# Patient Record
Sex: FEMALE | Race: WHITE
[De-identification: names, ages, dates, MRNs, and addresses within clinical notes are randomized per-mention and may not be internally consistent; named-entity substitution may affect disease eponyms.]

---

## 2018-11-02 ENCOUNTER — TRANSCRIPTION ENCOUNTER (OUTPATIENT)
Age: 28
End: 2018-11-02

## 2018-11-11 ENCOUNTER — TRANSCRIPTION ENCOUNTER (OUTPATIENT)
Age: 28
End: 2018-11-11

## 2019-02-14 ENCOUNTER — TRANSCRIPTION ENCOUNTER (OUTPATIENT)
Age: 29
End: 2019-02-14

## 2019-02-20 ENCOUNTER — APPOINTMENT (OUTPATIENT)
Dept: DERMATOLOGY | Facility: CLINIC | Age: 29
End: 2019-02-20
Payer: MEDICAID

## 2019-02-20 VITALS — BODY MASS INDEX: 26.31 KG/M2 | HEIGHT: 60 IN | WEIGHT: 134 LBS

## 2019-02-20 DIAGNOSIS — Z80.8 FAMILY HISTORY OF MALIGNANT NEOPLASM OF OTHER ORGANS OR SYSTEMS: ICD-10-CM

## 2019-02-20 DIAGNOSIS — B36.0 PITYRIASIS VERSICOLOR: ICD-10-CM

## 2019-02-20 DIAGNOSIS — Z00.00 ENCOUNTER FOR GENERAL ADULT MEDICAL EXAMINATION W/OUT ABNORMAL FINDINGS: ICD-10-CM

## 2019-02-20 DIAGNOSIS — D22.9 MELANOCYTIC NEVI, UNSPECIFIED: ICD-10-CM

## 2019-02-20 DIAGNOSIS — Z80.9 FAMILY HISTORY OF MALIGNANT NEOPLASM, UNSPECIFIED: ICD-10-CM

## 2019-02-20 PROCEDURE — 99203 OFFICE O/P NEW LOW 30 MIN: CPT

## 2019-02-20 RX ORDER — NORETHINDRONE ACETATE AND ETHINYL ESTRADIOL AND FERROUS FUMARATE 1MG-20(24)
KIT ORAL
Refills: 0 | Status: ACTIVE | COMMUNITY

## 2019-02-20 NOTE — HISTORY OF PRESENT ILLNESS
[FreeTextEntry1] : Rash - trunk. [de-identified] : Notes present for 6-7 months,with progression.  No response to solitary tx with diflucan.  mild itching.  No self tx.

## 2019-02-20 NOTE — ASSESSMENT
[FreeTextEntry1] : A complete skin examination was performed.  There is no evidence of skin cancer.  We discussed the importance of photoprotection, including the use of hats, protective clothing and sunscreens with an SPF of at least 30.  Sun avoidance was also discussed.\par \par TV\par Nizoral foam bid for 1 month.\par head and shoulders intensive tx shampoo - weekly total body washes.

## 2019-02-20 NOTE — PHYSICAL EXAM
[Alert] : alert [Oriented x 3] : ~L oriented x 3 [Well Nourished] : well nourished [Full Body Skin Exam Performed] : performed [Eyelids] : Eyelids [Ears] : Ears [Lips] : Lips [Neck] : Neck [FreeTextEntry3] : A full skin exam was performed including the scalp, face, neck, chest, abdomen, back, buttocks, upper extremities and lower extremities.  The patient declined examination of the breasts and genitalia.  \par The exam did not reveal any evidence of skin cancer, showing only the following benign growths:\par Indiahoma pigmented nevi - all small, 2-3 mm, and bland.  trunk.\par \par Erythematous scaling continental patches on the anterior/posterior trunk.\par

## 2019-03-25 ENCOUNTER — NON-APPOINTMENT (OUTPATIENT)
Age: 29
End: 2019-03-25

## 2019-03-25 ENCOUNTER — APPOINTMENT (OUTPATIENT)
Dept: CARDIOLOGY | Facility: CLINIC | Age: 29
End: 2019-03-25
Payer: MEDICAID

## 2019-03-25 VITALS
WEIGHT: 137 LBS | TEMPERATURE: 98.8 F | HEART RATE: 89 BPM | DIASTOLIC BLOOD PRESSURE: 78 MMHG | BODY MASS INDEX: 25.21 KG/M2 | HEIGHT: 62 IN | OXYGEN SATURATION: 95 % | SYSTOLIC BLOOD PRESSURE: 123 MMHG

## 2019-03-25 DIAGNOSIS — R94.01 ABNORMAL ELECTROENCEPHALOGRAM [EEG]: ICD-10-CM

## 2019-03-25 DIAGNOSIS — R00.0 TACHYCARDIA, UNSPECIFIED: ICD-10-CM

## 2019-03-25 DIAGNOSIS — R93.0 ABNORMAL FINDINGS ON DIAGNOSTIC IMAGING OF SKULL AND HEAD, NOT ELSEWHERE CLASSIFIED: ICD-10-CM

## 2019-03-25 DIAGNOSIS — R55 SYNCOPE AND COLLAPSE: ICD-10-CM

## 2019-03-25 PROCEDURE — 36415 COLL VENOUS BLD VENIPUNCTURE: CPT

## 2019-03-25 PROCEDURE — 99205 OFFICE O/P NEW HI 60 MIN: CPT

## 2019-03-25 PROCEDURE — 93000 ELECTROCARDIOGRAM COMPLETE: CPT

## 2019-03-25 NOTE — PHYSICAL EXAM
[General Appearance - Well Developed] : well developed [Normal Appearance] : normal appearance [Well Groomed] : well groomed [General Appearance - Well Nourished] : well nourished [No Deformities] : no deformities [General Appearance - In No Acute Distress] : no acute distress [Normal Conjunctiva] : the conjunctiva exhibited no abnormalities [Eyelids - No Xanthelasma] : the eyelids demonstrated no xanthelasmas [Normal Oral Mucosa] : normal oral mucosa [No Oral Pallor] : no oral pallor [No Oral Cyanosis] : no oral cyanosis [Normal Jugular Venous A Waves Present] : normal jugular venous A waves present [Normal Jugular Venous V Waves Present] : normal jugular venous V waves present [No Jugular Venous Chambers A Waves] : no jugular venous chambers A waves [Heart Rate And Rhythm] : heart rate and rhythm were normal [Heart Sounds] : normal S1 and S2 [Murmurs] : no murmurs present [Respiration, Rhythm And Depth] : normal respiratory rhythm and effort [Exaggerated Use Of Accessory Muscles For Inspiration] : no accessory muscle use [Auscultation Breath Sounds / Voice Sounds] : lungs were clear to auscultation bilaterally [Abdomen Soft] : soft [Abdomen Tenderness] : non-tender [Abdomen Mass (___ Cm)] : no abdominal mass palpated [Abnormal Walk] : normal gait [Gait - Sufficient For Exercise Testing] : the gait was sufficient for exercise testing [Nail Clubbing] : no clubbing of the fingernails [Cyanosis, Localized] : no localized cyanosis [Petechial Hemorrhages (___cm)] : no petechial hemorrhages [Skin Color & Pigmentation] : normal skin color and pigmentation [] : no rash [No Venous Stasis] : no venous stasis [Skin Lesions] : no skin lesions [No Skin Ulcers] : no skin ulcer [No Xanthoma] : no  xanthoma was observed [Oriented To Time, Place, And Person] : oriented to person, place, and time [Affect] : the affect was normal [Mood] : the mood was normal [FreeTextEntry1] : Somewhat anxious, but not overly

## 2019-03-25 NOTE — REVIEW OF SYSTEMS
[Negative] : Heme/Lymph [Fever] : no fever [Headache] : no headache [Recent Weight Gain (___ Lbs)] : recent [unfilled] ~Ulb weight gain [Chills] : no chills [Feeling Fatigued] : not feeling fatigued [Blurry Vision] : no blurred vision [Eyeglasses] : not currently wearing eyeglasses [Earache] : no earache [Mouth Sores] : no mouth sores [Dyspnea on exertion] : not dyspnea during exertion [Chest  Pressure] : no chest pressure [Lower Ext Edema] : no extremity edema [Palpitations] : no palpitations [Cough] : no cough [Skin: A Rash] : rash: [see HPI] : see HPI [Dizziness] : dizziness [Convulsions] : convulsions [Tingling (Paresthesia)] : tingling [Depression] : no depression [Anxiety] : anxiety [Suicidal] : not suicidal

## 2019-03-25 NOTE — HISTORY OF PRESENT ILLNESS
[FreeTextEntry1] : July, 2013. The patient had a positive tilt test. After about 20 minutes her heart rate went up to 150 and her blood pressure dropped. Her blood pressure then sol. Dr. Alvarez spoke to the patient on the phone on Friday. I advised her to eat more salt and drink more liquids. This morning the patient got out of bed and walk to the kitchen. On the way to the kitchen she blacked out. She still gets a heart rate of 150 with minimal activity.\par \par March 25, 2019. First return visit since above. I believe Dr. Alvarez tried pindolol at one point but unclear if she took it or if it helped. She actually has been doing fairly well with vasovagal episodes mostly just during venipuncture and her panic attacks and anxiety have been under good control. She also claims she has sleep apnea, with 13-17 episodes of apnea per night. She was told it was because she had multiple nasal fractures, most from horseback riding, but she sleeps with her bed elevated and stays on her side and does not seem to have a problem. She has a neurological issues as mentioned and is followed by Dr. Dwight Rosenstein and had seen Dr. Merrill Bowen of neurosurgery (see enclosed notes in [employer]). For some reason over the last, month she's had about 4 episodes of what she calls fainting spells. Sounds like similar to her tilt table response, and they've come on pretty much at random. Once at [employer], once while walking to a restaurant, et cetera. She has enough warning to sit down in the car or on the side of a curb and think she passes out just briefly. She is exhausted afterwards and can feel fatigued for up to 2 hours. (I did do venipuncture myself, monitored her and she seemed to be having some distress, but had strong normal pulse throughout, not tachycardic, etc.) After exam and long discussion we decided to try low-dose beta blocker.\par (Mother also very concerned because the patient's grandmother was Jessica Sotelo, who had multiple severe problems, including antiphospholipid syndrome, treated by Dr. Alvarez. The patient's sister has cystic fibrosis and there are a lot of cardiac issues throughout the family. I reassured them that the patient was too young for coronary artery disease, and that she has already had 2 or 3 normal echos with Dr. Alvarez, most recent being 2013.)

## 2019-03-25 NOTE — DISCUSSION/SUMMARY
[FreeTextEntry1] : Depression: Mild autonomic dysfunction leading to orthostatic hypotension. I will discussion with the patient and her mother regarding this syndrome.\par \par Plan: Pindolol 5 mg b.i.d. Return to office in one week.

## 2019-03-25 NOTE — REASON FOR VISIT
Follow up with Dr Wilder at [facility] in the am tomorrow.    [FreeTextEntry1] : The patient's a 29-year-old woman who presented 20years ago with a history of tachycardia and feeling lightheaded. Has positive tilt table, vasovagal episodes, panic attacks, anxiety, sleep apnea, abnormal EEG, pineal gland tumor

## 2019-03-26 ENCOUNTER — MEDICATION RENEWAL (OUTPATIENT)
Age: 29
End: 2019-03-26

## 2019-03-26 LAB
ALBUMIN SERPL ELPH-MCNC: 4.4 G/DL
ALP BLD-CCNC: 42 U/L
ALT SERPL-CCNC: 13 U/L
ANION GAP SERPL CALC-SCNC: 14 MMOL/L
AST SERPL-CCNC: 19 U/L
BASOPHILS # BLD AUTO: 0.06 K/UL
BASOPHILS NFR BLD AUTO: 0.7 %
BILIRUB SERPL-MCNC: 0.4 MG/DL
BUN SERPL-MCNC: 12 MG/DL
CALCIUM SERPL-MCNC: 9.7 MG/DL
CHLORIDE SERPL-SCNC: 104 MMOL/L
CHOLEST SERPL-MCNC: 119 MG/DL
CHOLEST/HDLC SERPL: 1.4 RATIO
CO2 SERPL-SCNC: 21 MMOL/L
CREAT SERPL-MCNC: 0.65 MG/DL
EOSINOPHIL # BLD AUTO: 0.07 K/UL
EOSINOPHIL NFR BLD AUTO: 0.8 %
GLUCOSE SERPL-MCNC: 82 MG/DL
HCT VFR BLD CALC: 39.6 %
HDLC SERPL-MCNC: 84 MG/DL
HGB BLD-MCNC: 12.5 G/DL
IMM GRANULOCYTES NFR BLD AUTO: 0.3 %
LDLC SERPL CALC-MCNC: 29 MG/DL
LYMPHOCYTES # BLD AUTO: 2.2 K/UL
LYMPHOCYTES NFR BLD AUTO: 24.7 %
MAN DIFF?: NORMAL
MCHC RBC-ENTMCNC: 30 PG
MCHC RBC-ENTMCNC: 31.6 GM/DL
MCV RBC AUTO: 95.2 FL
MONOCYTES # BLD AUTO: 0.42 K/UL
MONOCYTES NFR BLD AUTO: 4.7 %
NEUTROPHILS # BLD AUTO: 6.12 K/UL
NEUTROPHILS NFR BLD AUTO: 68.8 %
PLATELET # BLD AUTO: 354 K/UL
POTASSIUM SERPL-SCNC: 4.2 MMOL/L
PROT SERPL-MCNC: 6.9 G/DL
RBC # BLD: 4.16 M/UL
RBC # FLD: 12.5 %
SODIUM SERPL-SCNC: 139 MMOL/L
TRIGL SERPL-MCNC: 31 MG/DL
TSH SERPL-ACNC: 2.07 UIU/ML
WBC # FLD AUTO: 8.9 K/UL

## 2019-05-05 ENCOUNTER — TRANSCRIPTION ENCOUNTER (OUTPATIENT)
Age: 29
End: 2019-05-05

## 2019-05-08 ENCOUNTER — APPOINTMENT (OUTPATIENT)
Dept: CARDIOLOGY | Facility: CLINIC | Age: 29
End: 2019-05-08

## 2019-10-03 ENCOUNTER — TRANSCRIPTION ENCOUNTER (OUTPATIENT)
Age: 29
End: 2019-10-03

## 2019-10-09 ENCOUNTER — TRANSCRIPTION ENCOUNTER (OUTPATIENT)
Age: 29
End: 2019-10-09

## 2019-10-10 ENCOUNTER — APPOINTMENT (OUTPATIENT)
Dept: CARDIOLOGY | Facility: CLINIC | Age: 29
End: 2019-10-10
Payer: MEDICAID

## 2019-10-10 ENCOUNTER — NON-APPOINTMENT (OUTPATIENT)
Age: 29
End: 2019-10-10

## 2019-10-10 VITALS
HEIGHT: 62 IN | SYSTOLIC BLOOD PRESSURE: 108 MMHG | BODY MASS INDEX: 24.29 KG/M2 | OXYGEN SATURATION: 100 % | WEIGHT: 132 LBS | TEMPERATURE: 97.8 F | HEART RATE: 86 BPM | DIASTOLIC BLOOD PRESSURE: 75 MMHG

## 2019-10-10 DIAGNOSIS — R42 DIZZINESS AND GIDDINESS: ICD-10-CM

## 2019-10-10 PROCEDURE — 99215 OFFICE O/P EST HI 40 MIN: CPT

## 2019-10-10 PROCEDURE — 93000 ELECTROCARDIOGRAM COMPLETE: CPT

## 2019-10-10 PROCEDURE — 36415 COLL VENOUS BLD VENIPUNCTURE: CPT

## 2019-10-10 NOTE — REASON FOR VISIT
[FreeTextEntry1] : The patient's a 29-year-old woman who presented 20 years ago with a history of tachycardia and feeling lightheaded. Has positive tilt table, vasovagal episodes, panic attacks, anxiety, sleep apnea, abnormal EEG, pineal gland tumor

## 2019-10-10 NOTE — PHYSICAL EXAM
[General Appearance - Well Developed] : well developed [Well Groomed] : well groomed [Normal Appearance] : normal appearance [General Appearance - Well Nourished] : well nourished [General Appearance - In No Acute Distress] : no acute distress [No Deformities] : no deformities [Normal Conjunctiva] : the conjunctiva exhibited no abnormalities [Normal Oral Mucosa] : normal oral mucosa [Eyelids - No Xanthelasma] : the eyelids demonstrated no xanthelasmas [No Oral Cyanosis] : no oral cyanosis [No Oral Pallor] : no oral pallor [Normal Jugular Venous V Waves Present] : normal jugular venous V waves present [Normal Jugular Venous A Waves Present] : normal jugular venous A waves present [Respiration, Rhythm And Depth] : normal respiratory rhythm and effort [No Jugular Venous Chambers A Waves] : no jugular venous chambers A waves [Auscultation Breath Sounds / Voice Sounds] : lungs were clear to auscultation bilaterally [Exaggerated Use Of Accessory Muscles For Inspiration] : no accessory muscle use [Heart Rate And Rhythm] : heart rate and rhythm were normal [Heart Sounds] : normal S1 and S2 [Murmurs] : no murmurs present [Abdomen Soft] : soft [Abdomen Mass (___ Cm)] : no abdominal mass palpated [Abdomen Tenderness] : non-tender [Nail Clubbing] : no clubbing of the fingernails [Abnormal Walk] : normal gait [Gait - Sufficient For Exercise Testing] : the gait was sufficient for exercise testing [Cyanosis, Localized] : no localized cyanosis [Petechial Hemorrhages (___cm)] : no petechial hemorrhages [Skin Color & Pigmentation] : normal skin color and pigmentation [] : no rash [Skin Lesions] : no skin lesions [No Venous Stasis] : no venous stasis [No Skin Ulcers] : no skin ulcer [No Xanthoma] : no  xanthoma was observed [Oriented To Time, Place, And Person] : oriented to person, place, and time [Affect] : the affect was normal [Mood] : the mood was normal [FreeTextEntry1] : Somewhat anxious, but not overly

## 2019-10-10 NOTE — REVIEW OF SYSTEMS
[Shortness Of Breath] : shortness of breath [Palpitations] : palpitations [see HPI] : see HPI [Dizziness] : dizziness [Tingling (Paresthesia)] : tingling [Anxiety] : anxiety [Negative] : Heme/Lymph [Headache] : no headache [Fever] : no fever [Chills] : no chills [Recent Weight Gain (___ Lbs)] : no recent weight gain [Blurry Vision] : no blurred vision [Feeling Fatigued] : not feeling fatigued [Eyeglasses] : not currently wearing eyeglasses [Earache] : no earache [Mouth Sores] : no mouth sores [Dyspnea on exertion] : not dyspnea during exertion [Chest  Pressure] : no chest pressure [Skin: A Rash] : no rash: [Cough] : no cough [Lower Ext Edema] : no extremity edema [Depression] : no depression [Convulsions] : no convulsions [Suicidal] : not suicidal

## 2019-10-10 NOTE — HISTORY OF PRESENT ILLNESS
[FreeTextEntry1] : July, 2013. The patient had a positive tilt test. After about 20 minutes her heart rate went up to 150 and her blood pressure dropped. Her blood pressure then sol. Dr. Alvarez spoke to the patient on the phone on Friday. He advised her to eat more salt and drink more liquids. This morning the patient got out of bed and walk to the kitchen. On the way to the kitchen she blacked out. She still gets a heart rate of 150 with minimal activity.\par \par March 25, 2019. First return visit since above. I believe Dr. Alvarez tried pindolol at one point but unclear if she took it or if it helped. She actually has been doing fairly well with vasovagal episodes mostly just during venipuncture and her panic attacks and anxiety have been under good control. She also claims she has sleep apnea, with 13-17 episodes of apnea per night. She was told it was because she had multiple nasal fractures, most from horseback riding, but she sleeps with her bed elevated and stays on her side and does not seem to have a problem. She has a neurological issues as mentioned and is followed by Dr. Dwight Rosenstein and had seen Dr. Merrill Bowen of neurosurgery (see enclosed notes in [employer]). For some reason over the last, month she's had about 4 episodes of what she calls fainting spells. Sounds like similar to her tilt table response, and they've come on pretty much at random. Once at [employer], once while walking to a restaurant, et cetera. She has enough warning to sit down in the car or on the side of a curb and think she passes out just briefly. She is exhausted afterwards and can feel fatigued for up to 2 hours. (I did do venipuncture myself, monitored her and she seemed to be having some distress, but had strong normal pulse throughout, not tachycardic, etc.) After exam and long discussion we decided to try low-dose beta blocker.\par (Mother also very concerned because the patient's grandmother was Jessica Sotelo, who had multiple severe problems, including antiphospholipid syndrome, treated by Dr. Alvarez. The patient's sister has cystic fibrosis and there are a lot of cardiac issues throughout the family. I reassured them that the patient was too young for coronary artery disease, and that she has already had 2 or 3 normal echos with Dr. Alvarez, most recent being 2013.)\par October 10, 2019.  Patient returns in follow-up complaining of palpitations.  EKG here with sinus rhythm and unchanged and rhythm strip chest shows sinus arrhythmia.  Unclear if the bisoprolol helped her but after 1 month she stopped it because her blood pressure was in the 80s and she felt a little lightheaded and dizzy.  She said she had a same problem with pindolol in the past.  She now has what she calls a new symptom of feeling her heart racing and feeling an absence of air that can be on and off or last all day and at times is continuous.  May be one episode while working out where her heart rate was 160 according to her watch.  She claims she had no symptoms during the rhythm strip here.  She is conscious about maintaining hydration and salt load etc. Fitbit watch did capture 2 episodes one during sleep which is recorded a heart rate of 150 and once during the day of 170.  Discussed multiple options including different medication, EP study, loop recorder, and decided on a 30-day MCOT monitor first.

## 2019-10-11 LAB
ALBUMIN SERPL ELPH-MCNC: 4.8 G/DL
ALP BLD-CCNC: 51 U/L
ALT SERPL-CCNC: 25 U/L
ANION GAP SERPL CALC-SCNC: 16 MMOL/L
AST SERPL-CCNC: 20 U/L
BASOPHILS # BLD AUTO: 0.04 K/UL
BASOPHILS NFR BLD AUTO: 0.7 %
BILIRUB SERPL-MCNC: 0.4 MG/DL
BUN SERPL-MCNC: 10 MG/DL
CALCIUM SERPL-MCNC: 10 MG/DL
CHLORIDE SERPL-SCNC: 104 MMOL/L
CHOLEST SERPL-MCNC: 129 MG/DL
CHOLEST/HDLC SERPL: 1.6 RATIO
CO2 SERPL-SCNC: 20 MMOL/L
CREAT SERPL-MCNC: 0.75 MG/DL
EOSINOPHIL # BLD AUTO: 0.1 K/UL
EOSINOPHIL NFR BLD AUTO: 1.8 %
GLUCOSE SERPL-MCNC: 84 MG/DL
HCT VFR BLD CALC: 42.5 %
HDLC SERPL-MCNC: 81 MG/DL
HGB BLD-MCNC: 13.7 G/DL
IMM GRANULOCYTES NFR BLD AUTO: 0.2 %
LDLC SERPL CALC-MCNC: 40 MG/DL
LYMPHOCYTES # BLD AUTO: 1.72 K/UL
LYMPHOCYTES NFR BLD AUTO: 31.8 %
MAN DIFF?: NORMAL
MCHC RBC-ENTMCNC: 29.7 PG
MCHC RBC-ENTMCNC: 32.2 GM/DL
MCV RBC AUTO: 92 FL
MONOCYTES # BLD AUTO: 0.42 K/UL
MONOCYTES NFR BLD AUTO: 7.8 %
NEUTROPHILS # BLD AUTO: 3.12 K/UL
NEUTROPHILS NFR BLD AUTO: 57.7 %
NT-PROBNP SERPL-MCNC: 55 PG/ML
PLATELET # BLD AUTO: 290 K/UL
POTASSIUM SERPL-SCNC: 4.1 MMOL/L
PROT SERPL-MCNC: 7.4 G/DL
RBC # BLD: 4.62 M/UL
RBC # FLD: 11.9 %
SODIUM SERPL-SCNC: 140 MMOL/L
TRIGL SERPL-MCNC: 42 MG/DL
TSH SERPL-ACNC: 2.5 UIU/ML
WBC # FLD AUTO: 5.41 K/UL

## 2019-11-06 ENCOUNTER — APPOINTMENT (OUTPATIENT)
Dept: MRI IMAGING | Facility: CLINIC | Age: 29
End: 2019-11-06

## 2019-11-08 ENCOUNTER — OUTPATIENT (OUTPATIENT)
Dept: OUTPATIENT SERVICES | Facility: HOSPITAL | Age: 29
LOS: 1 days | End: 2019-11-08
Payer: MEDICAID

## 2019-11-08 ENCOUNTER — APPOINTMENT (OUTPATIENT)
Dept: MRI IMAGING | Facility: HOSPITAL | Age: 29
End: 2019-11-08
Payer: MEDICAID

## 2019-11-08 DIAGNOSIS — Z00.8 ENCOUNTER FOR OTHER GENERAL EXAMINATION: ICD-10-CM

## 2019-11-08 PROCEDURE — 72141 MRI NECK SPINE W/O DYE: CPT | Mod: 26

## 2019-11-08 PROCEDURE — 72141 MRI NECK SPINE W/O DYE: CPT

## 2019-11-29 ENCOUNTER — CLINICAL ADVICE (OUTPATIENT)
Age: 29
End: 2019-11-29

## 2019-12-06 ENCOUNTER — APPOINTMENT (OUTPATIENT)
Dept: MRI IMAGING | Facility: CLINIC | Age: 29
End: 2019-12-06
Payer: MEDICAID

## 2019-12-06 ENCOUNTER — OUTPATIENT (OUTPATIENT)
Dept: OUTPATIENT SERVICES | Facility: HOSPITAL | Age: 29
LOS: 1 days | End: 2019-12-06
Payer: MEDICAID

## 2019-12-06 DIAGNOSIS — Z00.8 ENCOUNTER FOR OTHER GENERAL EXAMINATION: ICD-10-CM

## 2019-12-06 PROCEDURE — 70551 MRI BRAIN STEM W/O DYE: CPT

## 2019-12-06 PROCEDURE — 70551 MRI BRAIN STEM W/O DYE: CPT | Mod: 26

## 2019-12-10 ENCOUNTER — APPOINTMENT (OUTPATIENT)
Dept: ELECTROPHYSIOLOGY | Facility: CLINIC | Age: 29
End: 2019-12-10

## 2019-12-10 ENCOUNTER — APPOINTMENT (OUTPATIENT)
Dept: ELECTROPHYSIOLOGY | Facility: CLINIC | Age: 29
End: 2019-12-10
Payer: MEDICAID

## 2019-12-10 ENCOUNTER — TRANSCRIPTION ENCOUNTER (OUTPATIENT)
Age: 29
End: 2019-12-10

## 2019-12-10 VITALS
OXYGEN SATURATION: 100 % | DIASTOLIC BLOOD PRESSURE: 73 MMHG | HEIGHT: 62 IN | BODY MASS INDEX: 22.63 KG/M2 | HEART RATE: 87 BPM | WEIGHT: 123 LBS | SYSTOLIC BLOOD PRESSURE: 110 MMHG

## 2019-12-10 PROCEDURE — 93000 ELECTROCARDIOGRAM COMPLETE: CPT

## 2019-12-10 PROCEDURE — 99204 OFFICE O/P NEW MOD 45 MIN: CPT

## 2019-12-10 RX ORDER — KETOCONAZOLE FOAM 20 MG/G
2 AEROSOL, FOAM TOPICAL TWICE DAILY
Qty: 1 | Refills: 4 | Status: DISCONTINUED | COMMUNITY
Start: 2019-02-20 | End: 2019-12-10

## 2019-12-10 RX ORDER — BISOPROLOL FUMARATE 5 MG/1
5 TABLET, FILM COATED ORAL DAILY
Qty: 30 | Refills: 0 | Status: DISCONTINUED | COMMUNITY
Start: 2019-03-25 | End: 2019-12-10

## 2019-12-10 RX ORDER — IVABRADINE 5 MG/1
5 TABLET, FILM COATED ORAL
Qty: 60 | Refills: 0 | Status: DISCONTINUED | COMMUNITY
Start: 2019-12-10 | End: 2019-12-10

## 2019-12-10 NOTE — REVIEW OF SYSTEMS
[Shortness Of Breath] : shortness of breath [Dyspnea on exertion] : dyspnea during exertion [Dizziness] : dizziness [Palpitations] : palpitations [Negative] : Endocrine

## 2019-12-10 NOTE — PHYSICAL EXAM
[General Appearance - Well Developed] : well developed [Normal Appearance] : normal appearance [General Appearance - Well Nourished] : well nourished [No Deformities] : no deformities [Well Groomed] : well groomed [General Appearance - In No Acute Distress] : no acute distress [Normal Conjunctiva] : the conjunctiva exhibited no abnormalities [Normal Oral Mucosa] : normal oral mucosa [Eyelids - No Xanthelasma] : the eyelids demonstrated no xanthelasmas [No Oral Pallor] : no oral pallor [No Oral Cyanosis] : no oral cyanosis [No Jugular Venous Chambers A Waves] : no jugular venous chambers A waves [Normal Jugular Venous A Waves Present] : normal jugular venous A waves present [Normal Jugular Venous V Waves Present] : normal jugular venous V waves present [Heart Rate And Rhythm] : heart rate and rhythm were normal [Heart Sounds] : normal S1 and S2 [Murmurs] : no murmurs present [Respiration, Rhythm And Depth] : normal respiratory rhythm and effort [Auscultation Breath Sounds / Voice Sounds] : lungs were clear to auscultation bilaterally [Exaggerated Use Of Accessory Muscles For Inspiration] : no accessory muscle use [Abdomen Tenderness] : non-tender [Abdomen Soft] : soft [Abdomen Mass (___ Cm)] : no abdominal mass palpated [Abnormal Walk] : normal gait [Nail Clubbing] : no clubbing of the fingernails [Gait - Sufficient For Exercise Testing] : the gait was sufficient for exercise testing [Cyanosis, Localized] : no localized cyanosis [Petechial Hemorrhages (___cm)] : no petechial hemorrhages [Skin Color & Pigmentation] : normal skin color and pigmentation [] : no ischemic changes [No Venous Stasis] : no venous stasis [Skin Lesions] : no skin lesions [No Xanthoma] : no  xanthoma was observed [No Skin Ulcers] : no skin ulcer [Oriented To Time, Place, And Person] : oriented to person, place, and time [Affect] : the affect was normal [No Anxiety] : not feeling anxious [Mood] : the mood was normal

## 2019-12-13 ENCOUNTER — NON-APPOINTMENT (OUTPATIENT)
Age: 29
End: 2019-12-13

## 2019-12-13 NOTE — HISTORY OF PRESENT ILLNESS
[FreeTextEntry1] : Viktor Colvin MD\par \par Claudia Lenz is a 28y/o woman with long standing Hx of palpitations, anxiety/panic attacks (not on medication), mild NELA (no recent sleep study and not on CPAP), syncope and near syncope, and POTS (diagnosed in 2012) who presents today for initial evaluation. Admits recurring episodes of tachycardia up to the 200s with minimal exertion, up to the 240s as per patient with exercise. Unable to tolerate activity due to feeling of near syncope or actual fainting. States her heart will race with standing and gradually return to baseline. Has utilized beta blockers in the past but was unable to tolerate secondary to hypotension. Never attempted midodrine/beta blocker combination. Also experiences dyspnea when her heart races as well as episodes of feeling her heart skip a beat. Underwent Holter monitoring which revealed episodes of sinus tachycardia to the 170s (was not exercising or exerting herself at that time) as well as episodes of isolated PVCs. Has family history of POTS (grandmother) as well as arrhythmias including PVCs (mother) and atrial fibrillation (grandmother). Has done research on POTS and follows the [place] protocol/guidelines and attempts salt loading and increased hydration with minimal relief. No recent ECHO or stress test.

## 2019-12-13 NOTE — DISCUSSION/SUMMARY
[FreeTextEntry1] : In summary, Claudia Lenz is a 28y/o woman with long standing Hx of palpitations, anxiety/panic attacks (not on medication), mild NELA (no recent sleep study and not on CPAP), syncope and near syncope, and POTS (diagnosed in 2012) who presents today for initial evaluation. Admits recurring episodes of tachycardia up to the 200s with minimal exertion, up to the 240s as per patient with exercise. Unable to tolerate activity due to feeling of near syncope or actual fainting. States her heart will race with standing and gradually return to baseline. Has utilized beta blockers in the past but was unable to tolerate secondary to hypotension. Never attempted midodrine/beta blocker combination. Also experiences dyspnea when her heart races as well as episodes of feeling her heart skip a beat. Underwent Holter monitoring which revealed episodes of sinus tachycardia to the 170s (was not exercising or exerting herself at that time) as well as episodes of isolated PVCs. Has family history of POTS (grandmother) as well as arrhythmias including PVCs (mother) and atrial fibrillation (grandmother). Has done research on POTS and follows the [place] protocol/guidelines and attempts salt loading and increased hydration with minimal relief. No recent ECHO or stress test. EKG today NSR without evidence of PVCs. Given no recent stress test or ECHO, recommend undergoing both. Continue utilizing salt and staying well hydrated. Review of prior tilt table test revealed evidence of vasovagal syncope as well. Instructed on safety mechanisms such as staying well hydrated, utilizing salt, avoiding prolonged standing with knees locked, and to lie down with feet elevated if symptoms of near syncope occur. Can also consider use of compression socks and should avoid known triggers for syncope such as alcohol and warm or crowded environments.\par \par Sincerely,\par \par Roberto West MD

## 2020-01-02 ENCOUNTER — OUTPATIENT (OUTPATIENT)
Dept: OUTPATIENT SERVICES | Facility: HOSPITAL | Age: 30
LOS: 1 days | End: 2020-01-02
Payer: MEDICAID

## 2020-01-02 DIAGNOSIS — R00.2 PALPITATIONS: ICD-10-CM

## 2020-01-02 PROCEDURE — 93227 XTRNL ECG REC<48 HR R&I: CPT

## 2020-01-03 ENCOUNTER — APPOINTMENT (OUTPATIENT)
Dept: CV DIAGNOSITCS | Facility: HOSPITAL | Age: 30
End: 2020-01-03

## 2020-01-03 ENCOUNTER — APPOINTMENT (OUTPATIENT)
Dept: CV DIAGNOSTICS | Facility: HOSPITAL | Age: 30
End: 2020-01-03

## 2020-01-03 ENCOUNTER — OUTPATIENT (OUTPATIENT)
Dept: OUTPATIENT SERVICES | Facility: HOSPITAL | Age: 30
LOS: 1 days | End: 2020-01-03

## 2020-01-03 DIAGNOSIS — R55 SYNCOPE AND COLLAPSE: ICD-10-CM

## 2020-03-17 ENCOUNTER — INPATIENT (INPATIENT)
Facility: HOSPITAL | Age: 30
LOS: 2 days | Discharge: HOME | DRG: 720 | End: 2020-03-20
Attending: FAMILY MEDICINE | Admitting: HOSPITALIST
Payer: MEDICAID

## 2020-03-17 VITALS — HEIGHT: 60 IN | WEIGHT: 125 LBS

## 2020-03-17 DIAGNOSIS — J12.9 VIRAL PNEUMONIA, UNSPECIFIED: ICD-10-CM

## 2020-03-17 LAB
ALBUMIN SERPL ELPH-MCNC: 3.4 G/DL — SIGNIFICANT CHANGE UP (ref 3.3–5)
ALP SERPL-CCNC: 46 U/L — SIGNIFICANT CHANGE UP (ref 40–120)
ALT FLD-CCNC: 15 U/L — SIGNIFICANT CHANGE UP (ref 12–78)
ANION GAP SERPL CALC-SCNC: 4 MMOL/L — LOW (ref 5–17)
AST SERPL-CCNC: 16 U/L — SIGNIFICANT CHANGE UP (ref 15–37)
BASOPHILS # BLD AUTO: 0.02 K/UL — SIGNIFICANT CHANGE UP (ref 0–0.2)
BASOPHILS NFR BLD AUTO: 0.3 % — SIGNIFICANT CHANGE UP (ref 0–2)
BILIRUB SERPL-MCNC: 0.2 MG/DL — SIGNIFICANT CHANGE UP (ref 0.2–1.2)
BUN SERPL-MCNC: 6 MG/DL — LOW (ref 7–23)
CALCIUM SERPL-MCNC: 9 MG/DL — SIGNIFICANT CHANGE UP (ref 8.5–10.1)
CHLORIDE SERPL-SCNC: 109 MMOL/L — HIGH (ref 96–108)
CO2 SERPL-SCNC: 25 MMOL/L — SIGNIFICANT CHANGE UP (ref 22–31)
CREAT SERPL-MCNC: 0.7 MG/DL — SIGNIFICANT CHANGE UP (ref 0.5–1.3)
EOSINOPHIL # BLD AUTO: 0.05 K/UL — SIGNIFICANT CHANGE UP (ref 0–0.5)
EOSINOPHIL NFR BLD AUTO: 0.8 % — SIGNIFICANT CHANGE UP (ref 0–6)
GLUCOSE SERPL-MCNC: 100 MG/DL — HIGH (ref 70–99)
HCT VFR BLD CALC: 39.7 % — SIGNIFICANT CHANGE UP (ref 34.5–45)
HGB BLD-MCNC: 13.4 G/DL — SIGNIFICANT CHANGE UP (ref 11.5–15.5)
IMM GRANULOCYTES NFR BLD AUTO: 0.2 % — SIGNIFICANT CHANGE UP (ref 0–1.5)
LYMPHOCYTES # BLD AUTO: 1.45 K/UL — SIGNIFICANT CHANGE UP (ref 1–3.3)
LYMPHOCYTES # BLD AUTO: 21.8 % — SIGNIFICANT CHANGE UP (ref 13–44)
MCHC RBC-ENTMCNC: 30.2 PG — SIGNIFICANT CHANGE UP (ref 27–34)
MCHC RBC-ENTMCNC: 33.8 GM/DL — SIGNIFICANT CHANGE UP (ref 32–36)
MCV RBC AUTO: 89.4 FL — SIGNIFICANT CHANGE UP (ref 80–100)
MONOCYTES # BLD AUTO: 0.32 K/UL — SIGNIFICANT CHANGE UP (ref 0–0.9)
MONOCYTES NFR BLD AUTO: 4.8 % — SIGNIFICANT CHANGE UP (ref 2–14)
NEUTROPHILS # BLD AUTO: 4.81 K/UL — SIGNIFICANT CHANGE UP (ref 1.8–7.4)
NEUTROPHILS NFR BLD AUTO: 72.1 % — SIGNIFICANT CHANGE UP (ref 43–77)
PLATELET # BLD AUTO: 248 K/UL — SIGNIFICANT CHANGE UP (ref 150–400)
POTASSIUM SERPL-MCNC: 4.2 MMOL/L — SIGNIFICANT CHANGE UP (ref 3.5–5.3)
POTASSIUM SERPL-SCNC: 4.2 MMOL/L — SIGNIFICANT CHANGE UP (ref 3.5–5.3)
PROT SERPL-MCNC: 7.4 GM/DL — SIGNIFICANT CHANGE UP (ref 6–8.3)
RBC # BLD: 4.44 M/UL — SIGNIFICANT CHANGE UP (ref 3.8–5.2)
RBC # FLD: 12.1 % — SIGNIFICANT CHANGE UP (ref 10.3–14.5)
SODIUM SERPL-SCNC: 138 MMOL/L — SIGNIFICANT CHANGE UP (ref 135–145)
WBC # BLD: 6.66 K/UL — SIGNIFICANT CHANGE UP (ref 3.8–10.5)
WBC # FLD AUTO: 6.66 K/UL — SIGNIFICANT CHANGE UP (ref 3.8–10.5)

## 2020-03-17 PROCEDURE — 85730 THROMBOPLASTIN TIME PARTIAL: CPT

## 2020-03-17 PROCEDURE — 80053 COMPREHEN METABOLIC PANEL: CPT

## 2020-03-17 PROCEDURE — 85025 COMPLETE CBC W/AUTO DIFF WBC: CPT

## 2020-03-17 PROCEDURE — 71045 X-RAY EXAM CHEST 1 VIEW: CPT | Mod: 26

## 2020-03-17 PROCEDURE — 83735 ASSAY OF MAGNESIUM: CPT

## 2020-03-17 PROCEDURE — 80048 BASIC METABOLIC PNL TOTAL CA: CPT

## 2020-03-17 PROCEDURE — 87040 BLOOD CULTURE FOR BACTERIA: CPT

## 2020-03-17 PROCEDURE — 85610 PROTHROMBIN TIME: CPT

## 2020-03-17 PROCEDURE — 86140 C-REACTIVE PROTEIN: CPT

## 2020-03-17 PROCEDURE — 71045 X-RAY EXAM CHEST 1 VIEW: CPT

## 2020-03-17 PROCEDURE — 99223 1ST HOSP IP/OBS HIGH 75: CPT

## 2020-03-17 PROCEDURE — 84100 ASSAY OF PHOSPHORUS: CPT

## 2020-03-17 PROCEDURE — 36415 COLL VENOUS BLD VENIPUNCTURE: CPT

## 2020-03-17 RX ORDER — CEFTRIAXONE 500 MG/1
1000 INJECTION, POWDER, FOR SOLUTION INTRAMUSCULAR; INTRAVENOUS EVERY 24 HOURS
Refills: 0 | Status: DISCONTINUED | OUTPATIENT
Start: 2020-03-17 | End: 2020-03-20

## 2020-03-17 RX ORDER — SODIUM CHLORIDE 9 MG/ML
1000 INJECTION INTRAMUSCULAR; INTRAVENOUS; SUBCUTANEOUS
Refills: 0 | Status: DISCONTINUED | OUTPATIENT
Start: 2020-03-17 | End: 2020-03-20

## 2020-03-17 RX ORDER — AZITHROMYCIN 500 MG/1
500 TABLET, FILM COATED ORAL DAILY
Refills: 0 | Status: DISCONTINUED | OUTPATIENT
Start: 2020-03-17 | End: 2020-03-20

## 2020-03-17 RX ORDER — VANCOMYCIN HCL 1 G
750 VIAL (EA) INTRAVENOUS EVERY 8 HOURS
Refills: 0 | Status: DISCONTINUED | OUTPATIENT
Start: 2020-03-17 | End: 2020-03-17

## 2020-03-17 RX ORDER — CEFTRIAXONE 500 MG/1
1000 INJECTION, POWDER, FOR SOLUTION INTRAMUSCULAR; INTRAVENOUS ONCE
Refills: 0 | Status: COMPLETED | OUTPATIENT
Start: 2020-03-17 | End: 2020-03-17

## 2020-03-17 RX ORDER — CEFTRIAXONE 500 MG/1
1000 INJECTION, POWDER, FOR SOLUTION INTRAMUSCULAR; INTRAVENOUS ONCE
Refills: 0 | Status: DISCONTINUED | OUTPATIENT
Start: 2020-03-17 | End: 2020-03-17

## 2020-03-17 RX ORDER — AZITHROMYCIN 500 MG/1
500 TABLET, FILM COATED ORAL ONCE
Refills: 0 | Status: COMPLETED | OUTPATIENT
Start: 2020-03-17 | End: 2020-03-17

## 2020-03-17 RX ADMIN — CEFTRIAXONE 1000 MILLIGRAM(S): 500 INJECTION, POWDER, FOR SOLUTION INTRAMUSCULAR; INTRAVENOUS at 20:43

## 2020-03-17 RX ADMIN — AZITHROMYCIN 500 MILLIGRAM(S): 500 TABLET, FILM COATED ORAL at 20:43

## 2020-03-17 RX ADMIN — SODIUM CHLORIDE 100 MILLILITER(S): 9 INJECTION INTRAMUSCULAR; INTRAVENOUS; SUBCUTANEOUS at 22:11

## 2020-03-17 RX ADMIN — CEFTRIAXONE 1000 MILLIGRAM(S): 500 INJECTION, POWDER, FOR SOLUTION INTRAMUSCULAR; INTRAVENOUS at 22:52

## 2020-03-17 NOTE — ED ADULT TRIAGE NOTE - CHIEF COMPLAINT QUOTE
Pt c/o increased sob x 3 days ,cough, fevers , pt is COVID 19 + Pt c/o increased sob x 3 days ,cough, fevers , pt is COVID 19 +, pt 's cell [phone]

## 2020-03-17 NOTE — ED PROVIDER NOTE - PMH
Anxiety    Endometriosis    GERD (gastroesophageal reflux disease)    IBS (irritable bowel syndrome)    POTS (postural orthostatic tachycardia syndrome)    Sleep apnea    Vasovagal syncope    Vertigo

## 2020-03-17 NOTE — ED ADULT NURSE NOTE - OBJECTIVE STATEMENT
pt sent to ED by [facility] for + COVID 19 w/ SOB. pt reports fever, cough, and chest pain on inspiration starting Saturday, Tmax 104. Pt was swabbed for COVID 19 at [facility] on Saturday, was called today w/ positive result. reports worsening CAMPA, states she "cannot go 5 steps without having to stop and take a breath." Cardiac monitoring in place, VS JOHNL, #20 IV placed in R. AC, bloods drawn and sent to lab, pt in no acute distress at this time, will continue to monitor.

## 2020-03-17 NOTE — H&P ADULT - ASSESSMENT
29 y/o F with PMH of IBS, anxiety, vasovagal syncope, vertigo, endometriosis, GERD, sleep apnea, p/w increasing SOB.    *Sepsis 2/2 COVID-19 w/ possible superimposed bacterial infection  -Supportive care for COVID-19  -Pulse ox monitoring continuously  -Supplemental O2  -CT chest for further evaluation  -Isolation  -Ceftriaxone / zithromax  -F/u Blood cultures  -IVF  -ID consult  -Will admit to step down for continuous pulse ox monitoring     *DVT ppx  -SCDs 29 y/o F with PMH of IBS, anxiety, vasovagal syncope, vertigo, endometriosis, GERD, sleep apnea, p/w increasing SOB.    *Sepsis 2/2 COVID-19 w/ possible superimposed bacterial infection  -Supportive care for COVID-19  -Pulse ox monitoring continuously given that patient is p/w SOB and has significant infiltrates on CXR  -Supplemental O2  -Isolation  -Patient has documented allergy to erythromycin, but reaction is vomiting, and patient also received zithromax in the ED, with no reaction. Will continue and monitor closely. C/w ceftriaxone  -F/u Blood cultures  -IVF  -ID consult  -As recommended by hospital, will avoid CT chest for now to limit exposure, and will defer to ID     *DVT ppx  -SCDs 31 y/o F with PMH of IBS, anxiety, vasovagal syncope, vertigo, endometriosis, GERD, sleep apnea, p/w increasing SOB.    *Sepsis 2/2 COVID-19 w/ possible superimposed bacterial infection  -Supportive care for COVID-19  -Pulse ox monitoring continuously given that patient is p/w SOB and has significant infiltrates on CXR  -Supplemental O2  -Isolation  -Patient has documented allergy to erythromycin, but reaction is vomiting, and patient also received zithromax in the ED, with no reaction. Will continue and monitor closely. C/w ceftriaxone  -F/u Blood cultures  -IVF  -ID consult  -As recommended by hospital, will avoid CT chest for now to limit exposure, and will defer to ID     *DVT ppx  -SCDs 29 y/o F with PMH of IBS, anxiety, vasovagal syncope, vertigo, endometriosis, GERD, sleep apnea, p/w increasing SOB.    *Sepsis 2/2 COVID-19 w/ possible superimposed bacterial infection  -Supportive care for COVID-19  -Pulse ox monitoring continuously given that patient is p/w SOB and has significant infiltrates on CXR. Will monitor closely, low threshold for escalating level of care if needed  -Supplemental O2  -Isolation  -Patient has documented allergy to erythromycin, but reaction is vomiting, and patient also received zithromax in the ED, with no reaction. Will continue and monitor closely. C/w ceftriaxone  -F/u Blood cultures  -IVF  -ID consult  -As recommended by hospital, will avoid CT chest for now to limit exposure, and will defer to ID     *DVT ppx  -SCDs 31 y/o F with PMH of IBS, anxiety, vasovagal syncope, vertigo, endometriosis, GERD, sleep apnea, p/w increasing SOB.    *Sepsis 2/2 COVID-19 w/ possible superimposed bacterial infection  -Supportive care for COVID-19  -Pulse ox monitoring continuously given that patient is p/w SOB and has significant infiltrates on CXR. Will monitor closely, low threshold for escalating level of care if needed  -Supplemental O2  -Isolation  -Patient has documented allergy to erythromycin, but reaction is vomiting, and patient also received zithromax in the ED, with no reaction. Will continue and monitor closely. C/w ceftriaxone  -F/u Blood cultures  -IVF  -ID consult  -As recommended by hospital, will avoid CT chest for now to limit exposure, and will defer to ID   -At bedside, patient appears to be stable, speaking in full sentences, without any accessory muscle use or visible respiratory distress.     *DVT ppx  -SCDs 31 y/o F with PMH of IBS, anxiety, vasovagal syncope, vertigo, endometriosis, GERD, sleep apnea, p/w increasing SOB.    *Sepsis 2/2 COVID-19 w/ possible superimposed bacterial infection  -Supportive care for COVID-19  -Pulse ox monitoring continuously given that patient is p/w SOB and has significant infiltrates on CXR. Will monitor closely, low threshold for escalating level of care if needed  -Supplemental O2  -Isolation  -Patient has documented allergy to erythromycin, but reaction is vomiting, and patient also received zithromax in the ED, with no reaction. Will continue and monitor closely. C/w ceftriaxone  -F/u Blood cultures  -IVF  -ID consult  -As recommended by hospital, will avoid CT chest for now to limit exposure, and will defer to ID   -At bedside, patient appears to be stable, speaking in full sentences, without any accessory muscle use or respiratory distress.     *DVT ppx  -SCDs

## 2020-03-17 NOTE — ED PROVIDER NOTE - CLINICAL SUMMARY MEDICAL DECISION MAKING FREE TEXT BOX
Labs, CXR, and reassess. Labs, CXR, and reassess-->labs and VS ok.  CXR concerning.  will admit and give empiric abx

## 2020-03-17 NOTE — H&P ADULT - HISTORY OF PRESENT ILLNESS
31 y/o F with PMH of IBS, anxiety, vasovagal syncope, vertigo, endometriosis, GERD, sleep apnea, p/w increasing SOB. Patient states that she developed body aches about 4 days ago, and then developed a non-productive cough and runny nose. States she had a fever of 104 on Saturday and went to urgent care center and was tested for COVID19. She was informed later that she was positive for COVID19. States she came to ED today because she developed SOB, states she feels like she has to make effort to take a deep breath, but otherwise feels comfortable. Also c/o feeling very anxious about her diagnosis. Only other complaint is that her stomach feels uneasy, but denies abdominal pain. Denies nausea, vomiting, diarrhea, dysuria, increased frequency.     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

## 2020-03-17 NOTE — ED PROVIDER NOTE - OBJECTIVE STATEMENT
29 y/o female with PMHx of IBS, anxiety, vasovagal syncope, vertigo, endometriosis, GERD, POTS, and sleep apnea presents to the ED c/o increased +SOB x3 days. Endorses associated +cough and +fever. Pt has tested + for COVID-19 from urgent care on 3/14. Received f/u call from  and was advised to go to ED for her SOB. Never a smoker. Allergic to Cipro, codeine, Pediazole, and sulfa drugs.

## 2020-03-18 LAB
ALBUMIN SERPL ELPH-MCNC: 2.8 G/DL — LOW (ref 3.3–5)
ALP SERPL-CCNC: 43 U/L — SIGNIFICANT CHANGE UP (ref 40–120)
ALT FLD-CCNC: 15 U/L — SIGNIFICANT CHANGE UP (ref 12–78)
ANION GAP SERPL CALC-SCNC: 6 MMOL/L — SIGNIFICANT CHANGE UP (ref 5–17)
APTT BLD: 33.4 SEC — SIGNIFICANT CHANGE UP (ref 27.5–36.3)
AST SERPL-CCNC: 17 U/L — SIGNIFICANT CHANGE UP (ref 15–37)
BASOPHILS # BLD AUTO: 0.02 K/UL — SIGNIFICANT CHANGE UP (ref 0–0.2)
BASOPHILS NFR BLD AUTO: 0.4 % — SIGNIFICANT CHANGE UP (ref 0–2)
BILIRUB SERPL-MCNC: 0.2 MG/DL — SIGNIFICANT CHANGE UP (ref 0.2–1.2)
BUN SERPL-MCNC: 4 MG/DL — LOW (ref 7–23)
CALCIUM SERPL-MCNC: 8.5 MG/DL — SIGNIFICANT CHANGE UP (ref 8.5–10.1)
CHLORIDE SERPL-SCNC: 109 MMOL/L — HIGH (ref 96–108)
CO2 SERPL-SCNC: 26 MMOL/L — SIGNIFICANT CHANGE UP (ref 22–31)
CREAT SERPL-MCNC: 0.59 MG/DL — SIGNIFICANT CHANGE UP (ref 0.5–1.3)
EOSINOPHIL # BLD AUTO: 0.03 K/UL — SIGNIFICANT CHANGE UP (ref 0–0.5)
EOSINOPHIL NFR BLD AUTO: 0.5 % — SIGNIFICANT CHANGE UP (ref 0–6)
GLUCOSE SERPL-MCNC: 70 MG/DL — SIGNIFICANT CHANGE UP (ref 70–99)
HCT VFR BLD CALC: 37.2 % — SIGNIFICANT CHANGE UP (ref 34.5–45)
HGB BLD-MCNC: 12.3 G/DL — SIGNIFICANT CHANGE UP (ref 11.5–15.5)
IMM GRANULOCYTES NFR BLD AUTO: 0.2 % — SIGNIFICANT CHANGE UP (ref 0–1.5)
INR BLD: 1.02 RATIO — SIGNIFICANT CHANGE UP (ref 0.88–1.16)
LYMPHOCYTES # BLD AUTO: 1.48 K/UL — SIGNIFICANT CHANGE UP (ref 1–3.3)
LYMPHOCYTES # BLD AUTO: 26.2 % — SIGNIFICANT CHANGE UP (ref 13–44)
MAGNESIUM SERPL-MCNC: 2 MG/DL — SIGNIFICANT CHANGE UP (ref 1.6–2.6)
MCHC RBC-ENTMCNC: 29.8 PG — SIGNIFICANT CHANGE UP (ref 27–34)
MCHC RBC-ENTMCNC: 33.1 GM/DL — SIGNIFICANT CHANGE UP (ref 32–36)
MCV RBC AUTO: 90.1 FL — SIGNIFICANT CHANGE UP (ref 80–100)
MONOCYTES # BLD AUTO: 0.39 K/UL — SIGNIFICANT CHANGE UP (ref 0–0.9)
MONOCYTES NFR BLD AUTO: 6.9 % — SIGNIFICANT CHANGE UP (ref 2–14)
NEUTROPHILS # BLD AUTO: 3.71 K/UL — SIGNIFICANT CHANGE UP (ref 1.8–7.4)
NEUTROPHILS NFR BLD AUTO: 65.8 % — SIGNIFICANT CHANGE UP (ref 43–77)
PHOSPHATE SERPL-MCNC: 2.4 MG/DL — LOW (ref 2.5–4.5)
PLATELET # BLD AUTO: 231 K/UL — SIGNIFICANT CHANGE UP (ref 150–400)
POTASSIUM SERPL-MCNC: 3.7 MMOL/L — SIGNIFICANT CHANGE UP (ref 3.5–5.3)
POTASSIUM SERPL-SCNC: 3.7 MMOL/L — SIGNIFICANT CHANGE UP (ref 3.5–5.3)
PROT SERPL-MCNC: 6.7 GM/DL — SIGNIFICANT CHANGE UP (ref 6–8.3)
PROTHROM AB SERPL-ACNC: 11.3 SEC — SIGNIFICANT CHANGE UP (ref 10–12.9)
RBC # BLD: 4.13 M/UL — SIGNIFICANT CHANGE UP (ref 3.8–5.2)
RBC # FLD: 12 % — SIGNIFICANT CHANGE UP (ref 10.3–14.5)
SODIUM SERPL-SCNC: 141 MMOL/L — SIGNIFICANT CHANGE UP (ref 135–145)
WBC # BLD: 5.64 K/UL — SIGNIFICANT CHANGE UP (ref 3.8–10.5)
WBC # FLD AUTO: 5.64 K/UL — SIGNIFICANT CHANGE UP (ref 3.8–10.5)

## 2020-03-18 PROCEDURE — 99232 SBSQ HOSP IP/OBS MODERATE 35: CPT

## 2020-03-18 RX ORDER — ACETAMINOPHEN 500 MG
650 TABLET ORAL EVERY 6 HOURS
Refills: 0 | Status: DISCONTINUED | OUTPATIENT
Start: 2020-03-18 | End: 2020-03-20

## 2020-03-18 RX ORDER — ALPRAZOLAM 0.25 MG
0.25 TABLET ORAL ONCE
Refills: 0 | Status: DISCONTINUED | OUTPATIENT
Start: 2020-03-18 | End: 2020-03-18

## 2020-03-18 RX ORDER — ALPRAZOLAM 0.25 MG
0.25 TABLET ORAL
Refills: 0 | Status: DISCONTINUED | OUTPATIENT
Start: 2020-03-18 | End: 2020-03-20

## 2020-03-18 RX ADMIN — Medication 0.25 MILLIGRAM(S): at 23:27

## 2020-03-18 RX ADMIN — Medication 600 MILLIGRAM(S): at 21:52

## 2020-03-18 RX ADMIN — AZITHROMYCIN 500 MILLIGRAM(S): 500 TABLET, FILM COATED ORAL at 21:52

## 2020-03-18 RX ADMIN — Medication 0.25 MILLIGRAM(S): at 18:00

## 2020-03-18 RX ADMIN — CEFTRIAXONE 1000 MILLIGRAM(S): 500 INJECTION, POWDER, FOR SOLUTION INTRAMUSCULAR; INTRAVENOUS at 21:52

## 2020-03-18 NOTE — PROGRESS NOTE ADULT - ASSESSMENT
31 y/o F with PMH of IBS, anxiety, vasovagal syncope, vertigo, endometriosis, GERD, sleep apnea, p/w increasing SOB.    *Sepsis 2/2 COVID-19 w/ possible superimposed bacterial infection  -Supportive care for COVID-19  -Pulse ox monitoring continuously given that patient is p/w SOB and has significant infiltrates on CXR. Will monitor closely, low threshold for escalating level of care if needed  -Supplemental O2  -Isolation  -Patient has documented allergy to erythromycin, but reaction is vomiting, and patient also received zithromax in the ED, with no reaction. Will continue and monitor closely. C/w ceftriaxone  -F/u Blood cultures  -IVF  -ID consult  -As recommended by hospital, will avoid CT chest for now to limit exposure, and will defer to ID   -At bedside, patient appears to be stable, speaking in full sentences, without any accessory muscle use or respiratory distress.     *DVT ppx  -SCDs 31 y/o F with PMH of IBS, anxiety, vasovagal syncope, vertigo, endometriosis, GERD, sleep apnea, p/w on 3/18 /20  increasing SOB.    * Sepsis , COVID-19 Viral  Pneumonia  possible superimposed bacterial infection   - isolation, O2 sats on RA wnl, no need for pulse oxymetry at this point, blood cx x2, c/w ceftriaxone, azithromycin, repeat cbc in am , s/p IVF , ID consult  -As recommended by hospital, will avoid CT chest for now to limit exposure, and will defer to ID   -At bedside, patient appears to be stable, speaking in full sentences, without any accessory muscle use or respiratory distress.   * Mild hypophosphatemia - likely due to poor nutrition   *DVT ppx - -SCDs

## 2020-03-18 NOTE — PROGRESS NOTE ADULT - SUBJECTIVE AND OBJECTIVE BOX
Subjective:  Patient is a 30y old  Female who presents with a chief complaint of SOB (18 Mar 2020 12:10)    HPI:  31 y/o F with PMH of IBS, anxiety, vasovagal syncope, vertigo, endometriosis, GERD, sleep apnea, p/w increasing SOB. Patient states that she developed body aches about 4 days ago, and then developed a non-productive cough and runny nose. States she had a fever of 104 on Saturday and went to urgent care center and was tested for COVID19. She was informed later that she was positive for COVID19. States she came to ED today because she developed SOB, states she feels like she has to make effort to take a deep breath, but otherwise feels comfortable. Also c/o feeling very anxious about her diagnosis. Only other complaint is that her stomach feels uneasy, but denies abdominal pain. Denies nausea, vomiting, diarrhea, dysuria, increased frequency.     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    Social Hx: Denies tobacco, etoh - on the weekends socially, drugs - denies     Family Hx: Denies (17 Mar 2020 21:33)         Patient seen and examined at bedside earlier today,     Review of system- Rest of the review of system are negative except mentioned in HPI    OBJECTIVE:   T(C): 37.1 (03-18-20 @ 16:18), Max: 37.1 (03-18-20 @ 07:02)  HR: 106 (03-18-20 @ 16:18) (72 - 106)  BP: 115/75 (03-18-20 @ 16:18) (111/79 - 134/66)  RR: 18 (03-18-20 @ 16:18) (15 - 27)  SpO2: 100% (03-18-20 @ 16:18) (98% - 100%)  Wt(kg): --  Daily     Daily     PHYSICAL EXAM:  GENERAL: NAD  NERVOUS SYSTEM:  Alert & Oriented X3, non- focal exam,  Motor Strength 5/5 B/L upper and lower extremities; DTRs 2+ intact and symmetric  HEAD:  Atraumatic, Normocephalic  EYES: EOMI, PERRLA, conjunctiva and sclera clear  HEENT: Moist mucous membranes  NECK: Supple, No JVD  CHEST/LUNG: Clear to auscultation bilaterally; No rales, no rhonchi, no wheezing  HEART: Regular rate and rhythm; No murmurs, no rubs or gallops  ABDOMEN: Soft, Nontender, Nondistended; Bowel sounds present  GENITOURINARY- Voiding, no suprapubic tenderness  EXTREMITIES:  2+ Peripheral Pulses, No clubbing, cyanosis,   edema  MUSCULOSKELETAL:- No muscle tenderness, Muscle tone normal, No joint tenderness, no Joint swelling,  Joint ROM -normal  SKIN-no rash, no lesion    LABS: all reviewed                         12.3   5.64  )-----------( 231      ( 18 Mar 2020 06:31 )             37.2     03-18    141  |  109<H>  |  4<L>  ----------------------------<  70  3.7   |  26  |  0.59    Ca    8.5      18 Mar 2020 06:31  Phos  2.4     03-18  Mg     2.0     03-18    TPro  6.7  /  Alb  2.8<L>  /  TBili  0.2  /  DBili  x   /  AST  17  /  ALT  15  /  AlkPhos  43  03-18    PT/INR - ( 18 Mar 2020 06:31 )   PT: 11.3 sec;   INR: 1.02 ratio         PTT - ( 18 Mar 2020 06:31 )  PTT:33.4 sec          CAPILLARY BLOOD GLUCOSE            RECENT CULTURES:    RADIOLOGY & ADDITIONAL TESTS: all reviewed   EKG reviewed        Current medications:  acetaminophen   Tablet .. 650 milliGRAM(s) Oral every 6 hours PRN  ALPRAZolam 0.25 milliGRAM(s) Oral two times a day PRN  azithromycin   Tablet 500 milliGRAM(s) Oral daily  cefTRIAXone Injectable. 1000 milliGRAM(s) IV Push every 24 hours  guaiFENesin  milliGRAM(s) Oral every 12 hours  sodium chloride 0.9%. 1000 milliLiter(s) IV Continuous <Continuous> Subjective:  Patient is a 30y old  Female who presents with a chief complaint of SOB   HPI:       31 y/o F with PMH of IBS, anxiety, vasovagal syncope, vertigo, endometriosis, GERD, sleep apnea admitted on 3/18/20 increasing SOB. Patient states that she developed body aches about 4 days ago, and then developed a non-productive cough and runny nose. States she had a fever of 104 on Saturday and went to urgent care center and was tested for COVID19. She was informed later that she was positive for COVID19. States she came to ED today because she developed SOB, states she feels like she has to make effort to take a deep breath, but otherwise feels comfortable. Also c/o feeling very anxious about her diagnosis. Only other complaint is that her stomach feels uneasy, but denies abdominal pain.   3/18 - Patient seen and examined at bedside earlier today, reports dyspnea, pleuritic chest discomfort, + anxious , denies abdominal pain, POC discussed     Review of system- Rest of the review of system are negative except mentioned in HPI    T(C): 37.1 (03-18-20 @ 16:18), Max: 37.1 (03-18-20 @ 07:02)  T(F): 98.7 (03-18-20 @ 16:18), Max: 98.8 (03-18-20 @ 12:10)  HR: 106 (03-18-20 @ 16:18) (72 - 106)  BP: 115/75 (03-18-20 @ 16:18) (111/79 - 134/66)  RR: 18 (03-18-20 @ 16:18) (16 - 20)  SpO2: 100% (03-18-20 @ 16:18) (99% - 100%)  Wt(kg): --      PHYSICAL EXAM:  GENERAL: NAD  NERVOUS SYSTEM:  Alert & Oriented X3, non- focal exam,  Motor Strength 5/5 B/L upper and lower extremities; DTRs 2+ intact and symmetric  HEAD:  Atraumatic, Normocephalic  EYES: EOMI, PERRLA, conjunctiva and sclera clear  HEENT: Moist mucous membranes  NECK: Supple, No JVD  CHEST/LUNG: BS decreased at bases, No rales, +  rhonchi, no wheezing  HEART: Regular rate and rhythm; No murmurs, no rubs or gallops  ABDOMEN: Soft, Nontender, Nondistended; Bowel sounds present  GENITOURINARY- Voiding, no suprapubic tenderness  EXTREMITIES:  2+ Peripheral Pulses, No clubbing, cyanosis,   edema  MUSCULOSKELETAL:- No muscle tenderness, Muscle tone normal, No joint tenderness, no Joint swelling,  Joint ROM -normal  SKIN-no rash, no lesion    LABS: all reviewed                         12.3   5.64  )-----------( 231      ( 18 Mar 2020 06:31 )             37.2     03-18    141  |  109<H>  |  4<L>  ----------------------------<  70  3.7   |  26  |  0.59    Ca    8.5      18 Mar 2020 06:31  Phos  2.4     03-18  Mg     2.0     03-18    TPro  6.7  /  Alb  2.8<L>  /  TBili  0.2  /  DBili  x   /  AST  17  /  ALT  15  /  AlkPhos  43  03-18    PT/INR - ( 18 Mar 2020 06:31 )   PT: 11.3 sec;   INR: 1.02 ratio         PTT - ( 18 Mar 2020 06:31 )  PTT:33.4 sec        CAPILLARY BLOOD GLUCOSE    RECENT CULTURES:    RADIOLOGY & ADDITIONAL TESTS: all reviewed   EKG reviewed  < from: Xray Chest 1 View AP/PA. (03.17.20 @ 18:17) >  No change heart mediastinum. There are new extensive bilateral lower lobe consolidative infiltrates consistent with pneumonia in the appropriate clinical setting. The appearance is nonspecific but cough. Infection cannot be excluded by the radiographic appearance alone. No pleural collections.    Impression: As above    Discussed with   prior to this dictation      < end of copied text >            Current medications:  acetaminophen   Tablet .. 650 milliGRAM(s) Oral every 6 hours PRN  ALPRAZolam 0.25 milliGRAM(s) Oral two times a day PRN  azithromycin   Tablet 500 milliGRAM(s) Oral daily  cefTRIAXone Injectable. 1000 milliGRAM(s) IV Push every 24 hours  guaiFENesin  milliGRAM(s) Oral every 12 hours  sodium chloride 0.9%. 1000 milliLiter(s) IV Continuous <Continuous>

## 2020-03-18 NOTE — CONSULT NOTE ADULT - SUBJECTIVE AND OBJECTIVE BOX
Patient is a 30y old  Female who presents with a chief complaint of SOB (17 Mar 2020 21:33)    HPI:  31 y/o F with PMH of IBS, anxiety, vasovagal syncope, vertigo, endometriosis, GERD, sleep apnea, p/w increasing SOB. Patient states that she developed body aches about 4 days ago, and then developed a non-productive cough and runny nose. States she had a fever of 104 on 3/14 and went to urgent care center and was tested for COVID19. She was informed later that she was positive for COVID19. States she came to ED 3/17 because she developed SOB, states she feels like she has to make effort to take a deep breath, but otherwise feels comfortable. Also c/o feeling very anxious about her diagnosis. Here afebrile, nl wbc ct, xray with b/l infiltrates, was given IV rocephin/azithro.     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 1000 milliGRAM(s) IV Push every 24 hours  guaiFENesin  milliGRAM(s) Oral every 12 hours  sodium chloride 0.9%. 1000 milliLiter(s) (100 mL/Hr) IV Continuous <Continuous>      Allergies    Cipro (Unknown)  codeine (Unknown)  Pediazole (Other)  sulfa drugs (Unknown)    Intolerances      Social: no smoking, no alcohol, no illegal drugs; no recent travel, no exposure to TB  FAMILY HISTORY:     no history of premature cardiovascular disease in first degree relatives    ROS:  no HA, no dizziness, no sore throat, no blurry vision, no CP, no palpitations, no abdominal pain, no diarrhea, no N/V, no dysuria, no leg pain, no claudication, no rash, no joint aches, no rectal pain or bleeding, no night sweats  All other systems reviewed and are negative    Vital Signs Last 24 Hrs  T(C): 37.1 (18 Mar 2020 16:18), Max: 37.1 (18 Mar 2020 07:02)  T(F): 98.7 (18 Mar 2020 16:18), Max: 98.8 (18 Mar 2020 12:10)  HR: 106 (18 Mar 2020 16:18) (72 - 121)  BP: 115/75 (18 Mar 2020 16:18) (108/80 - 137/87)  BP(mean): --  RR: 18 (18 Mar 2020 16:18) (15 - 30)  SpO2: 100% (18 Mar 2020 16:18) (97% - 100%)  Daily     Daily     PE:  Constitutional: frail looking  HEENT: NC/AT, EOMI, PERRLA, conjunctivae clear; ears and nose atraumatic; pharynx benign  Neck: supple; thyroid not palpable  Back: no tenderness  Respiratory: decreased breath sounds  Cardiovascular: S1S2 regular, no murmurs  Abdomen: soft, not tender, not distended, positive BS; liver and spleen WNL  Genitourinary: no suprapubic tenderness  Lymphatic: no LN palpable  Musculoskeletal: no muscle tenderness, no joint swelling or tenderness  Extremities: no pedal edema  Neurological/ Psychiatric: moving all extremities  Skin: no rashes; no palpable lesions    Labs: all available labs reviewed                        12.3   5.64  )-----------( 231      ( 18 Mar 2020 06:31 )             37.2     03-18    141  |  109<H>  |  4<L>  ----------------------------<  70  3.7   |  26  |  0.59    Ca    8.5      18 Mar 2020 06:31  Phos  2.4     03-18  Mg     2.0     03-18    TPro  6.7  /  Alb  2.8<L>  /  TBili  0.2  /  DBili  x   /  AST  17  /  ALT  15  /  AlkPhos  43  03-18     LIVER FUNCTIONS - ( 18 Mar 2020 06:31 )  Alb: 2.8 g/dL / Pro: 6.7 gm/dL / ALK PHOS: 43 U/L / ALT: 15 U/L / AST: 17 U/L / GGT: x                 Radiology: all available radiological tests reviewed  < from: Xray Chest 1 View AP/PA. (03.17.20 @ 18:17) >  EXAM:  XR CHEST 1 VIEW                            PROCEDURE DATE:  03/17/2020          INTERPRETATION:  History: COVID positive    AP chest. Prior dated 2/14/2019.    No change heart mediastinum. There are new extensive bilateral lower lobe consolidative infiltrates consistent with pneumonia in the appropriate clinical setting. The appearance is nonspecific but cough. Infection cannot be excluded by the radiographic appearance alone. No pleural collections.    Impression: As above    Discussed with   prior to this dictation        Advanced directives addressed: full resuscitation

## 2020-03-18 NOTE — ED ADULT NURSE REASSESSMENT NOTE - NS ED NURSE REASSESS COMMENT FT1
MD Giuliana chang'd 1 set of blood cultures prior to abx
Patient ambulating to rest room all night, additional snacks provided with ginger ales. Patient more calm and cooperative, as per mid shift RN took her own daily doze of Xanax at approx 10p. Denies chest pain, return of fevers or flu like symptoms. Patient is mostly lonely related to her isolation all night. Instructed that she should try and get some sleep but states that even when she is home she is restless all night. Patient just would like continuous information about her progress and care. Patient will be assigned a bed after change of shift. Safety maintained, needs attended, will continue to monitor.
Pt alert and oriented x3 VSS afebrile. Pt resting comfortably states "I am feeling better". Pt currently satting 100% on room air, has been on room air all night comfortable. IVF infusing-breakfast ordered. Infectious disease doctor at bedside. Pt anxious- informed nurse she took her own xanax last night. Pt told not to take any more of her own meds and Dr. Simon made aware pt needs a prn order for Xanax. Pt also informed RN of her cardiac history of "POTS" with palpitations-pt does not follow with cardiologist or take any meds. Last admit order is for [facility]-RN clarified with Dr. Simon and she stated "Place the pt where the last order stated to place" admitting made aware pt does not need CCU, can go to [facility]. All needs addressed, safety maintained and isolation remains.

## 2020-03-18 NOTE — CONSULT NOTE ADULT - ASSESSMENT
31 y/o F with PMH of IBS, anxiety, vasovagal syncope, vertigo, endometriosis, GERD, sleep apnea, p/w increasing SOB. Patient states that she developed body aches about 4 days ago, and then developed a non-productive cough and runny nose. States she had a fever of 104 on 3/14 and went to urgent care center and was tested for COVID19. She was informed later that she was positive for COVID19. States she came to ED 3/17 because she developed SOB, states she feels like she has to make effort to take a deep breath, but otherwise feels comfortable. Also c/o feeling very anxious about her diagnosis. Here afebrile, nl wbc ct, xray with b/l infiltrates, was given IV rocephin/azithro.     1. viral pneumonia with COVID-19  - agree with IV rocephin 1gm daily   - agree with azithromycin 500mg daily  - abx for possible superimposed bacterial pna  - f/u cultures  - monitor resp status closely  - monitor temps  - tolerating abx well so far; no side effects noted  - reason for abx use and side effects reviewed with patient  - supportive care  - fu cbc    2. other issues - care per medicine 29 y/o F with PMH of IBS, anxiety, vasovagal syncope, vertigo, endometriosis, GERD, sleep apnea, p/w increasing SOB. Patient states that she developed body aches about 4 days ago, and then developed a non-productive cough and runny nose. States she had a fever of 104 on 3/14 and went to urgent care center and was tested for COVID19. She was informed later that she was positive for COVID19. States she came to ED 3/17 because she developed SOB, states she feels like she has to make effort to take a deep breath, but otherwise feels comfortable. Also c/o feeling very anxious about her diagnosis. Here afebrile, nl wbc ct, xray with b/l infiltrates, was given IV rocephin/azithro.     1. fever. cough. viral pneumonia with COVID-19  - agree with IV rocephin 1gm daily   - agree with azithromycin 500mg daily  - abx for possible superimposed bacterial pna  - f/u cultures  - monitor resp status closely  - monitor temps  - tolerating abx well so far; no side effects noted  - reason for abx use and side effects reviewed with patient  - supportive care  - fu cbc    2. other issues - care per medicine

## 2020-03-19 LAB
ANION GAP SERPL CALC-SCNC: 7 MMOL/L — SIGNIFICANT CHANGE UP (ref 5–17)
BASOPHILS # BLD AUTO: 0.02 K/UL — SIGNIFICANT CHANGE UP (ref 0–0.2)
BASOPHILS NFR BLD AUTO: 0.4 % — SIGNIFICANT CHANGE UP (ref 0–2)
BUN SERPL-MCNC: 7 MG/DL — SIGNIFICANT CHANGE UP (ref 7–23)
CALCIUM SERPL-MCNC: 9.4 MG/DL — SIGNIFICANT CHANGE UP (ref 8.5–10.1)
CHLORIDE SERPL-SCNC: 107 MMOL/L — SIGNIFICANT CHANGE UP (ref 96–108)
CO2 SERPL-SCNC: 25 MMOL/L — SIGNIFICANT CHANGE UP (ref 22–31)
CREAT SERPL-MCNC: 0.88 MG/DL — SIGNIFICANT CHANGE UP (ref 0.5–1.3)
CRP SERPL-MCNC: 2.5 MG/DL — HIGH (ref 0–0.4)
EOSINOPHIL # BLD AUTO: 0.03 K/UL — SIGNIFICANT CHANGE UP (ref 0–0.5)
EOSINOPHIL NFR BLD AUTO: 0.6 % — SIGNIFICANT CHANGE UP (ref 0–6)
GLUCOSE SERPL-MCNC: 83 MG/DL — SIGNIFICANT CHANGE UP (ref 70–99)
HCT VFR BLD CALC: 42 % — SIGNIFICANT CHANGE UP (ref 34.5–45)
HGB BLD-MCNC: 14 G/DL — SIGNIFICANT CHANGE UP (ref 11.5–15.5)
IMM GRANULOCYTES NFR BLD AUTO: 0.4 % — SIGNIFICANT CHANGE UP (ref 0–1.5)
LYMPHOCYTES # BLD AUTO: 0.95 K/UL — LOW (ref 1–3.3)
LYMPHOCYTES # BLD AUTO: 19 % — SIGNIFICANT CHANGE UP (ref 13–44)
MCHC RBC-ENTMCNC: 30.2 PG — SIGNIFICANT CHANGE UP (ref 27–34)
MCHC RBC-ENTMCNC: 33.3 GM/DL — SIGNIFICANT CHANGE UP (ref 32–36)
MCV RBC AUTO: 90.5 FL — SIGNIFICANT CHANGE UP (ref 80–100)
MONOCYTES # BLD AUTO: 0.26 K/UL — SIGNIFICANT CHANGE UP (ref 0–0.9)
MONOCYTES NFR BLD AUTO: 5.2 % — SIGNIFICANT CHANGE UP (ref 2–14)
NEUTROPHILS # BLD AUTO: 3.72 K/UL — SIGNIFICANT CHANGE UP (ref 1.8–7.4)
NEUTROPHILS NFR BLD AUTO: 74.4 % — SIGNIFICANT CHANGE UP (ref 43–77)
PLATELET # BLD AUTO: 259 K/UL — SIGNIFICANT CHANGE UP (ref 150–400)
POTASSIUM SERPL-MCNC: 3.7 MMOL/L — SIGNIFICANT CHANGE UP (ref 3.5–5.3)
POTASSIUM SERPL-SCNC: 3.7 MMOL/L — SIGNIFICANT CHANGE UP (ref 3.5–5.3)
RBC # BLD: 4.64 M/UL — SIGNIFICANT CHANGE UP (ref 3.8–5.2)
RBC # FLD: 12.2 % — SIGNIFICANT CHANGE UP (ref 10.3–14.5)
SODIUM SERPL-SCNC: 139 MMOL/L — SIGNIFICANT CHANGE UP (ref 135–145)
WBC # BLD: 5 K/UL — SIGNIFICANT CHANGE UP (ref 3.8–10.5)
WBC # FLD AUTO: 5 K/UL — SIGNIFICANT CHANGE UP (ref 3.8–10.5)

## 2020-03-19 PROCEDURE — 99232 SBSQ HOSP IP/OBS MODERATE 35: CPT

## 2020-03-19 PROCEDURE — 71045 X-RAY EXAM CHEST 1 VIEW: CPT | Mod: 26

## 2020-03-19 RX ORDER — BENZOCAINE AND MENTHOL 5; 1 G/100ML; G/100ML
1 LIQUID ORAL
Refills: 0 | Status: DISCONTINUED | OUTPATIENT
Start: 2020-03-19 | End: 2020-03-20

## 2020-03-19 RX ORDER — ONDANSETRON 8 MG/1
4 TABLET, FILM COATED ORAL EVERY 6 HOURS
Refills: 0 | Status: DISCONTINUED | OUTPATIENT
Start: 2020-03-19 | End: 2020-03-20

## 2020-03-19 RX ADMIN — Medication 600 MILLIGRAM(S): at 09:22

## 2020-03-19 RX ADMIN — CEFTRIAXONE 1000 MILLIGRAM(S): 500 INJECTION, POWDER, FOR SOLUTION INTRAMUSCULAR; INTRAVENOUS at 21:13

## 2020-03-19 RX ADMIN — Medication 600 MILLIGRAM(S): at 21:13

## 2020-03-19 RX ADMIN — AZITHROMYCIN 500 MILLIGRAM(S): 500 TABLET, FILM COATED ORAL at 09:22

## 2020-03-19 RX ADMIN — Medication 0.25 MILLIGRAM(S): at 21:13

## 2020-03-19 RX ADMIN — BENZOCAINE AND MENTHOL 1 LOZENGE: 5; 1 LIQUID ORAL at 18:33

## 2020-03-19 RX ADMIN — Medication 0.25 MILLIGRAM(S): at 10:32

## 2020-03-19 RX ADMIN — Medication 650 MILLIGRAM(S): at 18:32

## 2020-03-19 NOTE — PROGRESS NOTE ADULT - ASSESSMENT
29 y/o F with PMH of IBS, anxiety, vasovagal syncope, vertigo, endometriosis, GERD, sleep apnea, p/w on 3/18 /20  increasing SOB.    * Sepsis , COVID-19 Viral  Pneumonia  possible superimposed bacterial infection   - isolation, O2 sats on RA wnl, no need for pulse oxymetry at this point, blood cx x2, c/w ceftriaxone, azithromycin, repeat  cbc - low lymphocytes, CRP high, CXR 3/19 - improved,   , s/p IVF , ID consult   -As recommended by hospital, will avoid CT chest for now to limit exposure, and will defer to ID   -At bedside, patient appears to be stable, speaking in full sentences, without any accessory muscle use or respiratory distress.   * Mild hypophosphatemia - likely due to poor nutrition   * Sinus tachycardia due to Postural Orthostatic Tachycardia Syndrome (POTS) - baseline Hr 110-120  * Anxiety - xanax prn   * Nausea in am after blood draw due to anxiety  * Sore throat - Cepacol lozenges     *DVT ppx - -SCDs

## 2020-03-19 NOTE — PROGRESS NOTE ADULT - SUBJECTIVE AND OBJECTIVE BOX
Subjective:  Patient is a 30y old  Female who presents with a chief complaint of SOB   HPI:       31 y/o F with PMH of IBS, anxiety, vasovagal syncope, vertigo, endometriosis, GERD, sleep apnea admitted on 3/18/20 increasing SOB. Patient states that she developed body aches about 4 days ago, and then developed a non-productive cough and runny nose. States she had a fever of 104 on Saturday and went to urgent care center and was tested for COVID19. She was informed later that she was positive for COVID19. States she came to ED today because she developed SOB, states she feels like she has to make effort to take a deep breath, but otherwise feels comfortable. Also c/o feeling very anxious about her diagnosis. Only other complaint is that her stomach feels uneasy, but denies abdominal pain.   3/18 - Patient seen and examined at bedside earlier today, reports dyspnea, pleuritic chest discomfort, + anxious , denies abdominal pain, POC discussed   3/19 - pt seen and examined , denies cp, cough dry , denies dyspnea, feels better, afebrile, POC discussed     Review of system- Rest of the review of system are negative except mentioned in HPI    T(C): 37.7 (03-19-20 @ 17:35), Max: 37.8 (03-19-20 @ 15:55)  T(F): 99.8 (03-19-20 @ 17:35), Max: 100 (03-19-20 @ 15:55)  HR: 111 (03-19-20 @ 15:55) (111 - 112)  BP: 125/81 (03-19-20 @ 15:55) (108/72 - 125/81)  RR: 20 (03-19-20 @ 15:55) (18 - 20)  SpO2: 99% (03-19-20 @ 15:55) (98% - 100%)  Wt(kg): --      PHYSICAL EXAM:  GENERAL: NAD  NERVOUS SYSTEM:  Alert & Oriented X3, non- focal exam,  Motor Strength 5/5 B/L upper and lower extremities; DTRs 2+ intact and symmetric  HEAD:  Atraumatic, Normocephalic  EYES: EOMI, PERRLA, conjunctiva and sclera clear  HEENT: Moist mucous membranes  NECK: Supple, No JVD  CHEST/LUNG: BS decreased at bases, No rales, +  rhonchi, no wheezing  HEART: Regular rate and rhythm; No murmurs, no rubs or gallops  ABDOMEN: Soft, Nontender, Nondistended; Bowel sounds present  GENITOURINARY- Voiding, no suprapubic tenderness  EXTREMITIES:  2+ Peripheral Pulses, No clubbing, cyanosis,   edema  MUSCULOSKELETAL:- No muscle tenderness, Muscle tone normal, No joint tenderness, no Joint swelling,  Joint ROM -normal  SKIN-no rash, no lesion    LABS: all reviewed   03-19    139  |  107  |  7   ----------------------------<  83  3.7   |  25  |  0.88    Ca    9.4      19 Mar 2020 08:11  Phos  2.4     03-18  Mg     2.0     03-18    TPro  6.7  /  Alb  2.8<L>  /  TBili  0.2  /  DBili  x   /  AST  17  /  ALT  15  /  AlkPhos  43  03-18                        14.0   5.00  )-----------( 259      ( 19 Mar 2020 08:11 )             42.0             LIVER FUNCTIONS - ( 18 Mar 2020 06:31 )  Alb: 2.8 g/dL / Pro: 6.7 gm/dL / ALK PHOS: 43 U/L / ALT: 15 U/L / AST: 17 U/L / GGT: x             PT/INR - ( 18 Mar 2020 06:31 )   PT: 11.3 sec;   INR: 1.02 ratio         PTT - ( 18 Mar 2020 06:31 )  PTT:33.4 sec                            12.3   5.64  )-----------( 231      ( 18 Mar 2020 06:31 )             37.2     03-18    141  |  109<H>  |  4<L>  ----------------------------<  70  3.7   |  26  |  0.59    Ca    8.5      18 Mar 2020 06:31  Phos  2.4     03-18  Mg     2.0     03-18    TPro  6.7  /  Alb  2.8<L>  /  TBili  0.2  /  DBili  x   /  AST  17  /  ALT  15  /  AlkPhos  43  03-18    PT/INR - ( 18 Mar 2020 06:31 )   PT: 11.3 sec;   INR: 1.02 ratio         PTT - ( 18 Mar 2020 06:31 )  PTT:33.4 sec        CAPILLARY BLOOD GLUCOSE    RECENT CULTURES:    RADIOLOGY & ADDITIONAL TESTS: all reviewed   EKG reviewed  < from: Xray Chest 1 View- PORTABLE-Routine (03.19.20 @ 11:44) >    FINDINGS: Stable and unremarkable cardiac, hilar and mediastinal contours. Significant clearing of bilateral lower lobe airspace opacities. No new areas of consolidation. No pleural effusion or pneumothorax.     IMPRESSION:    Improving bilateral disease.    < end of copied text >    < from: Xray Chest 1 View AP/PA. (03.17.20 @ 18:17) >  No change heart mediastinum. There are new extensive bilateral lower lobe consolidative infiltrates consistent with pneumonia in the appropriate clinical setting. The appearance is nonspecific but cough. Infection cannot be excluded by the radiographic appearance alone. No pleural collections.    Impression: As above    Discussed with   prior to this dictation      < end of copied text >            Current medications:  acetaminophen   Tablet .. 650 milliGRAM(s) Oral every 6 hours PRN  ALPRAZolam 0.25 milliGRAM(s) Oral two times a day PRN  azithromycin   Tablet 500 milliGRAM(s) Oral daily  cefTRIAXone Injectable. 1000 milliGRAM(s) IV Push every 24 hours  guaiFENesin  milliGRAM(s) Oral every 12 hours  sodium chloride 0.9%. 1000 milliLiter(s) IV Continuous <Continuous>

## 2020-03-20 ENCOUNTER — TRANSCRIPTION ENCOUNTER (OUTPATIENT)
Age: 30
End: 2020-03-20

## 2020-03-20 VITALS
HEART RATE: 118 BPM | TEMPERATURE: 97 F | RESPIRATION RATE: 18 BRPM | OXYGEN SATURATION: 97 % | SYSTOLIC BLOOD PRESSURE: 116 MMHG | DIASTOLIC BLOOD PRESSURE: 84 MMHG

## 2020-03-20 PROCEDURE — 99239 HOSP IP/OBS DSCHRG MGMT >30: CPT

## 2020-03-20 RX ORDER — CEFUROXIME AXETIL 250 MG
1 TABLET ORAL
Qty: 6 | Refills: 0
Start: 2020-03-20 | End: 2020-03-22

## 2020-03-20 RX ORDER — AZITHROMYCIN 500 MG/1
1 TABLET, FILM COATED ORAL
Qty: 1 | Refills: 0
Start: 2020-03-20 | End: 2020-03-20

## 2020-03-20 RX ADMIN — Medication 650 MILLIGRAM(S): at 10:13

## 2020-03-20 RX ADMIN — Medication 0.25 MILLIGRAM(S): at 10:12

## 2020-03-20 RX ADMIN — Medication 600 MILLIGRAM(S): at 10:13

## 2020-03-20 RX ADMIN — AZITHROMYCIN 500 MILLIGRAM(S): 500 TABLET, FILM COATED ORAL at 10:13

## 2020-03-20 NOTE — PROGRESS NOTE ADULT - SUBJECTIVE AND OBJECTIVE BOX
Date of service: 03-20-20 @ 08:32    pt seen and examined  temps down, feels much better  has productive cough, no sob    ROS:  denies dizziness, no HA,  no abdominal pain, no diarrhea or constipation; no dysuria, no urinary frequency, no legs pain, no rashes    MEDICATIONS  (STANDING):  azithromycin   Tablet 500 milliGRAM(s) Oral daily  cefTRIAXone Injectable. 1000 milliGRAM(s) IV Push every 24 hours  guaiFENesin  milliGRAM(s) Oral every 12 hours  sodium chloride 0.9%. 1000 milliLiter(s) (100 mL/Hr) IV Continuous <Continuous>      Vital Signs Last 24 Hrs  T(C): 36.3 (20 Mar 2020 08:27), Max: 37.8 (19 Mar 2020 15:55)  T(F): 97.4 (20 Mar 2020 08:27), Max: 100 (19 Mar 2020 15:55)  HR: 118 (20 Mar 2020 08:27) (99 - 118)  BP: 116/84 (20 Mar 2020 08:27) (108/72 - 125/81)  BP(mean): --  RR: 18 (20 Mar 2020 08:27) (18 - 20)  SpO2: 97% (20 Mar 2020 08:27) (97% - 99%)      PE:  Constitutional: frail looking  HEENT: NC/AT, EOMI, PERRLA, conjunctivae clear; ears and nose atraumatic; pharynx benign  Neck: supple; thyroid not palpable  Back: no tenderness  Respiratory: decreased breath sounds  Cardiovascular: S1S2 regular, no murmurs  Abdomen: soft, not tender, not distended, positive BS; liver and spleen WNL  Genitourinary: no suprapubic tenderness  Lymphatic: no LN palpable  Musculoskeletal: no muscle tenderness, no joint swelling or tenderness  Extremities: no pedal edema  Neurological/ Psychiatric: moving all extremities  Skin: no rashes; no palpable lesions    Labs: all available labs reviewed                                   14.0   5.00  )-----------( 259      ( 19 Mar 2020 08:11 )             42.0     03-19    139  |  107  |  7   ----------------------------<  83  3.7   |  25  |  0.88    Ca    9.4      19 Mar 2020 08:11            Radiology: all available radiological tests reviewed    < from: Xray Chest 1 View- PORTABLE-Routine (03.19.20 @ 11:44) >    EXAM:  XR CHEST PORTABLE ROUTINE 1V                            PROCEDURE DATE:  03/19/2020          INTERPRETATION:  Clinical information: Cough. Follow-up pneumonia.    Portable upright chest x-ray from 11:20 AM    COMPARISON: March 17, 2020    FINDINGS: Stable and unremarkable cardiac, hilar and mediastinal contours. Significant clearing of bilateral lower lobe airspace opacities. No new areas of consolidation. No pleural effusion or pneumothorax.     IMPRESSION:    Improving bilateral disease.        < from: Xray Chest 1 View AP/PA. (03.17.20 @ 18:17) >  EXAM:  XR CHEST 1 VIEW                            PROCEDURE DATE:  03/17/2020          INTERPRETATION:  History: COVID positive    AP chest. Prior dated 2/14/2019.    No change heart mediastinum. There are new extensive bilateral lower lobe consolidative infiltrates consistent with pneumonia in the appropriate clinical setting. The appearance is nonspecific but cough. Infection cannot be excluded by the radiographic appearance alone. No pleural collections.    Impression: As above    Discussed with   prior to this dictation        Advanced directives addressed: full resuscitation

## 2020-03-20 NOTE — DISCHARGE NOTE PROVIDER - HOSPITAL COURSE
Patient is a 30y old  Female who presents with a chief complaint of SOB     HPI:         29 y/o F with PMH of IBS, anxiety, vasovagal syncope, vertigo, endometriosis, GERD, sleep apnea admitted on 3/18/20 increasing SOB. Patient states that she developed body aches about 4 days ago, and then developed a non-productive cough and runny nose. States she had a fever of 104 on Saturday and went to urgent care center and was tested for COVID19. She was informed later that she was positive for COVID19. States she came to ED today because she developed SOB, states she feels like she has to make effort to take a deep breath, but otherwise feels comfortable. Also c/o feeling very anxious about her diagnosis. Only other complaint is that her stomach feels uneasy, but denies abdominal pain.     3/18 - Patient seen and examined at bedside earlier today, reports dyspnea, pleuritic chest discomfort, + anxious , denies abdominal pain, POC discussed     3/19 - pt seen and examined , denies cp, cough dry , denies dyspnea, feels better, afebrile, POC discussed     3/20 - no events, cleared by ID to go home, spoke with patient feels better, denies dyspnea, sore throat, afebrile, no new sx, POC discussed     Review of system- Rest of the review of system are negative except mentioned in HPI    T(C): 36.3 (03-20-20 @ 08:27), Max: 37.8 (03-19-20 @ 15:55)    T(F): 97.4 (03-20-20 @ 08:27), Max: 100 (03-19-20 @ 15:55)    HR: 118 (03-20-20 @ 08:27) (99 - 118)    BP: 116/84 (03-20-20 @ 08:27) (108/72 - 125/81)    RR: 18 (03-20-20 @ 08:27) (18 - 20)    SpO2: 97% (03-20-20 @ 08:27) (97% - 99%)    Wt(kg): --    NERVOUS SYSTEM:  Alert & Oriented X3    HEAD:  Atraumatic, Normocephalic    EYES: EOMI, PERRLA, conjunctiva and sclera clear    HEENT: Moist mucous membranes    NECK: Supple, No JVD    CHEST/LUNG: BS decreased at bases, No rales, no rhonchi, no wheezing    ABDOMEN: Soft, Nontender, Nondistended; Bowel sounds present    GENITOURINARY- Voiding, no suprapubic tenderness        29 y/o F with PMH of IBS, anxiety, vasovagal syncope, vertigo, endometriosis, GERD, sleep apnea, p/w on 3/18 /20  increasing SOB.        * Sepsis , COVID-19 Viral  Pneumonia  possible superimposed bacterial infection     - isolation, O2 sats on RA wnl, no need for pulse oxymetry at this point, blood cx x2, c/w ceftriaxone, azithromycin, repeat  cbc - low lymphocytes, CRP high, CXR 3/19 - improved,   , s/p IVF , ID consult  --> 1 more day of azithromycin, 3 days of ceftin 500 bid     -As recommended by hospital, will avoid CT chest for now to limit exposure, and will defer to ID     -At bedside, patient appears to be stable, speaking in full sentences, without any accessory muscle use or respiratory distress.     * Mild hypophosphatemia - likely due to poor nutrition     * Sinus tachycardia due to Postural Orthostatic Tachycardia Syndrome (POTS) - baseline Hr 110-120    * Anxiety - xanax prn     * Nausea in am after blood draw due to anxiety    * Sore throat - Cepacol lozenges     Disposition - medically optimized to be discharged home with close follow up with PCP within 1 week     complete antibiotics     return to ED if fever, abdominal pain, nausea, vomiting, chest pain, dyspnea    Discharge plan discussed with patient, RN    Patient advised to follow up with PCP within 3-7 days    time spend 40 min    Discharge note faxed to PCP with my contact information to call me back     PCP Dr. Merrill Greer

## 2020-03-20 NOTE — PROGRESS NOTE ADULT - ASSESSMENT
29 y/o F with PMH of IBS, anxiety, vasovagal syncope, vertigo, endometriosis, GERD, sleep apnea, p/w increasing SOB. Patient states that she developed body aches about 4 days ago, and then developed a non-productive cough and runny nose. States she had a fever of 104 on 3/14 and went to urgent care center and was tested for COVID19. She was informed later that she was positive for COVID19. States she came to ED 3/17 because she developed SOB, states she feels like she has to make effort to take a deep breath, but otherwise feels comfortable. Also c/o feeling very anxious about her diagnosis. Here afebrile, nl wbc ct, xray with b/l infiltrates, was given IV rocephin/azithro.     1. fever. cough. viral pneumonia with COVID-19  - temps down, repeat xray improved  - on IV rocephin 1gm daily #4  - on azithromycin 500mg daily #4  - abx for possible superimposed bacterial pna  - dc with oral ceftin complete 7 day course, 5 days azithro  - monitor resp status closely  - monitor temps  - tolerating abx well so far; no side effects noted  - reason for abx use and side effects reviewed with patient  - supportive care  - fu cbc    2. other issues - care per medicine

## 2020-03-20 NOTE — DISCHARGE NOTE NURSING/CASE MANAGEMENT/SOCIAL WORK - PATIENT PORTAL LINK FT
You can access the FollowMyHealth Patient Portal offered by [facility] by registering at the following website: [email or web address]. By joining [facility]’s FollowMyHealth portal, you will also be able to view your health information using other applications (apps) compatible with our system.

## 2020-03-20 NOTE — DISCHARGE NOTE PROVIDER - NSDCCPCAREPLAN_GEN_ALL_CORE_FT
PRINCIPAL DISCHARGE DIAGNOSIS  Diagnosis: Coronavirus infection  Assessment and Plan of Treatment: complete antibiotics, self-quarantine at home for 14 days, wear mask when outside , follow instruction that given to you upon discharge      SECONDARY DISCHARGE DIAGNOSES  Diagnosis: Pneumonia  Assessment and Plan of Treatment: repeat CXR in 1-2 weeks to establish resolution of pneumonia, follow up with PCP within 1 week

## 2020-03-20 NOTE — DISCHARGE NOTE PROVIDER - NSDCMRMEDTOKEN_GEN_ALL_CORE_FT
azithromycin 500 mg oral tablet: 1 tab(s) orally once a day   cefuroxime 500 mg oral tablet: 1 tab(s) orally 2 times a day   guaiFENesin 600 mg oral tablet, extended release: 1 tab(s) orally every 12 hours  Xanax 0.25 mg oral tablet: orally once a day, As Needed

## 2020-03-24 LAB
CULTURE RESULTS: SIGNIFICANT CHANGE UP
CULTURE RESULTS: SIGNIFICANT CHANGE UP
SPECIMEN SOURCE: SIGNIFICANT CHANGE UP
SPECIMEN SOURCE: SIGNIFICANT CHANGE UP

## 2020-03-27 DIAGNOSIS — R42 DIZZINESS AND GIDDINESS: ICD-10-CM

## 2020-03-27 DIAGNOSIS — K58.9 IRRITABLE BOWEL SYNDROME WITHOUT DIARRHEA: ICD-10-CM

## 2020-03-27 DIAGNOSIS — N80.9 ENDOMETRIOSIS, UNSPECIFIED: ICD-10-CM

## 2020-03-27 DIAGNOSIS — Z88.8 ALLERGY STATUS TO OTHER DRUGS, MEDICAMENTS AND BIOLOGICAL SUBSTANCES STATUS: ICD-10-CM

## 2020-03-27 DIAGNOSIS — Z88.1 ALLERGY STATUS TO OTHER ANTIBIOTIC AGENTS STATUS: ICD-10-CM

## 2020-03-27 DIAGNOSIS — F41.9 ANXIETY DISORDER, UNSPECIFIED: ICD-10-CM

## 2020-03-27 DIAGNOSIS — A41.9 SEPSIS, UNSPECIFIED ORGANISM: ICD-10-CM

## 2020-03-27 DIAGNOSIS — Z88.2 ALLERGY STATUS TO SULFONAMIDES: ICD-10-CM

## 2020-03-27 DIAGNOSIS — G47.30 SLEEP APNEA, UNSPECIFIED: ICD-10-CM

## 2020-03-27 DIAGNOSIS — J12.89 OTHER VIRAL PNEUMONIA: ICD-10-CM

## 2020-03-27 DIAGNOSIS — J02.9 ACUTE PHARYNGITIS, UNSPECIFIED: ICD-10-CM

## 2020-03-27 DIAGNOSIS — I49.8 OTHER SPECIFIED CARDIAC ARRHYTHMIAS: ICD-10-CM

## 2020-03-27 DIAGNOSIS — Z88.5 ALLERGY STATUS TO NARCOTIC AGENT: ICD-10-CM

## 2020-03-27 DIAGNOSIS — E83.39 OTHER DISORDERS OF PHOSPHORUS METABOLISM: ICD-10-CM

## 2020-03-27 DIAGNOSIS — J15.9 UNSPECIFIED BACTERIAL PNEUMONIA: ICD-10-CM

## 2020-03-27 DIAGNOSIS — B97.29 OTHER CORONAVIRUS AS THE CAUSE OF DISEASES CLASSIFIED ELSEWHERE: ICD-10-CM

## 2020-03-27 DIAGNOSIS — K21.9 GASTRO-ESOPHAGEAL REFLUX DISEASE WITHOUT ESOPHAGITIS: ICD-10-CM

## 2020-04-01 ENCOUNTER — OUTPATIENT (OUTPATIENT)
Dept: OUTPATIENT SERVICES | Facility: HOSPITAL | Age: 30
LOS: 1 days | End: 2020-04-01
Payer: MEDICAID

## 2020-04-01 PROCEDURE — G9001: CPT

## 2020-04-09 PROBLEM — F41.9 ANXIETY DISORDER, UNSPECIFIED: Chronic | Status: ACTIVE | Noted: 2020-03-17

## 2020-04-09 PROBLEM — K21.9 GASTRO-ESOPHAGEAL REFLUX DISEASE WITHOUT ESOPHAGITIS: Chronic | Status: ACTIVE | Noted: 2020-03-17

## 2020-04-09 PROBLEM — R00.0 TACHYCARDIA, UNSPECIFIED: Chronic | Status: ACTIVE | Noted: 2020-03-17

## 2020-04-09 PROBLEM — N80.9 ENDOMETRIOSIS, UNSPECIFIED: Chronic | Status: ACTIVE | Noted: 2020-03-17

## 2020-04-09 PROBLEM — R55 SYNCOPE AND COLLAPSE: Chronic | Status: ACTIVE | Noted: 2020-03-17

## 2020-04-09 PROBLEM — K58.9 IRRITABLE BOWEL SYNDROME WITHOUT DIARRHEA: Chronic | Status: ACTIVE | Noted: 2020-03-17

## 2020-04-09 PROBLEM — G47.30 SLEEP APNEA, UNSPECIFIED: Chronic | Status: ACTIVE | Noted: 2020-03-17

## 2020-04-09 PROBLEM — R42 DIZZINESS AND GIDDINESS: Chronic | Status: ACTIVE | Noted: 2020-03-17

## 2020-04-13 ENCOUNTER — NON-APPOINTMENT (OUTPATIENT)
Age: 30
End: 2020-04-13

## 2020-04-13 ENCOUNTER — APPOINTMENT (OUTPATIENT)
Dept: CARDIOLOGY | Facility: CLINIC | Age: 30
End: 2020-04-13
Payer: MEDICAID

## 2020-04-13 VITALS — WEIGHT: 125 LBS | SYSTOLIC BLOOD PRESSURE: 120 MMHG | DIASTOLIC BLOOD PRESSURE: 80 MMHG | BODY MASS INDEX: 22.86 KG/M2

## 2020-04-13 VITALS — OXYGEN SATURATION: 100 % | HEART RATE: 90 BPM | TEMPERATURE: 98.2 F

## 2020-04-13 DIAGNOSIS — J12.82 COVID-19: ICD-10-CM

## 2020-04-13 DIAGNOSIS — U07.1 COVID-19: ICD-10-CM

## 2020-04-13 DIAGNOSIS — I49.3 VENTRICULAR PREMATURE DEPOLARIZATION: ICD-10-CM

## 2020-04-13 DIAGNOSIS — R06.02 SHORTNESS OF BREATH: ICD-10-CM

## 2020-04-13 PROCEDURE — 99215 OFFICE O/P EST HI 40 MIN: CPT

## 2020-04-13 PROCEDURE — 93000 ELECTROCARDIOGRAM COMPLETE: CPT

## 2020-04-13 NOTE — HISTORY OF PRESENT ILLNESS
[FreeTextEntry1] : July, 2013. The patient had a positive tilt test. After about 20 minutes her heart rate went up to 150 and her blood pressure dropped. Her blood pressure then sol. Dr. Alvarez spoke to the patient on the phone on Friday. He advised her to eat more salt and drink more liquids. This morning the patient got out of bed and walk to the kitchen. On the way to the kitchen she blacked out. She still gets a heart rate of 150 with minimal activity.\par \par March 25, 2019. First return visit since above. I believe Dr. Alvarez tried pindolol at one point but unclear if she took it or if it helped. She actually has been doing fairly well with vasovagal episodes mostly just during venipuncture and her panic attacks and anxiety have been under good control. She also claims she has sleep apnea, with 13-17 episodes of apnea per night. She was told it was because she had multiple nasal fractures, most from horseback riding, but she sleeps with her bed elevated and stays on her side and does not seem to have a problem. She has a neurological issues as mentioned and is followed by Dr. Dwight Rosenstein and had seen Dr. Merrill Bowen of neurosurgery (see enclosed notes in [employer]). For some reason over the last, month she's had about 4 episodes of what she calls fainting spells. Sounds like similar to her tilt table response, and they've come on pretty much at random. Once at [employer], once while walking to a restaurant, et cetera. She has enough warning to sit down in the car or on the side of a curb and think she passes out just briefly. She is exhausted afterwards and can feel fatigued for up to 2 hours. (I did do venipuncture myself, monitored her and she seemed to be having some distress, but had strong normal pulse throughout, not tachycardic, etc.) After exam and long discussion we decided to try low-dose beta blocker.\par (Mother also very concerned because the patient's grandmother was Jessica Sotelo, who had multiple severe problems, including antiphospholipid syndrome, treated by Dr. Alvarez. The patient's sister has cystic fibrosis and there are a lot of cardiac issues throughout the family. I reassured them that the patient was too young for coronary artery disease, and that she has already had 2 or 3 normal echos with Dr. Alvarez, most recent being 2013.)\par October 10, 2019.  Patient returns in follow-up complaining of palpitations.  EKG here with sinus rhythm and unchanged and rhythm strip chest shows sinus arrhythmia.  Unclear if the bisoprolol helped her but after 1 month she stopped it because her blood pressure was in the 80s and she felt a little lightheaded and dizzy.  She said she had a same problem with pindolol in the past.  She now has what she calls a new symptom of feeling her heart racing and feeling an absence of air that can be on and off or last all day and at times is continuous.  May be one episode while working out where her heart rate was 160 according to her watch.  She claims she had no symptoms during the rhythm strip here.  She is conscious about maintaining hydration and salt load etc. Fitbit watch did capture 2 episodes one during sleep which is recorded a heart rate of 150 and once during the day of 170.  Discussed multiple options including different medication, EP study, loop recorder, and decided on a 30-day MCOT monitor first.\par November 29, 2019.  Reviewed the patient's 30-day monitor and finally had a chance to speak with her. Does have symptomatic sinus tach occasionally 150-170. Also has some VPCs that are very symptomatic in terms of the post VPC pause. After long discussion with the patient and the fact that she has not tolerated any beta-blockers I am referring her to Dr. Roberto West of EPS for further advice and management. \par December 10, 2019.  Electrophysiology consult by Dr. West. n summary, Claudia Lenz is a 28y/o woman with long standing Hx of palpitations, anxiety/panic attacks (not on medication), mild NELA (no recent sleep study and not on CPAP), syncope and near syncope, and POTS (diagnosed in 2012) who presents today for initial evaluation. Admits recurring episodes of tachycardia up to the 200s with minimal exertion, up to the 240s as per patient with exercise. Unable to tolerate activity due to feeling of near syncope or actual fainting. States her heart will race with standing and gradually return to baseline. Has utilized beta blockers in the past but was unable to tolerate secondary to hypotension. Never attempted midodrine/beta blocker combination. Also experiences dyspnea when her heart races as well as episodes of feeling her heart skip a beat. Underwent Holter monitoring which revealed episodes of sinus tachycardia to the 170s (was not exercising or exerting herself at that time) as well as episodes of isolated PVCs. Has family history of POTS (grandmother) as well as arrhythmias including PVCs (mother) and atrial fibrillation (grandmother). Has done research on POTS and follows the [facility] protocol/guidelines and attempts salt loading and increased hydration with minimal relief. No recent ECHO or stress test. EKG today NSR without evidence of PVCs. Given no recent stress test or ECHO, recommend undergoing both. Continue utilizing salt and staying well hydrated. Review of prior tilt table test revealed evidence of vasovagal syncope as well. Instructed on safety mechanisms such as staying well hydrated, utilizing salt, avoiding prolonged standing with knees locked, and to lie down with feet elevated if symptoms of near syncope occur. Can also consider use of compression socks and should avoid known triggers for syncope such as alcohol and warm or crowded environments.\par January 14, 2020.  Dr. Roberto West.  "Patient's echocardiogram showed normal LV function and Holter showed single PVCs (outflow tract type), 1912 PVCs. We discussed ablation versus antiarrhythmic medication and for now she wanted reassurance that the PVCs posed little risk, given normal LV function." \par April 13, 2020.  In the beginning of March patient had diarrhea and extreme fatigue for about a week and then after that noted initially low-grade temp that very rapidly became high temperature with shortness of breath and she went to [facility] and was treated there for a week for Covid 19 a month ago.  Chest x-ray had bilateral opacities.  She did respond well to therapy and did not need to be in an ICU or intubated.  She was released on March 16 and has been asymptomatic and afebrile since then.  Before she was released they were very concerned about her fluctuating heart rate and her occasional sinus tachycardia which he assured them was her baseline condition as it is.  She still has her usual symptoms and her palpitations have been mostly consistent with VPCs followed by compensatory pause and stronger heartbeat after the VPC.  She is able to manage her palpitations and her heart rate and is not interested in further therapy or ablation at this time.  She came in here because she has shortness of breath when her heart rate is fast which is her baseline I wanted to just make sure she was fine after having had coded 19 over 4 weeks ago.  Her EKG here is sinus rhythm at 90 with one VPC and is otherwise within normal limits.  Her O2 sat was 100% and she was afebrile

## 2020-04-13 NOTE — PHYSICAL EXAM
[General Appearance - Well Developed] : well developed [Normal Appearance] : normal appearance [Well Groomed] : well groomed [General Appearance - Well Nourished] : well nourished [No Deformities] : no deformities [General Appearance - In No Acute Distress] : no acute distress [Normal Conjunctiva] : the conjunctiva exhibited no abnormalities [Eyelids - No Xanthelasma] : the eyelids demonstrated no xanthelasmas [Normal Oral Mucosa] : normal oral mucosa [No Oral Pallor] : no oral pallor [No Oral Cyanosis] : no oral cyanosis [Normal Jugular Venous A Waves Present] : normal jugular venous A waves present [Normal Jugular Venous V Waves Present] : normal jugular venous V waves present [No Jugular Venous Chambers A Waves] : no jugular venous chambers A waves [Respiration, Rhythm And Depth] : normal respiratory rhythm and effort [Exaggerated Use Of Accessory Muscles For Inspiration] : no accessory muscle use [Auscultation Breath Sounds / Voice Sounds] : lungs were clear to auscultation bilaterally [Heart Rate And Rhythm] : heart rate and rhythm were normal [Heart Sounds] : normal S1 and S2 [Murmurs] : no murmurs present [Abdomen Soft] : soft [Abdomen Tenderness] : non-tender [Abdomen Mass (___ Cm)] : no abdominal mass palpated [Abnormal Walk] : normal gait [Gait - Sufficient For Exercise Testing] : the gait was sufficient for exercise testing [Nail Clubbing] : no clubbing of the fingernails [Cyanosis, Localized] : no localized cyanosis [Petechial Hemorrhages (___cm)] : no petechial hemorrhages [Skin Color & Pigmentation] : normal skin color and pigmentation [] : no rash [No Venous Stasis] : no venous stasis [Skin Lesions] : no skin lesions [No Skin Ulcers] : no skin ulcer [No Xanthoma] : no  xanthoma was observed [Oriented To Time, Place, And Person] : oriented to person, place, and time [Affect] : the affect was normal [Mood] : the mood was normal [FreeTextEntry1] : Somewhat anxious, but not overly

## 2020-04-13 NOTE — REVIEW OF SYSTEMS
[Fever] : no fever [Headache] : no headache [Recent Weight Gain (___ Lbs)] : no recent weight gain [Chills] : no chills [Feeling Fatigued] : not feeling fatigued [Blurry Vision] : no blurred vision [Eyeglasses] : not currently wearing eyeglasses [Earache] : no earache [Mouth Sores] : no mouth sores [Shortness Of Breath] : shortness of breath [Dyspnea on exertion] : not dyspnea during exertion [Chest  Pressure] : no chest pressure [Lower Ext Edema] : no extremity edema [Palpitations] : palpitations [Cough] : no cough [Skin: A Rash] : no rash: [see HPI] : see HPI [Dizziness] : dizziness [Convulsions] : no convulsions [Tingling (Paresthesia)] : no tingling [Depression] : no depression [Anxiety] : anxiety [Suicidal] : not suicidal [Negative] : Heme/Lymph

## 2020-04-15 DIAGNOSIS — Z71.89 OTHER SPECIFIED COUNSELING: ICD-10-CM

## 2020-06-04 ENCOUNTER — EMERGENCY (EMERGENCY)
Facility: HOSPITAL | Age: 30
LOS: 0 days | Discharge: HOME | End: 2020-06-04
Attending: EMERGENCY MEDICINE
Payer: MEDICAID

## 2020-06-04 VITALS
DIASTOLIC BLOOD PRESSURE: 80 MMHG | OXYGEN SATURATION: 99 % | RESPIRATION RATE: 18 BRPM | HEART RATE: 103 BPM | SYSTOLIC BLOOD PRESSURE: 131 MMHG | TEMPERATURE: 98 F

## 2020-06-04 VITALS — HEIGHT: 60 IN | WEIGHT: 128.09 LBS
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
## 2020-06-04 DIAGNOSIS — S09.90XA UNSPECIFIED INJURY OF HEAD, INITIAL ENCOUNTER: ICD-10-CM

## 2020-06-04 DIAGNOSIS — U07.1 COVID-19: ICD-10-CM

## 2020-06-04 DIAGNOSIS — I49.8 OTHER SPECIFIED CARDIAC ARRHYTHMIAS: ICD-10-CM

## 2020-06-04 DIAGNOSIS — F41.9 ANXIETY DISORDER, UNSPECIFIED: ICD-10-CM

## 2020-06-04 DIAGNOSIS — N80.9 ENDOMETRIOSIS, UNSPECIFIED: ICD-10-CM

## 2020-06-04 DIAGNOSIS — K21.9 GASTRO-ESOPHAGEAL REFLUX DISEASE WITHOUT ESOPHAGITIS: ICD-10-CM

## 2020-06-04 DIAGNOSIS — S00.83XA CONTUSION OF OTHER PART OF HEAD, INITIAL ENCOUNTER: ICD-10-CM

## 2020-06-04 DIAGNOSIS — G47.30 SLEEP APNEA, UNSPECIFIED: ICD-10-CM

## 2020-06-04 DIAGNOSIS — K58.9 IRRITABLE BOWEL SYNDROME WITHOUT DIARRHEA: ICD-10-CM
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
## 2020-06-04 PROCEDURE — 70450 CT HEAD/BRAIN W/O DYE: CPT

## 2020-06-04 PROCEDURE — 72125 CT NECK SPINE W/O DYE: CPT | Mod: 26

## 2020-06-04 PROCEDURE — 72125 CT NECK SPINE W/O DYE: CPT

## 2020-06-04 PROCEDURE — 99284 EMERGENCY DEPT VISIT MOD MDM: CPT | Mod: 25

## 2020-06-04 PROCEDURE — 99284 EMERGENCY DEPT VISIT MOD MDM: CPT

## 2020-06-04 PROCEDURE — 70450 CT HEAD/BRAIN W/O DYE: CPT | Mod: 26

## 2020-06-04 RX ORDER — ONDANSETRON 8 MG/1
1 TABLET, FILM COATED ORAL
Qty: 15 | Refills: 0
Start: 2020-06-04

## 2020-06-04 NOTE — ED PROVIDER NOTE - PATIENT PORTAL LINK FT
You can access the FollowMyHealth Patient Portal offered by [facility] by registering at the following website: [email or web address]. By joining [facility]’s FollowMyHealth portal, you will also be able to view your health information using other applications (apps) compatible with our system.

## 2020-06-04 NOTE — ED ADULT NURSE NOTE - CHIEF COMPLAINT QUOTE
patient s/p fall at approx 1730 yesterday after standing on the ledge of her tub to hang items on the shower curtain. patient lost balance, fell backward and hit the front and back of her head on the bathroom sink. + LOC, unknown amount of time unconscious. denies blood thinners. patient states she fell asleep and endorses 2 episodes of vomiting this morning when she woke up. patient tested positive for covid in march and spent a week in . patient axox4, ambulatory to ED. patient evaluated by md bundy, patient taken straight to CT scan.

## 2020-06-04 NOTE — ED PROVIDER NOTE - CARE PROVIDER_API CALL
Aj Denton  NEUROSURGERY  [address] [place] [address]  Phone: [phone]  Fax: [phone]  Follow Up Time:

## 2020-06-04 NOTE — ED ADULT NURSE NOTE - NSIMPLEMENTINTERV_GEN_ALL_ED
Implemented All Fall Risk Interventions:  [place] to call system. Call bell, personal items and telephone within reach. Instruct patient to call for assistance. Room bathroom lighting operational. Non-slip footwear when patient is off stretcher. Physically safe environment: no spills, clutter or unnecessary equipment. Stretcher in lowest position, wheels locked, appropriate side rails in place. Provide visual cue, wrist band, yellow gown, etc. Monitor gait and stability. Monitor for mental status changes and reorient to person, place, and time. Review medications for side effects contributing to fall risk. Reinforce activity limits and safety measures with patient and family.

## 2020-06-04 NOTE — ED PROVIDER NOTE - NSFOLLOWUPINSTRUCTIONS_ED_ALL_ED_FT
Please call and follow up with your doctor in 1-3 days.  Use Tylenol (acetaminophen) 1000mg every 6 hours and Motrin (Ibuprofen) 600 mg every 6 hours as need for fever/pain.        Head Injury    WHAT YOU NEED TO KNOW:    A head injury is most often caused by a blow to the head. This may occur from a fall, bicycle injury, sports injury, being struck in the head, or a motor vehicle accident.     DISCHARGE INSTRUCTIONS:    Call 911 or have someone else call for any of the following:     You cannot be woken.      You have a seizure.      You stop responding to others or you faint.      You have blurry or double vision.      Your speech becomes slurred or confused.      You have arm or leg weakness, loss of feeling, or new problems with coordination.      Your pupils are larger than usual or one pupil is a different size than the other.       You have blood or clear fluid coming out of your ears or nose.    Return to the emergency department if:     You have repeated or forceful vomiting.      You feel confused.      Your headache gets worse or becomes severe.      You or someone caring for you notices that you are harder to wake than usual.    Contact your healthcare provider if:     Your symptoms last longer than 6 weeks after the injury.      You have questions or concerns about your condition or care.    Medicines:     Acetaminophen decreases pain. Acetaminophen is available without a doctor's order. Ask how much to take and how often to take it. Follow directions. Acetaminophen can cause liver damage if not taken correctly.      Take your medicine as directed. Contact your healthcare provider if you think your medicine is not helping or if you have side effects. Tell him or her if you are allergic to any medicine. Keep a list of the medicines, vitamins, and herbs you take. Include the amounts, and when and why you take them. Bring the list or the pill bottles to follow-up visits. Carry your medicine list with you in case of an emergency.    Self-care:     Rest or do quiet activities for 24 to 48 hours. Limit your time watching TV, using the computer, or doing tasks that require a lot of thinking. Slowly return to your normal activities as directed. Do not play sports or do activities that may cause you to get hit in the head. Ask your healthcare provider when you can return to sports.       Apply ice on your head for 15 to 20 minutes every hour or as directed. Use an ice pack, or put crushed ice in a plastic bag. Cover it with a towel before you apply it to your skin. Ice helps prevent tissue damage and decreases swelling and pain.       Have someone stay with you for 24 hours or as directed. This person can monitor you for complications and call 911. When you are awake the person should ask you a few questions to see if you are thinking clearly. An example would be to ask your name or your address.     Prevent another head injury:     Wear a helmet that fits properly. Do this when you play sports, or ride a bike, scooter, or skateboard. Helmets help decrease your risk of a serious head injury. Talk to your healthcare provider about other ways you can protect yourself if you play sports.      Wear your seat belt every time you are in a car. This helps to decrease your risk for a head injury if you are in a car accident.     Follow up with your healthcare provider as directed: Write down your questions so you remember to ask them during your visits. Please call and follow up with your doctor in 1-3 days.  Please call and follow up with Dr. Denton for concussion clinic.  Please call [phone] to find doctors in [facility] - like pulmonologist    Use Tylenol (acetaminophen) 1000mg every 6 hours and Motrin (Ibuprofen) 600 mg every 6 hours as need for fever/pain.        Head Injury    WHAT YOU NEED TO KNOW:    A head injury is most often caused by a blow to the head. This may occur from a fall, bicycle injury, sports injury, being struck in the head, or a motor vehicle accident.     DISCHARGE INSTRUCTIONS:    Call 911 or have someone else call for any of the following:     You cannot be woken.      You have a seizure.      You stop responding to others or you faint.      You have blurry or double vision.      Your speech becomes slurred or confused.      You have arm or leg weakness, loss of feeling, or new problems with coordination.      Your pupils are larger than usual or one pupil is a different size than the other.       You have blood or clear fluid coming out of your ears or nose.    Return to the emergency department if:     You have repeated or forceful vomiting.      You feel confused.      Your headache gets worse or becomes severe.      You or someone caring for you notices that you are harder to wake than usual.    Contact your healthcare provider if:     Your symptoms last longer than 6 weeks after the injury.      You have questions or concerns about your condition or care.    Medicines:     Acetaminophen decreases pain. Acetaminophen is available without a doctor's order. Ask how much to take and how often to take it. Follow directions. Acetaminophen can cause liver damage if not taken correctly.      Take your medicine as directed. Contact your healthcare provider if you think your medicine is not helping or if you have side effects. Tell him or her if you are allergic to any medicine. Keep a list of the medicines, vitamins, and herbs you take. Include the amounts, and when and why you take them. Bring the list or the pill bottles to follow-up visits. Carry your medicine list with you in case of an emergency.    Self-care:     Rest or do quiet activities for 24 to 48 hours. Limit your time watching TV, using the computer, or doing tasks that require a lot of thinking. Slowly return to your normal activities as directed. Do not play sports or do activities that may cause you to get hit in the head. Ask your healthcare provider when you can return to sports.       Apply ice on your head for 15 to 20 minutes every hour or as directed. Use an ice pack, or put crushed ice in a plastic bag. Cover it with a towel before you apply it to your skin. Ice helps prevent tissue damage and decreases swelling and pain.       Have someone stay with you for 24 hours or as directed. This person can monitor you for complications and call 911. When you are awake the person should ask you a few questions to see if you are thinking clearly. An example would be to ask your name or your address.     Prevent another head injury:     Wear a helmet that fits properly. Do this when you play sports, or ride a bike, scooter, or skateboard. Helmets help decrease your risk of a serious head injury. Talk to your healthcare provider about other ways you can protect yourself if you play sports.      Wear your seat belt every time you are in a car. This helps to decrease your risk for a head injury if you are in a car accident.     Follow up with your healthcare provider as directed: Write down your questions so you remember to ask them during your visits.

## 2020-06-04 NOTE — ED PROVIDER NOTE - PROGRESS NOTE DETAILS
Attending Mcbride, reviewed with pt results of tests.  pt in NAD.  Plan d/c and follow up with concussion clinic.

## 2020-06-04 NOTE — ED ADULT TRIAGE NOTE - CHIEF COMPLAINT QUOTE
patient s/p fall at approx 1730 yesterday after standing on the ledge of her tub to hang items on the shower curtain. patient lost balance, fell backward and hit the front and back of her head on the bathroom sink. + LOC, unknown amount of time unconscious. denies blood thinners. patient states she fell asleep and endorses 2 episodes of vomiting this morning when she woke up. patient tested positive for covid in march and spent a week in . patient s/p fall at approx 1730 yesterday after standing on the ledge of her tub to hang items on the shower curtain. patient lost balance, fell backward and hit the front and back of her head on the bathroom sink. + LOC, unknown amount of time unconscious. denies blood thinners. patient states she fell asleep and endorses 2 episodes of vomiting this morning when she woke up. patient tested positive for covid in march and spent a week in . patient evaluated by md bundy, patient taken straight to CT scan. patient s/p fall at approx 1730 yesterday after standing on the ledge of her tub to hang items on the shower curtain. patient lost balance, fell backward and hit the front and back of her head on the bathroom sink. + LOC, unknown amount of time unconscious. denies blood thinners. patient states she fell asleep and endorses 2 episodes of vomiting this morning when she woke up. patient tested positive for covid in march and spent a week in . patient axox4, ambulatory to ED. patient evaluated by md bundy, patient taken straight to CT scan.

## 2020-06-04 NOTE — ED PROVIDER NOTE - OBJECTIVE STATEMENT
31 yo pt presents to ED for head injury.  Pt was hanging up bikini on shower bar yesterday at 5:30pm.  Bar broke, hi her in forehead and pt fell down.  This AM, pt woke up and feels nauseous and has pain behind right ear.  Pt concerned that she fell and hit sink and does not recall injury.  No travel, no sick contact.  + covid19 pneumonia in March 2020. 29 yo pt presents to ED for head injury.  Pt was hanging up bikini on shower bar yesterday at 5:30pm.  Bar broke, hit her in forehead and pt fell down.  Pt felt tired after incident and went to sleep at 7pm.  This AM, pt woke up and feels nauseous and has pain behind right ear.  Pt concerned that she fell and may have hit sink and does not recall doing so.  No travel, no sick contact.  + covid19 pneumonia in March 2020.

## 2020-06-04 NOTE — ED PROVIDER NOTE - MUSCULOSKELETAL, MLM
Spine appears normal, range of motion is not limited, no muscle or joint tenderness.  Tender to palpation behind right ear, no redness, no bleeding, no bruising.

## 2020-07-15 ENCOUNTER — APPOINTMENT (OUTPATIENT)
Dept: INTERNAL MEDICINE | Facility: CLINIC | Age: 30
End: 2020-07-15
Payer: MEDICAID

## 2020-07-15 VITALS
DIASTOLIC BLOOD PRESSURE: 70 MMHG | TEMPERATURE: 99 F | RESPIRATION RATE: 16 BRPM | WEIGHT: 130 LBS | HEART RATE: 92 BPM | HEIGHT: 62 IN | SYSTOLIC BLOOD PRESSURE: 108 MMHG | OXYGEN SATURATION: 98 % | BODY MASS INDEX: 23.92 KG/M2

## 2020-07-15 DIAGNOSIS — Z11.59 ENCOUNTER FOR SCREENING FOR OTHER VIRAL DISEASES: ICD-10-CM

## 2020-07-15 DIAGNOSIS — Z72.89 OTHER PROBLEMS RELATED TO LIFESTYLE: ICD-10-CM

## 2020-07-15 PROCEDURE — 99205 OFFICE O/P NEW HI 60 MIN: CPT

## 2020-07-15 NOTE — PHYSICAL EXAM
[No Acute Distress] : no acute distress [Normal Oropharynx] : normal oropharynx [Normal Appearance] : normal appearance [Normal S1, S2] : normal s1, s2 [Normal Rate/Rhythm] : normal rate/rhythm [No Neck Mass] : no neck mass [No Murmurs] : no murmurs [Clear to Auscultation Bilaterally] : clear to auscultation bilaterally [No Resp Distress] : no resp distress [No Abnormalities] : no abnormalities [Normal Gait] : normal gait [Benign] : benign [No Clubbing] : no clubbing [No Cyanosis] : no cyanosis [No Edema] : no edema [FROM] : FROM [No Focal Deficits] : no focal deficits [Normal Color/ Pigmentation] : normal color/ pigmentation [Oriented x3] : oriented x3 [Normal Affect] : normal affect

## 2020-07-21 ENCOUNTER — APPOINTMENT (OUTPATIENT)
Dept: INTERNAL MEDICINE | Facility: CLINIC | Age: 30
End: 2020-07-21

## 2020-07-27 ENCOUNTER — APPOINTMENT (OUTPATIENT)
Dept: INTERNAL MEDICINE | Facility: CLINIC | Age: 30
End: 2020-07-27
Payer: MEDICAID

## 2020-07-27 VITALS
HEIGHT: 60 IN | BODY MASS INDEX: 25.13 KG/M2 | OXYGEN SATURATION: 97 % | SYSTOLIC BLOOD PRESSURE: 100 MMHG | TEMPERATURE: 99 F | RESPIRATION RATE: 18 BRPM | WEIGHT: 128 LBS | HEART RATE: 97 BPM | DIASTOLIC BLOOD PRESSURE: 68 MMHG

## 2020-07-27 LAB — SARS-COV-2 N GENE NPH QL NAA+PROBE: NOT DETECTED

## 2020-07-27 PROCEDURE — 94727 GAS DIL/WSHOT DETER LNG VOL: CPT

## 2020-07-27 PROCEDURE — 94060 EVALUATION OF WHEEZING: CPT

## 2020-07-27 PROCEDURE — 94729 DIFFUSING CAPACITY: CPT

## 2020-08-03 LAB — SARS-COV-2 N GENE NPH QL NAA+PROBE: NOT DETECTED

## 2020-08-10 NOTE — ASSESSMENT
[FreeTextEntry1] : Ms. Lenz presents for initial pulmonary evaluation complaining of persistent episodes of chest tightness and shortness of breath. She was diagnosed with rotavirus pneumonia on 3/17/20. She had almost complete resolution of bilateral infiltrates on chest x-ray of 3/19/20. O2 saturation is within normal limits. Patient has no previous history of asthma seasonal allergic rhinitis. Ms. Lenz will be scheduled for complete pulmonary function tests. She does not require supplemental oxygen we'll metered-dose inhaler therapy at present. The patient will followup in 6 weeks' time.

## 2020-08-10 NOTE — HISTORY OF PRESENT ILLNESS
[TextBox_4] : Ms. Lenz is a 30-year-old female who presents for initial pulmonary evaluation. She presented to [facility] emergency room on 3/17/20 with fever to one and 4°, cough and shortness of breath for 3 or 4 days. Chest x-ray showed bilateral lower lobe infiltrates. Patient was positive for rotavirus infection. Ms. Lenz was treated with Zithromax and Ceftin. There was significant resolution of infiltrates on chest x-ray of 7/19. The patient was discharged home on cefuroxime 250 mg b.i.d. for 7 days. Since that time she has had persistent episodes of shortness of breath with exertion. Activities and normally would not cause significant dyspnea continue to cause symptoms. Ms. Lenz continues to have symptoms of chest tightness. There is no previous history of asthma seasonal allergic rhinitis. She is a lifelong nonsmoker. Patient is a carrier for the cystic fibrosis gene. Her sister has symptomatic cystic fibrosis.

## 2020-09-30 ENCOUNTER — NON-APPOINTMENT (OUTPATIENT)
Age: 30
End: 2020-09-30

## 2020-10-06 ENCOUNTER — APPOINTMENT (OUTPATIENT)
Dept: CARDIOLOGY | Facility: CLINIC | Age: 30
End: 2020-10-06
Payer: MEDICAID

## 2020-10-06 ENCOUNTER — NON-APPOINTMENT (OUTPATIENT)
Age: 30
End: 2020-10-06

## 2020-10-06 VITALS
SYSTOLIC BLOOD PRESSURE: 138 MMHG | WEIGHT: 134 LBS | HEART RATE: 93 BPM | OXYGEN SATURATION: 100 % | DIASTOLIC BLOOD PRESSURE: 85 MMHG | BODY MASS INDEX: 26.17 KG/M2 | TEMPERATURE: 97.9 F

## 2020-10-06 VITALS — HEART RATE: 94 BPM | DIASTOLIC BLOOD PRESSURE: 82 MMHG | SYSTOLIC BLOOD PRESSURE: 132 MMHG

## 2020-10-06 DIAGNOSIS — R94.09 ABNORMAL RESULTS OF OTHER FUNCTION STUDIES OF CENTRAL NERVOUS SYSTEM: ICD-10-CM

## 2020-10-06 DIAGNOSIS — R73.09 OTHER ABNORMAL GLUCOSE: ICD-10-CM

## 2020-10-06 PROCEDURE — 36415 COLL VENOUS BLD VENIPUNCTURE: CPT

## 2020-10-06 PROCEDURE — 93000 ELECTROCARDIOGRAM COMPLETE: CPT

## 2020-10-06 PROCEDURE — 99214 OFFICE O/P EST MOD 30 MIN: CPT

## 2020-10-06 NOTE — PHYSICAL EXAM
[General Appearance - Well Developed] : well developed [Normal Appearance] : normal appearance [Well Groomed] : well groomed [General Appearance - Well Nourished] : well nourished [No Deformities] : no deformities [General Appearance - In No Acute Distress] : no acute distress [Normal Conjunctiva] : the conjunctiva exhibited no abnormalities [Eyelids - No Xanthelasma] : the eyelids demonstrated no xanthelasmas [Normal Oral Mucosa] : normal oral mucosa [No Oral Pallor] : no oral pallor [No Oral Cyanosis] : no oral cyanosis [Normal Jugular Venous A Waves Present] : normal jugular venous A waves present [Normal Jugular Venous V Waves Present] : normal jugular venous V waves present [No Jugular Venous Chambers A Waves] : no jugular venous chambers A waves [Respiration, Rhythm And Depth] : normal respiratory rhythm and effort [Exaggerated Use Of Accessory Muscles For Inspiration] : no accessory muscle use [Auscultation Breath Sounds / Voice Sounds] : lungs were clear to auscultation bilaterally [Heart Rate And Rhythm] : heart rate and rhythm were normal [Heart Sounds] : normal S1 and S2 [Murmurs] : no murmurs present [Abdomen Soft] : soft [Abdomen Tenderness] : non-tender [Abdomen Mass (___ Cm)] : no abdominal mass palpated [Abnormal Walk] : normal gait [Gait - Sufficient For Exercise Testing] : the gait was sufficient for exercise testing [Nail Clubbing] : no clubbing of the fingernails [Cyanosis, Localized] : no localized cyanosis [Petechial Hemorrhages (___cm)] : no petechial hemorrhages [Skin Color & Pigmentation] : normal skin color and pigmentation [] : no rash [No Venous Stasis] : no venous stasis [Skin Lesions] : no skin lesions [No Skin Ulcers] : no skin ulcer [No Xanthoma] : no  xanthoma was observed [Oriented To Time, Place, And Person] : oriented to person, place, and time [Affect] : the affect was normal [Mood] : the mood was normal [FreeTextEntry1] : Somewhat anxious more than recent

## 2020-10-06 NOTE — HISTORY OF PRESENT ILLNESS
[FreeTextEntry1] : July, 2013. The patient had a positive tilt test. After about 20 minutes her heart rate went up to 150 and her blood pressure dropped. Her blood pressure then sol. Dr. Alvarez spoke to the patient on the phone on Friday. He advised her to eat more salt and drink more liquids. This morning the patient got out of bed and walk to the kitchen. On the way to the kitchen she blacked out. She still gets a heart rate of 150 with minimal activity.\par \par March 25, 2019. First return visit since above. I believe Dr. Alvarez tried pindolol at one point but unclear if she took it or if it helped. She actually has been doing fairly well with vasovagal episodes mostly just during venipuncture and her panic attacks and anxiety have been under good control. She also claims she has sleep apnea, with 13-17 episodes of apnea per night. She was told it was because she had multiple nasal fractures, most from horseback riding, but she sleeps with her bed elevated and stays on her side and does not seem to have a problem. She has a neurological issues as mentioned and is followed by Dr. Dwight Rosenstein and had seen Dr. Merrill Bowen of neurosurgery (see enclosed notes in [employer]). For some reason over the last, month she's had about 4 episodes of what she calls fainting spells. Sounds like similar to her tilt table response, and they've come on pretty much at random. Once at [employer], once while walking to a restaurant, et cetera. She has enough warning to sit down in the car or on the side of a curb and think she passes out just briefly. She is exhausted afterwards and can feel fatigued for up to 2 hours. (I did do venipuncture myself, monitored her and she seemed to be having some distress, but had strong normal pulse throughout, not tachycardic, etc.) After exam and long discussion we decided to try low-dose beta blocker.\par (Mother also very concerned because the patient's grandmother was Jessica Sotelo, who had multiple severe problems, including antiphospholipid syndrome, treated by Dr. Alvarez. The patient's sister has cystic fibrosis and there are a lot of cardiac issues throughout the family. I reassured them that the patient was too young for coronary artery disease, and that she has already had 2 or 3 normal echos with Dr. Alvarez, most recent being 2013.)\par October 10, 2019.  Patient returns in follow-up complaining of palpitations.  EKG here with sinus rhythm and unchanged and rhythm strip chest shows sinus arrhythmia.  Unclear if the bisoprolol helped her but after 1 month she stopped it because her blood pressure was in the 80s and she felt a little lightheaded and dizzy.  She said she had a same problem with pindolol in the past.  She now has what she calls a new symptom of feeling her heart racing and feeling an absence of air that can be on and off or last all day and at times is continuous.  May be one episode while working out where her heart rate was 160 according to her watch.  She claims she had no symptoms during the rhythm strip here.  She is conscious about maintaining hydration and salt load etc. Fitbit watch did capture 2 episodes one during sleep which is recorded a heart rate of 150 and once during the day of 170.  Discussed multiple options including different medication, EP study, loop recorder, and decided on a 30-day MCOT monitor first.\par November 29, 2019.  Reviewed the patient's 30-day monitor and finally had a chance to speak with her. Does have symptomatic sinus tach occasionally 150-170. Also has some VPCs that are very symptomatic in terms of the post VPC pause. After long discussion with the patient and the fact that she has not tolerated any beta-blockers I am referring her to Dr. Roberto West of EPS for further advice and management. \par December 10, 2019.  Electrophysiology consult by Dr. West. 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 Admits recurring episodes of tachycardia up to the 200s with minimal exertion, up to the 240s as per patient with exercise. Unable to tolerate activity due to feeling of near syncope or actual fainting. States her heart will race with standing and gradually return to baseline. Has utilized beta blockers in the past but was unable to tolerate secondary to hypotension. Never attempted midodrine/beta blocker combination. Also experiences dyspnea when her heart races as well as episodes of feeling her heart skip a beat. Underwent Holter monitoring which revealed episodes of sinus tachycardia to the 170s (was not exercising or exerting herself at that time) as well as episodes of isolated PVCs. Has family history of POTS (grandmother) as well as arrhythmias including PVCs (mother) and atrial fibrillation (grandmother). Has done research on POTS and follows the [facility] protocol/guidelines and attempts salt loading and increased hydration with minimal relief. No recent ECHO or stress test. EKG today NSR without evidence of PVCs. Given no recent stress test or ECHO, recommend undergoing both. Continue utilizing salt and staying well hydrated. Review of prior tilt table test revealed evidence of vasovagal syncope as well. Instructed on safety mechanisms such as staying well hydrated, utilizing salt, avoiding prolonged standing with knees locked, and to lie down with feet elevated if symptoms of near syncope occur. Can also consider use of compression socks and should avoid known triggers for syncope such as alcohol and warm or crowded environments.\par January 14, 2020.  Dr. Roberto West.  "Patient's echocardiogram showed normal LV function and Holter showed single PVCs (outflow tract type), 1912 PVCs. We discussed ablation versus antiarrhythmic medication and for now she wanted reassurance that the PVCs posed little risk, given normal LV function." \par April 13, 2020.  In the beginning of March patient had diarrhea and extreme fatigue for about a week and then after that noted initially low-grade temp that very rapidly became high temperature with shortness of breath and she went to [facility] and was treated there for a week for Covid 19 a month ago.  Chest x-ray had bilateral opacities.  She did respond well to therapy and did not need to be in an ICU or intubated.  She was released on March 20 and has been asymptomatic and afebrile since then.  Before she was released they were very concerned about her fluctuating heart rate and her occasional sinus tachycardia which she assured them was her baseline condition as it is.  She still has her usual symptoms and her palpitations have been mostly consistent with VPCs followed by compensatory pause and stronger heartbeat after the VPC.  She is able to manage her palpitations and her heart rate and is not interested in further therapy or ablation at this time.  She came in here because she has shortness of breath when her heart rate is fast which is her baseline I wanted to just make sure she was fine after having had coded 19 over 4 weeks ago.  Her EKG here is sinus rhythm at 90 with one VPC and is otherwise within normal limits.  Her O2 sat was 100% and she was afebrile\par October 6, 2020.  It is now almost 7 months since she had her episode of COVID and seemed to have fully recovered.  Sudden stress with her father finding out he had severe LAD disease and having an acute angioplasty followed by an attempt at ablation for atrial fibrillation that caused a cardiac arrest.  Mother went for a coronary calcium score and was found to be mildly abnormal but also in the proximal LAD.  Now patient with severe fatigue shortness of breath anxiety and is convinced that this is all from post COVID lung or heart disease.  Claims to have remained severely fatigued since she was diagnosed etc.  My initial feeling was to have her hooked up with Dr. Charisse Santos years post COVID program for closer follow-up and monitoring although I now feel that her symptoms are probably not COVID related.  We will have her come back for an echocardiogram just to be sure and labs were sent.

## 2020-10-06 NOTE — REASON FOR VISIT
[FreeTextEntry1] : The patient's a 30-year-old woman who presented 20 years ago with a history of tachycardia and feeling lightheaded. Has positive tilt table, vasovagal episodes, panic attacks, anxiety, sleep apnea, abnormal EEG, pineal gland tumor. Now s/p Covid 19 pneumonia 3/14-3/20.

## 2020-10-06 NOTE — REVIEW OF SYSTEMS
[Fever] : no fever [Headache] : no headache [Recent Weight Gain (___ Lbs)] : recent [unfilled] ~Ulb weight gain [Chills] : no chills [Feeling Fatigued] : not feeling fatigued [Blurry Vision] : no blurred vision [Eyeglasses] : not currently wearing eyeglasses [Earache] : no earache [Mouth Sores] : no mouth sores [Shortness Of Breath] : shortness of breath [Dyspnea on exertion] : dyspnea during exertion [Chest  Pressure] : no chest pressure [Chest Pain] : chest pain [Lower Ext Edema] : no extremity edema [Palpitations] : palpitations [Cough] : no cough [Wheezing] : no wheezing [Coughing Up Blood] : no hemoptysis [Skin: A Rash] : no rash: [see HPI] : see HPI [Dizziness] : dizziness [Convulsions] : no convulsions [Tingling (Paresthesia)] : no tingling [Depression] : no depression [Anxiety] : anxiety [Suicidal] : not suicidal [Negative] : Heme/Lymph

## 2020-10-07 LAB
ALBUMIN SERPL ELPH-MCNC: 4.6 G/DL
ALP BLD-CCNC: 44 U/L
ALT SERPL-CCNC: 13 U/L
ANION GAP SERPL CALC-SCNC: 14 MMOL/L
AST SERPL-CCNC: 19 U/L
BASOPHILS # BLD AUTO: 0.05 K/UL
BASOPHILS NFR BLD AUTO: 0.7 %
BILIRUB SERPL-MCNC: 0.2 MG/DL
BUN SERPL-MCNC: 9 MG/DL
CALCIUM SERPL-MCNC: 9.6 MG/DL
CHLORIDE SERPL-SCNC: 102 MMOL/L
CHOLEST SERPL-MCNC: 138 MG/DL
CHOLEST/HDLC SERPL: 1.4 RATIO
CO2 SERPL-SCNC: 22 MMOL/L
CREAT SERPL-MCNC: 0.62 MG/DL
EOSINOPHIL # BLD AUTO: 0.11 K/UL
EOSINOPHIL NFR BLD AUTO: 1.5 %
ESTIMATED AVERAGE GLUCOSE: 91 MG/DL
GLUCOSE SERPL-MCNC: 91 MG/DL
HBA1C MFR BLD HPLC: 4.8 %
HCT VFR BLD CALC: 39.1 %
HDLC SERPL-MCNC: 95 MG/DL
HGB BLD-MCNC: 12.6 G/DL
IMM GRANULOCYTES NFR BLD AUTO: 0.3 %
LDLC SERPL CALC-MCNC: 31 MG/DL
LYMPHOCYTES # BLD AUTO: 2.33 K/UL
LYMPHOCYTES NFR BLD AUTO: 31.9 %
MAN DIFF?: NORMAL
MCHC RBC-ENTMCNC: 30.7 PG
MCHC RBC-ENTMCNC: 32.2 GM/DL
MCV RBC AUTO: 95.1 FL
MONOCYTES # BLD AUTO: 0.57 K/UL
MONOCYTES NFR BLD AUTO: 7.8 %
NEUTROPHILS # BLD AUTO: 4.23 K/UL
NEUTROPHILS NFR BLD AUTO: 57.8 %
NT-PROBNP SERPL-MCNC: 79 PG/ML
PLATELET # BLD AUTO: 321 K/UL
POTASSIUM SERPL-SCNC: 4.2 MMOL/L
PROT SERPL-MCNC: 6.9 G/DL
RBC # BLD: 4.11 M/UL
RBC # FLD: 12.3 %
SODIUM SERPL-SCNC: 139 MMOL/L
TRIGL SERPL-MCNC: 56 MG/DL
TSH SERPL-ACNC: 3.72 UIU/ML
WBC # FLD AUTO: 7.31 K/UL

## 2020-10-13 ENCOUNTER — EMERGENCY (EMERGENCY)
Facility: HOSPITAL | Age: 30
LOS: 0 days | Discharge: HOME | End: 2020-10-13
Attending: EMERGENCY MEDICINE
Payer: MEDICAID

## 2020-10-13 VITALS
RESPIRATION RATE: 18 BRPM | OXYGEN SATURATION: 100 % | DIASTOLIC BLOOD PRESSURE: 84 MMHG | SYSTOLIC BLOOD PRESSURE: 124 MMHG | HEART RATE: 85 BPM

## 2020-10-13 VITALS — HEIGHT: 60 IN | WEIGHT: 134.92 LBS

## 2020-10-13 DIAGNOSIS — R10.12 LEFT UPPER QUADRANT PAIN: ICD-10-CM

## 2020-10-13 DIAGNOSIS — R06.02 SHORTNESS OF BREATH: ICD-10-CM

## 2020-10-13 DIAGNOSIS — K58.9 IRRITABLE BOWEL SYNDROME WITHOUT DIARRHEA: ICD-10-CM

## 2020-10-13 DIAGNOSIS — R11.2 NAUSEA WITH VOMITING, UNSPECIFIED: ICD-10-CM

## 2020-10-13 DIAGNOSIS — K21.9 GASTRO-ESOPHAGEAL REFLUX DISEASE WITHOUT ESOPHAGITIS: ICD-10-CM

## 2020-10-13 DIAGNOSIS — Z88.5 ALLERGY STATUS TO NARCOTIC AGENT: ICD-10-CM

## 2020-10-13 DIAGNOSIS — Z88.2 ALLERGY STATUS TO SULFONAMIDES: ICD-10-CM

## 2020-10-13 DIAGNOSIS — Z86.19 PERSONAL HISTORY OF OTHER INFECTIOUS AND PARASITIC DISEASES: ICD-10-CM

## 2020-10-13 DIAGNOSIS — I49.8 OTHER SPECIFIED CARDIAC ARRHYTHMIAS: ICD-10-CM

## 2020-10-13 LAB
ALBUMIN SERPL ELPH-MCNC: 3.9 G/DL — SIGNIFICANT CHANGE UP (ref 3.3–5)
ALP SERPL-CCNC: 48 U/L — SIGNIFICANT CHANGE UP (ref 40–120)
ALT FLD-CCNC: 19 U/L — SIGNIFICANT CHANGE UP (ref 12–78)
ANION GAP SERPL CALC-SCNC: 8 MMOL/L — SIGNIFICANT CHANGE UP (ref 5–17)
AST SERPL-CCNC: 15 U/L — SIGNIFICANT CHANGE UP (ref 15–37)
BASOPHILS # BLD AUTO: 0.05 K/UL — SIGNIFICANT CHANGE UP (ref 0–0.2)
BASOPHILS NFR BLD AUTO: 0.5 % — SIGNIFICANT CHANGE UP (ref 0–2)
BILIRUB SERPL-MCNC: 0.5 MG/DL — SIGNIFICANT CHANGE UP (ref 0.2–1.2)
BUN SERPL-MCNC: 9 MG/DL — SIGNIFICANT CHANGE UP (ref 7–23)
CALCIUM SERPL-MCNC: 9.1 MG/DL — SIGNIFICANT CHANGE UP (ref 8.5–10.1)
CHLORIDE SERPL-SCNC: 107 MMOL/L — SIGNIFICANT CHANGE UP (ref 96–108)
CO2 SERPL-SCNC: 22 MMOL/L — SIGNIFICANT CHANGE UP (ref 22–31)
CREAT SERPL-MCNC: 0.66 MG/DL — SIGNIFICANT CHANGE UP (ref 0.5–1.3)
EOSINOPHIL # BLD AUTO: 0.02 K/UL — SIGNIFICANT CHANGE UP (ref 0–0.5)
EOSINOPHIL NFR BLD AUTO: 0.2 % — SIGNIFICANT CHANGE UP (ref 0–6)
GLUCOSE SERPL-MCNC: 77 MG/DL — SIGNIFICANT CHANGE UP (ref 70–99)
HCG SERPL-ACNC: <1 MIU/ML — SIGNIFICANT CHANGE UP
HCT VFR BLD CALC: 38.3 % — SIGNIFICANT CHANGE UP (ref 34.5–45)
HGB BLD-MCNC: 12.8 G/DL — SIGNIFICANT CHANGE UP (ref 11.5–15.5)
IMM GRANULOCYTES NFR BLD AUTO: 0.2 % — SIGNIFICANT CHANGE UP (ref 0–1.5)
LYMPHOCYTES # BLD AUTO: 1.96 K/UL — SIGNIFICANT CHANGE UP (ref 1–3.3)
LYMPHOCYTES # BLD AUTO: 21.5 % — SIGNIFICANT CHANGE UP (ref 13–44)
MCHC RBC-ENTMCNC: 30.9 PG — SIGNIFICANT CHANGE UP (ref 27–34)
MCHC RBC-ENTMCNC: 33.4 GM/DL — SIGNIFICANT CHANGE UP (ref 32–36)
MCV RBC AUTO: 92.5 FL — SIGNIFICANT CHANGE UP (ref 80–100)
MONOCYTES # BLD AUTO: 0.45 K/UL — SIGNIFICANT CHANGE UP (ref 0–0.9)
MONOCYTES NFR BLD AUTO: 4.9 % — SIGNIFICANT CHANGE UP (ref 2–14)
NEUTROPHILS # BLD AUTO: 6.63 K/UL — SIGNIFICANT CHANGE UP (ref 1.8–7.4)
NEUTROPHILS NFR BLD AUTO: 72.7 % — SIGNIFICANT CHANGE UP (ref 43–77)
NT-PROBNP SERPL-SCNC: 58 PG/ML — SIGNIFICANT CHANGE UP (ref 0–125)
PLATELET # BLD AUTO: 338 K/UL — SIGNIFICANT CHANGE UP (ref 150–400)
POTASSIUM SERPL-MCNC: 3.9 MMOL/L — SIGNIFICANT CHANGE UP (ref 3.5–5.3)
POTASSIUM SERPL-SCNC: 3.9 MMOL/L — SIGNIFICANT CHANGE UP (ref 3.5–5.3)
PROT SERPL-MCNC: 7.8 GM/DL — SIGNIFICANT CHANGE UP (ref 6–8.3)
RBC # BLD: 4.14 M/UL — SIGNIFICANT CHANGE UP (ref 3.8–5.2)
RBC # FLD: 12.1 % — SIGNIFICANT CHANGE UP (ref 10.3–14.5)
SODIUM SERPL-SCNC: 137 MMOL/L — SIGNIFICANT CHANGE UP (ref 135–145)
TROPONIN I SERPL-MCNC: <0.015 NG/ML — SIGNIFICANT CHANGE UP (ref 0.01–0.04)
WBC # BLD: 9.13 K/UL — SIGNIFICANT CHANGE UP (ref 3.8–10.5)
WBC # FLD AUTO: 9.13 K/UL — SIGNIFICANT CHANGE UP (ref 3.8–10.5)

## 2020-10-13 PROCEDURE — 99284 EMERGENCY DEPT VISIT MOD MDM: CPT | Mod: 25

## 2020-10-13 PROCEDURE — 83880 ASSAY OF NATRIURETIC PEPTIDE: CPT

## 2020-10-13 PROCEDURE — 85025 COMPLETE CBC W/AUTO DIFF WBC: CPT

## 2020-10-13 PROCEDURE — 71275 CT ANGIOGRAPHY CHEST: CPT

## 2020-10-13 PROCEDURE — 99285 EMERGENCY DEPT VISIT HI MDM: CPT

## 2020-10-13 PROCEDURE — 80053 COMPREHEN METABOLIC PANEL: CPT

## 2020-10-13 PROCEDURE — 93005 ELECTROCARDIOGRAM TRACING: CPT

## 2020-10-13 PROCEDURE — 71275 CT ANGIOGRAPHY CHEST: CPT | Mod: 26

## 2020-10-13 PROCEDURE — 93010 ELECTROCARDIOGRAM REPORT: CPT

## 2020-10-13 PROCEDURE — 74177 CT ABD & PELVIS W/CONTRAST: CPT

## 2020-10-13 PROCEDURE — 74177 CT ABD & PELVIS W/CONTRAST: CPT | Mod: 26

## 2020-10-13 PROCEDURE — 36415 COLL VENOUS BLD VENIPUNCTURE: CPT

## 2020-10-13 PROCEDURE — 84702 CHORIONIC GONADOTROPIN TEST: CPT

## 2020-10-13 PROCEDURE — 84484 ASSAY OF TROPONIN QUANT: CPT

## 2020-10-13 NOTE — ED ADULT TRIAGE NOTE - CHIEF COMPLAINT QUOTE
Patient presents to ED complaining of abdominal pain x 2 weeks. Pt followed up and was told if worsening to come back but pain subsided. Over the last 2 days pain has come back worse than before with associated nausea too. Unable to tolerate PO today.

## 2020-10-13 NOTE — ED ADULT NURSE NOTE - OBJECTIVE STATEMENT
intermittent cp sob and LUQ pain with N/V intermittent x one week.  Pain worse with eating.  She denies fever/chills.  Patient had covid pneumonia in March and was hospitalized x one week. Took her a long time to feel better  approx 3 months after her diagnosis,  but recently felt like it is "acting up" again

## 2020-10-13 NOTE — ED STATDOCS - PATIENT PORTAL LINK FT
You can access the FollowMyHealth Patient Portal offered by [facility] by registering at the following website: [email or web address]. By joining [facility]’s FollowMyHealth portal, you will also be able to view your health information using other applications (apps) compatible with our system.

## 2020-10-13 NOTE — ED STATDOCS - PROGRESS NOTE DETAILS
Reviewed results of Lab work, CTA and CT abd./pelvis with patient. Agreed to F/U with Gastroenterologist. Hilaria NP

## 2020-10-13 NOTE — ED STATDOCS - OBJECTIVE STATEMENT
29 y/o female with a PMHx of POTS, sleep apnea, GERD, endometriosis, vertigo, vasovagal syncope, anxiety, IBS, presents to the ED c/o abdominal pain, nausea, and vomiting x2 weeks. Pt reports LUQ abdominal pain. Pt was seen at urgent care 2 weeks ago and told to f/u if symptoms worsened. States the past 2 days pain has returned so came to the ED today. Pt reports she was Dx with COVID and b/l PNA in 03/2020 requiring hospitalization. States she has had intermittent SOB since being Dx with COVID. Unable to tolerate PO today and had an episode of vomiting PTA. No dysuria, no leg swelling or pain. No Hx of blood clots.   Nonsmoker. On birth control. No other complaints at this time. Allergies: Cipro, codeine, Pediazole, sulfa drugs. PCP: Merrill Erickson.

## 2020-10-13 NOTE — ED STATDOCS - CARE PROVIDER_API CALL
Blair Roque  GASTROENTEROLOGY  [address] [place] [address] [place] [address]  Phone: [phone]  Fax: [phone]  Follow Up Time:

## 2020-10-13 NOTE — ED STATDOCS - ATTENDING CONTRIBUTION TO CARE
I,Merrill Oropeza MD,  performed the initial face to face bedside interview with this patient regarding history of present illness, review of symptoms and relevant past medical, social and family history.  I completed an independent physical examination.  I was the initial provider who evaluated this patient. I have signed out the follow up of any pending tests (i.e. labs, radiological studies) to the ACP.  I have communicated the patient’s plan of care and disposition with the ACP.  The history, relevant review of systems, past medical and surgical history, medical decision making, and physical examination was documented by the scribe in my presence and I attest to the accuracy of the documentation.

## 2020-10-13 NOTE — ED STATDOCS - NSFOLLOWUPINSTRUCTIONS_ED_ALL_ED_FT
Abdominal Pain    WHAT YOU NEED TO KNOW:    Abdominal pain can be dull, achy, or sharp. You may have pain in one area of your abdomen, or in your entire abdomen. Your pain may be caused by a condition such as constipation, food sensitivity or poisoning, infection, or a blockage. Abdominal pain can also be from a hernia, appendicitis, or an ulcer. Liver, gallbladder, or kidney conditions can also cause abdominal pain. The cause of your abdominal pain may be unknown.     DISCHARGE INSTRUCTIONS:    Return to the emergency department if:   •You have new chest pain or shortness of breath.      •You have pulsing pain in your upper abdomen or lower back that suddenly becomes constant.      •Your pain is in the right lower abdominal area and worsens with movement.       •You have a fever over 100.4°F (38°C) or shaking chills.       •You are vomiting and cannot keep food or liquids down.       •Your pain does not improve or gets worse over the next 8 to 12 hours.       •You see blood in your vomit or bowel movements, or they look black and tarry.       •Your skin or the whites of your eyes turn yellow.       •You are a woman and have a large amount of vaginal bleeding that is not your monthly period.       Contact your healthcare provider if:   •You have pain in your lower back.       •You are a man and have pain in your testicles.      •You have pain when you urinate.       •You have questions or concerns about your condition or care.      Follow up with your healthcare provider within 24 hours or as directed: Write down your questions so you remember to ask them during your visits.     Medicines:   •Medicines may be given to calm your stomach and prevent vomiting or to decrease pain. Ask how to take pain medicine safely.      •Take your medicine as directed. Contact your healthcare provider if you think your medicine is not helping or if you have side effects. Tell him of her if you are allergic to any medicine. Keep a list of the medicines, vitamins, and herbs you take. Include the amounts, and when and why you take them. Bring the list or the pill bottles to follow-up visits. Carry your medicine list with you in case of an emergency.    Rest. Drink plenty of fluids. Tylenol 650 mg. PO every 4 hrs. for pain.   Follow up with Gastroenterologist.

## 2020-10-13 NOTE — ED STATDOCS - CPE ED GASTRO NORM
[Today's Date] : [unfilled] [Name] : Name: [unfilled] [] : : ~~ [Today's Date:] : [unfilled] [Dear  ___:] : Dear Dr. [unfilled]: [Consult] : I had the pleasure of evaluating your patient, [unfilled]. My full evaluation follows. [Consult - Single Provider] : Thank you very much for allowing me to participate in the care of this patient. If you have any questions, please do not hesitate to contact me. [Sincerely,] : Sincerely, [FreeTextEntry4] : Gary Crenshaw MD [FreeTextEntry5] : [id number] [facility] [FreeTextEntry6] : [place] [id number] [de-identified] : Barry E. Goldberg, MD, FACC, FAAP, FASE\par [facility]\par [facility]\par Chief Pediatric Cardiology\par  normal...

## 2020-10-13 NOTE — ED STATDOCS - NS ED ATTENDING STATEMENT MOD
Severity of Illness
I have personally performed a face to face diagnostic evaluation on this patient. I have reviewed the ACP note and agree with the history, exam and plan of care, except as noted.

## 2020-10-13 NOTE — ED STATDOCS - CARE PROVIDERS DIRECT ADDRESSES
[email or web address]

## 2020-10-18 ENCOUNTER — TRANSCRIPTION ENCOUNTER (OUTPATIENT)
Age: 30
End: 2020-10-18

## 2020-10-21 ENCOUNTER — APPOINTMENT (OUTPATIENT)
Age: 30
End: 2020-10-21
Payer: MEDICAID

## 2020-10-21 VITALS
WEIGHT: 132 LBS | TEMPERATURE: 98.2 F | HEIGHT: 60 IN | DIASTOLIC BLOOD PRESSURE: 70 MMHG | OXYGEN SATURATION: 98 % | HEART RATE: 93 BPM | SYSTOLIC BLOOD PRESSURE: 110 MMHG | BODY MASS INDEX: 25.91 KG/M2 | RESPIRATION RATE: 17 BRPM

## 2020-10-21 DIAGNOSIS — R05 COUGH: ICD-10-CM

## 2020-10-21 DIAGNOSIS — J45.998 OTHER ASTHMA: ICD-10-CM

## 2020-10-21 PROCEDURE — 94010 BREATHING CAPACITY TEST: CPT

## 2020-10-21 PROCEDURE — 99072 ADDL SUPL MATRL&STAF TM PHE: CPT

## 2020-10-21 PROCEDURE — 99205 OFFICE O/P NEW HI 60 MIN: CPT | Mod: 25

## 2020-10-21 PROCEDURE — 94729 DIFFUSING CAPACITY: CPT

## 2020-10-21 PROCEDURE — 94727 GAS DIL/WSHOT DETER LNG VOL: CPT

## 2020-10-21 NOTE — HISTORY OF PRESENT ILLNESS
[Never] : never [TextBox_4] : Patient is a 30 year old female cystic fibrosis carrier gene, s/p recent COVID pneumonia March 2020, presents to [facility] for evaluation.  Patient reports since COVID she has had a cough.  She reports fatigue and shortness of breath at times.  She states she awakens during the night gasping for air.  She does have Hx NELA however states it was reported as mild. She reports she did not experience these symptoms prior to COVID.  She is here for evaluation of these symptoms.

## 2020-10-21 NOTE — REASON FOR VISIT
[Initial] : an initial visit [Shortness of Breath] : shortness of breath [TextBox_44] : s/p COVID pneumonia

## 2020-10-21 NOTE — ASSESSMENT
[FreeTextEntry1] : 30 year old female cyctic fibrosis carrier gene, s/p COVID pneumonia March 2020 presents for follow up post infectious bronchospasm vs mild asthma, possible NELA\par \par Repeat Sleep Study\par Trial of Alvesco 1 puff BID\par \par Follow up 4-6 weeks.\par \par

## 2020-10-21 NOTE — PROCEDURE
[FreeTextEntry1] : PFT 10/21/20 personally reviewed mild obstructive ventilatory defect without gas exchange abnormality\par \par CT angio 10/12/20 personally reviewed No gross evidence PE or acute intrabdominal pathology

## 2020-10-22 ENCOUNTER — APPOINTMENT (OUTPATIENT)
Dept: CARDIOLOGY | Facility: CLINIC | Age: 30
End: 2020-10-22
Payer: MEDICAID

## 2020-10-22 VITALS
WEIGHT: 133 LBS | DIASTOLIC BLOOD PRESSURE: 76 MMHG | HEART RATE: 77 BPM | TEMPERATURE: 97.7 F | BODY MASS INDEX: 26.11 KG/M2 | HEIGHT: 60 IN | SYSTOLIC BLOOD PRESSURE: 126 MMHG | OXYGEN SATURATION: 100 %

## 2020-10-22 DIAGNOSIS — I49.8 OTHER SPECIFIED CARDIAC ARRHYTHMIAS: ICD-10-CM

## 2020-10-22 DIAGNOSIS — U07.1 COVID-19: ICD-10-CM

## 2020-10-22 DIAGNOSIS — R00.2 PALPITATIONS: ICD-10-CM

## 2020-10-22 PROCEDURE — 99213 OFFICE O/P EST LOW 20 MIN: CPT | Mod: 25

## 2020-10-22 PROCEDURE — 93306 TTE W/DOPPLER COMPLETE: CPT

## 2020-10-22 NOTE — REVIEW OF SYSTEMS
[Fever] : no fever [Headache] : no headache [Recent Weight Gain (___ Lbs)] : recent [unfilled] ~Ulb weight gain [Chills] : no chills [Feeling Fatigued] : not feeling fatigued [Blurry Vision] : no blurred vision [Eyeglasses] : not currently wearing eyeglasses [Earache] : no earache [Mouth Sores] : no mouth sores [Shortness Of Breath] : no shortness of breath [Dyspnea on exertion] : dyspnea during exertion [Chest  Pressure] : no chest pressure [Chest Pain] : no chest pain [Lower Ext Edema] : no extremity edema [Palpitations] : palpitations [Cough] : no cough [Wheezing] : no wheezing [Coughing Up Blood] : no hemoptysis [Skin: A Rash] : no rash: [see HPI] : see HPI [Dizziness] : no dizziness [Convulsions] : no convulsions [Tingling (Paresthesia)] : no tingling [Depression] : no depression [Anxiety] : anxiety [Suicidal] : not suicidal [Negative] : Heme/Lymph

## 2020-10-22 NOTE — HISTORY OF PRESENT ILLNESS
[FreeTextEntry1] : July, 2013. The patient had a positive tilt test. After about 20 minutes her heart rate went up to 150 and her blood pressure dropped. Her blood pressure then sol. Dr. Alvarez spoke to the patient on the phone on Friday. He advised her to eat more salt and drink more liquids. This morning the patient got out of bed and walk to the kitchen. On the way to the kitchen she blacked out. She still gets a heart rate of 150 with minimal activity.\par \par March 25, 2019. First return visit since above. I believe Dr. Alvarez tried pindolol at one point but unclear if she took it or if it helped. She actually has been doing fairly well with vasovagal episodes mostly just during venipuncture and her panic attacks and anxiety have been under good control. She also claims she has sleep apnea, with 13-17 episodes of apnea per night. She was told it was because she had multiple nasal fractures, most from horseback riding, but she sleeps with her bed elevated and stays on her side and does not seem to have a problem. She has a neurological issues as mentioned and is followed by Dr. Dwight Rosenstein and had seen Dr. Merrill Bowen of neurosurgery (see enclosed notes in [employer]). For some reason over the last, month she's had about 4 episodes of what she calls fainting spells. Sounds like similar to her tilt table response, and they've come on pretty much at random. Once at [employer], once while walking to a restaurant, et cetera. She has enough warning to sit down in the car or on the side of a curb and think she passes out just briefly. She is exhausted afterwards and can feel fatigued for up to 2 hours. (I did do venipuncture myself, monitored her and she seemed to be having some distress, but had strong normal pulse throughout, not tachycardic, etc.) After exam and long discussion we decided to try low-dose beta blocker.\par (Mother also very concerned because the patient's grandmother was Jessica Sotelo, who had multiple severe problems, including antiphospholipid syndrome, treated by Dr. Alvarez. The patient's sister has cystic fibrosis and there are a lot of cardiac issues throughout the family. I reassured them that the patient was too young for coronary artery disease, and that she has already had 2 or 3 normal echos with Dr. Alvarez, most recent being 2013.)\par October 10, 2019.  Patient returns in follow-up complaining of palpitations.  EKG here with sinus rhythm and unchanged and rhythm strip chest shows sinus arrhythmia.  Unclear if the bisoprolol helped her but after 1 month she stopped it because her blood pressure was in the 80s and she felt a little lightheaded and dizzy.  She said she had a same problem with pindolol in the past.  She now has what she calls a new symptom of feeling her heart racing and feeling an absence of air that can be on and off or last all day and at times is continuous.  May be one episode while working out where her heart rate was 160 according to her watch.  She claims she had no symptoms during the rhythm strip here.  She is conscious about maintaining hydration and salt load etc. Fitbit watch did capture 2 episodes one during sleep which is recorded a heart rate of 150 and once during the day of 170.  Discussed multiple options including different medication, EP study, loop recorder, and decided on a 30-day MCOT monitor first.\par November 29, 2019.  Reviewed the patient's 30-day monitor and finally had a chance to speak with her. Does have symptomatic sinus tach occasionally 150-170. Also has some VPCs that are very symptomatic in terms of the post VPC pause. After long discussion with the patient and the fact that she has not tolerated any beta-blockers I am referring her to Dr. Roberto West of EPS for further advice and management. \par December 10, 2019.  Electrophysiology consult by Dr. West. n summary, Claudia Lenz is a 30y/o woman with long standing Hx of palpitations, anxiety/panic attacks (not on medication), mild NELA (no recent sleep study and not on CPAP), syncope and near syncope, and POTS (diagnosed in 2012) who presents today for initial evaluation. Admits recurring episodes of tachycardia up to the 200s with minimal exertion, up to the 240s as per patient with exercise. Unable to tolerate activity due to feeling of near syncope or actual fainting. States her heart will race with standing and gradually return to baseline. Has utilized beta blockers in the past but was unable to tolerate secondary to hypotension. Never attempted midodrine/beta blocker combination. Also experiences dyspnea when her heart races as well as episodes of feeling her heart skip a beat. Underwent Holter monitoring which revealed episodes of sinus tachycardia to the 170s (was not exercising or exerting herself at that time) as well as episodes of isolated PVCs. Has family history of POTS (grandmother) as well as arrhythmias including PVCs (mother) and atrial fibrillation (grandmother). Has done research on POTS and follows the [facility] protocol/guidelines and attempts salt loading and increased hydration with minimal relief. No recent ECHO or stress test. EKG today NSR without evidence of PVCs. Given no recent stress test or ECHO, recommend undergoing both. Continue utilizing salt and staying well hydrated. Review of prior tilt table test revealed evidence of vasovagal syncope as well. Instructed on safety mechanisms such as staying well hydrated, utilizing salt, avoiding prolonged standing with knees locked, and to lie down with feet elevated if symptoms of near syncope occur. Can also consider use of compression socks and should avoid known triggers for syncope such as alcohol and warm or crowded environments.\par January 14, 2020.  Dr. Roberto West.  "Patient's echocardiogram showed normal LV function and Holter showed single PVCs (outflow tract type), 1912 PVCs. We discussed ablation versus antiarrhythmic medication and for now she wanted reassurance that the PVCs posed little risk, given normal LV function." \par April 13, 2020.  In the beginning of March patient had diarrhea and extreme fatigue for about a week and then after that noted initially low-grade temp that very rapidly became high temperature with shortness of breath and she went to [facility] and was treated there for a week for Covid 19 a month ago.  Chest x-ray had bilateral opacities.  She did respond well to therapy and did not need to be in an ICU or intubated.  She was released on March 20 and has been asymptomatic and afebrile since then.  Before she was released they were very concerned about her fluctuating heart rate and her occasional sinus tachycardia which she assured them was her baseline condition as it is.  She still has her usual symptoms and her palpitations have been mostly consistent with VPCs followed by compensatory pause and stronger heartbeat after the VPC.  She is able to manage her palpitations and her heart rate and is not interested in further therapy or ablation at this time.  She came in here because she has shortness of breath when her heart rate is fast which is her baseline I wanted to just make sure she was fine after having had coded 19 over 4 weeks ago.  Her EKG here is sinus rhythm at 90 with one VPC and is otherwise within normal limits.  Her O2 sat was 100% and she was afebrile\par October 6, 2020.  It is now almost 7 months since she had her episode of COVID and seemed to have fully recovered.  Sudden stress with her father finding out he had severe LAD disease and having an acute angioplasty followed by an attempt at ablation for atrial fibrillation that caused a cardiac arrest.  Mother went for a coronary calcium score and was found to be mildly abnormal but also in the proximal LAD.  Now patient with severe fatigue shortness of breath anxiety and is convinced that this is all from post COVID lung or heart disease.  Claims to have remained severely fatigued since she was diagnosed etc.  My initial feeling was to have her hooked up with Dr. Charisse Santos years post COVID program for closer follow-up and monitoring although I now feel that her symptoms are probably not COVID related.  We will have her come back for an echocardiogram just to be sure and labs were sent.\par October 22, 2020.  Patient returns for echo and follow-up.  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  Her echo was totally normal and unchanged from January echo that was pre-Covid.  Symptoms seem to be improved although she still complains of extra beats etc. especially during the night and occasionally wakes her up.

## 2020-11-16 ENCOUNTER — APPOINTMENT (OUTPATIENT)
Age: 30
End: 2020-11-16

## 2021-01-20 ENCOUNTER — TRANSCRIPTION ENCOUNTER (OUTPATIENT)
Age: 31
End: 2021-01-20

## 2021-02-10 NOTE — REVIEW OF SYSTEMS
Number Of Stages Required To Clear Tumor (Optional): 2 [Fatigue] : fatigue [Cough] : cough [Chest Tightness] : chest tightness [Chest Discomfort] : chest discomfort [Anxiety] : anxiety [Negative] : Endocrine

## 2021-03-03 LAB
SARS-COV-2 IGG SERPL IA-ACNC: 21.8 INDEX
SARS-COV-2 IGG SERPL QL IA: POSITIVE

## 2021-04-10 ENCOUNTER — EMERGENCY (EMERGENCY)
Facility: HOSPITAL | Age: 31
LOS: 0 days | Discharge: HOME | End: 2021-04-10
Attending: EMERGENCY MEDICINE
Payer: MEDICAID

## 2021-04-10 VITALS — HEIGHT: 60 IN | WEIGHT: 115.08 LBS

## 2021-04-10 VITALS
HEART RATE: 91 BPM | OXYGEN SATURATION: 99 % | RESPIRATION RATE: 16 BRPM | DIASTOLIC BLOOD PRESSURE: 81 MMHG | SYSTOLIC BLOOD PRESSURE: 144 MMHG | TEMPERATURE: 98 F

## 2021-04-10 DIAGNOSIS — Z86.16 PERSONAL HISTORY OF COVID-19: ICD-10-CM

## 2021-04-10 DIAGNOSIS — R06.02 SHORTNESS OF BREATH: ICD-10-CM

## 2021-04-10 DIAGNOSIS — C53.9 MALIGNANT NEOPLASM OF CERVIX UTERI, UNSPECIFIED: ICD-10-CM

## 2021-04-10 DIAGNOSIS — F41.9 ANXIETY DISORDER, UNSPECIFIED: ICD-10-CM

## 2021-04-10 DIAGNOSIS — J40 BRONCHITIS, NOT SPECIFIED AS ACUTE OR CHRONIC: ICD-10-CM

## 2021-04-10 DIAGNOSIS — Z88.2 ALLERGY STATUS TO SULFONAMIDES: ICD-10-CM

## 2021-04-10 DIAGNOSIS — K21.9 GASTRO-ESOPHAGEAL REFLUX DISEASE WITHOUT ESOPHAGITIS: ICD-10-CM

## 2021-04-10 DIAGNOSIS — N80.9 ENDOMETRIOSIS, UNSPECIFIED: ICD-10-CM

## 2021-04-10 DIAGNOSIS — G47.30 SLEEP APNEA, UNSPECIFIED: ICD-10-CM

## 2021-04-10 DIAGNOSIS — Z88.1 ALLERGY STATUS TO OTHER ANTIBIOTIC AGENTS STATUS: ICD-10-CM

## 2021-04-10 DIAGNOSIS — Z88.5 ALLERGY STATUS TO NARCOTIC AGENT: ICD-10-CM

## 2021-04-10 DIAGNOSIS — J45.901 UNSPECIFIED ASTHMA WITH (ACUTE) EXACERBATION: ICD-10-CM

## 2021-04-10 DIAGNOSIS — I49.8 OTHER SPECIFIED CARDIAC ARRHYTHMIAS: ICD-10-CM

## 2021-04-10 DIAGNOSIS — K58.9 IRRITABLE BOWEL SYNDROME WITHOUT DIARRHEA: ICD-10-CM

## 2021-04-10 LAB
ALBUMIN SERPL ELPH-MCNC: 3.7 G/DL — SIGNIFICANT CHANGE UP (ref 3.3–5)
ALP SERPL-CCNC: 41 U/L — SIGNIFICANT CHANGE UP (ref 40–120)
ALT FLD-CCNC: 16 U/L — SIGNIFICANT CHANGE UP (ref 12–78)
ANION GAP SERPL CALC-SCNC: 1 MMOL/L — LOW (ref 5–17)
AST SERPL-CCNC: 10 U/L — LOW (ref 15–37)
BASOPHILS # BLD AUTO: 0.05 K/UL — SIGNIFICANT CHANGE UP (ref 0–0.2)
BASOPHILS NFR BLD AUTO: 0.6 % — SIGNIFICANT CHANGE UP (ref 0–2)
BILIRUB SERPL-MCNC: 0.3 MG/DL — SIGNIFICANT CHANGE UP (ref 0.2–1.2)
BUN SERPL-MCNC: 14 MG/DL — SIGNIFICANT CHANGE UP (ref 7–23)
CALCIUM SERPL-MCNC: 9.2 MG/DL — SIGNIFICANT CHANGE UP (ref 8.5–10.1)
CHLORIDE SERPL-SCNC: 111 MMOL/L — HIGH (ref 96–108)
CO2 SERPL-SCNC: 29 MMOL/L — SIGNIFICANT CHANGE UP (ref 22–31)
CREAT SERPL-MCNC: 0.99 MG/DL — SIGNIFICANT CHANGE UP (ref 0.5–1.3)
D DIMER BLD IA.RAPID-MCNC: <150 NG/ML DDU — SIGNIFICANT CHANGE UP
EOSINOPHIL # BLD AUTO: 0.12 K/UL — SIGNIFICANT CHANGE UP (ref 0–0.5)
EOSINOPHIL NFR BLD AUTO: 1.4 % — SIGNIFICANT CHANGE UP (ref 0–6)
GLUCOSE SERPL-MCNC: 85 MG/DL — SIGNIFICANT CHANGE UP (ref 70–99)
HCG SERPL-ACNC: <1 MIU/ML — SIGNIFICANT CHANGE UP
HCT VFR BLD CALC: 38.5 % — SIGNIFICANT CHANGE UP (ref 34.5–45)
HGB BLD-MCNC: 12.5 G/DL — SIGNIFICANT CHANGE UP (ref 11.5–15.5)
IMM GRANULOCYTES NFR BLD AUTO: 0.3 % — SIGNIFICANT CHANGE UP (ref 0–1.5)
LYMPHOCYTES # BLD AUTO: 2.28 K/UL — SIGNIFICANT CHANGE UP (ref 1–3.3)
LYMPHOCYTES # BLD AUTO: 25.7 % — SIGNIFICANT CHANGE UP (ref 13–44)
MCHC RBC-ENTMCNC: 30 PG — SIGNIFICANT CHANGE UP (ref 27–34)
MCHC RBC-ENTMCNC: 32.5 GM/DL — SIGNIFICANT CHANGE UP (ref 32–36)
MCV RBC AUTO: 92.5 FL — SIGNIFICANT CHANGE UP (ref 80–100)
MONOCYTES # BLD AUTO: 0.57 K/UL — SIGNIFICANT CHANGE UP (ref 0–0.9)
MONOCYTES NFR BLD AUTO: 6.4 % — SIGNIFICANT CHANGE UP (ref 2–14)
NEUTROPHILS # BLD AUTO: 5.83 K/UL — SIGNIFICANT CHANGE UP (ref 1.8–7.4)
NEUTROPHILS NFR BLD AUTO: 65.6 % — SIGNIFICANT CHANGE UP (ref 43–77)
PLATELET # BLD AUTO: 326 K/UL — SIGNIFICANT CHANGE UP (ref 150–400)
POTASSIUM SERPL-MCNC: 3.8 MMOL/L — SIGNIFICANT CHANGE UP (ref 3.5–5.3)
POTASSIUM SERPL-SCNC: 3.8 MMOL/L — SIGNIFICANT CHANGE UP (ref 3.5–5.3)
PROT SERPL-MCNC: 7.2 GM/DL — SIGNIFICANT CHANGE UP (ref 6–8.3)
RAPID RVP RESULT: SIGNIFICANT CHANGE UP
RBC # BLD: 4.16 M/UL — SIGNIFICANT CHANGE UP (ref 3.8–5.2)
RBC # FLD: 12.3 % — SIGNIFICANT CHANGE UP (ref 10.3–14.5)
SARS-COV-2 RNA SPEC QL NAA+PROBE: SIGNIFICANT CHANGE UP
SODIUM SERPL-SCNC: 141 MMOL/L — SIGNIFICANT CHANGE UP (ref 135–145)
WBC # BLD: 8.88 K/UL — SIGNIFICANT CHANGE UP (ref 3.8–10.5)
WBC # FLD AUTO: 8.88 K/UL — SIGNIFICANT CHANGE UP (ref 3.8–10.5)

## 2021-04-10 PROCEDURE — 0225U NFCT DS DNA&RNA 21 SARSCOV2: CPT

## 2021-04-10 PROCEDURE — 85379 FIBRIN DEGRADATION QUANT: CPT

## 2021-04-10 PROCEDURE — 99284 EMERGENCY DEPT VISIT MOD MDM: CPT | Mod: 25

## 2021-04-10 PROCEDURE — 96374 THER/PROPH/DIAG INJ IV PUSH: CPT

## 2021-04-10 PROCEDURE — 80053 COMPREHEN METABOLIC PANEL: CPT

## 2021-04-10 PROCEDURE — 93010 ELECTROCARDIOGRAM REPORT: CPT

## 2021-04-10 PROCEDURE — 84702 CHORIONIC GONADOTROPIN TEST: CPT

## 2021-04-10 PROCEDURE — 93005 ELECTROCARDIOGRAM TRACING: CPT

## 2021-04-10 PROCEDURE — 36415 COLL VENOUS BLD VENIPUNCTURE: CPT

## 2021-04-10 PROCEDURE — 71045 X-RAY EXAM CHEST 1 VIEW: CPT

## 2021-04-10 PROCEDURE — 99285 EMERGENCY DEPT VISIT HI MDM: CPT

## 2021-04-10 PROCEDURE — 71045 X-RAY EXAM CHEST 1 VIEW: CPT | Mod: 26

## 2021-04-10 PROCEDURE — 85025 COMPLETE CBC W/AUTO DIFF WBC: CPT

## 2021-04-10 RX ORDER — ALBUTEROL 90 UG/1
2 AEROSOL, METERED ORAL ONCE
Refills: 0 | Status: DISCONTINUED | OUTPATIENT
Start: 2021-04-10 | End: 2021-04-10

## 2021-04-10 RX ORDER — SODIUM CHLORIDE 9 MG/ML
1000 INJECTION INTRAMUSCULAR; INTRAVENOUS; SUBCUTANEOUS ONCE
Refills: 0 | Status: COMPLETED | OUTPATIENT
Start: 2021-04-10 | End: 2021-04-10

## 2021-04-10 RX ADMIN — SODIUM CHLORIDE 1000 MILLILITER(S): 9 INJECTION INTRAMUSCULAR; INTRAVENOUS; SUBCUTANEOUS at 17:47

## 2021-04-10 RX ADMIN — Medication 125 MILLIGRAM(S): at 18:17

## 2021-04-10 NOTE — ED STATDOCS - PATIENT PORTAL LINK FT
You can access the FollowMyHealth Patient Portal offered by [facility] by registering at the following website: [email or web address]. By joining [facility]’s FollowMyHealth portal, you will also be able to view your health information using other applications (apps) compatible with our system.

## 2021-04-10 NOTE — ED STATDOCS - CLINICAL SUMMARY MEDICAL DECISION MAKING FREE TEXT BOX
plan: labs including preg test, ddimer, rvp/covid, iv solumedrol, monitor, observe, reassess plan: labs including preg test, ddimer, rvp/covid, iv solumedrol, monitor, observe, reassess    PA note: RVP NEG. All labwork and studies reviewed in details with patient. Patient re-examined and re-evaluated. Patient feels much better at this time. ED evaluation, Diagnosis and management discussed with the patient in detail. Workup results discussed with ED attending, patient likely has exacerbation of reactive airway disease, OK to dc home on PO Steroids, no need for ABX. Close PMD follow up encouraged.  Strict ED return instructions discussed in detail and patient given the opportunity to ask any questions about their discharge diagnosis and instructions. Patient verbalized understanding. ~ Alex Zarco PA-C

## 2021-04-10 NOTE — ED STATDOCS - ATTENDING CONTRIBUTION TO CARE
I, Dorian Pierson MD, personally saw the patient with the PA, and completed the key components of the history and physical exam. I then discussed the management plan with the PA.

## 2021-04-10 NOTE — ED ADULT NURSE NOTE - OBJECTIVE STATEMENT
PT comes to the ED complaining of increased shortness of breath on exertion. PT states that she had Covid and PNA a year ago and since then has had respiratory difficulties intermittently. No acute distress noted.

## 2021-04-10 NOTE — ED STATDOCS - OBJECTIVE STATEMENT
30 y/o female with pmhx of POTS, sleep apnea, GERD, endometriosis, vertigo, vasovagal syncope, anxiety, IBS presents to the ED c/o SOB. 30 y/o female with pmhx of POTS, sleep apnea, GERD, endometriosis, vertigo, vasovagal syncope, anxiety, IBS, COVID PNA March 2020, cervical cancer ressection next month presents to the ED c/o SOB, cough, general weakness/fatigue. symptoms not like when she was hospitalized for COVID, cough poorly productive. clear sputum. no associate with fever, chills , chest pain, loss of appetite, headache. pt reports COVID antibody test one moth ago, still positive. No other complaints at this time.

## 2021-04-10 NOTE — ED STATDOCS - NSFOLLOWUPINSTRUCTIONS_ED_ALL_ED_FT
Acute Bronchitis, Adult       Acute bronchitis is when air tubes in the lungs (bronchi) suddenly get swollen. The condition can make it hard for you to breathe. In adults, acute bronchitis usually goes away within 2 weeks. A cough caused by bronchitis may last up to 3 weeks. Smoking, allergies, and asthma can make the condition worse.      What are the causes?  This condition is caused by:  •Cold and flu viruses. The most common cause of this condition is the virus that causes the common cold.       •Bacteria.     •Substances that irritate the lungs, including:   •Smoke from cigarettes and other types of tobacco.      •Dust and pollen.       •Fumes from chemicals, gases, or burned fuel.      •Other materials that pollute indoor or outdoor air.         •Close contact with someone who has acute bronchitis.        What increases the risk?  The following factors may make you more likely to develop this condition:  •A weak body's defense system. This is also called the immune system.       •Any condition that affects your lungs and breathing, such as asthma.        What are the signs or symptoms?  Symptoms of this condition include:  •A cough.      •Coughing up clear, yellow, or green mucus.      •Wheezing.      •Chest congestion.      •Shortness of breath.      •A fever.      •Body aches.      •Chills.      •A sore throat.        How is this treated?  Acute bronchitis may go away over time without treatment. Your doctor may recommend:  •Drinking more fluids.       •Taking a medicine for a fever or cough.       •Using a device that gets medicine into your lungs (inhaler).      •Using a vaporizer or a humidifier. These are machines that add water or moisture in the air to help with coughing and poor breathing.        Follow these instructions at home:     Activity     •Get a lot of rest.      •Avoid places where there are fumes from chemicals.      •Return to your normal activities as told by your doctor. Ask your doctor what activities are safe for you.      Lifestyle     •Drink enough fluids to keep your pee (urine) pale yellow.      • Do not drink alcohol.     • Do not use any products that contain nicotine or tobacco, such as cigarettes, e-cigarettes, and chewing tobacco. If you need help quitting, ask your doctor. Be aware that:  •Your bronchitis will get worse if you smoke or breathe in other people's smoke (secondhand smoke).      •Your lungs will heal faster if you quit smoking.        General instructions     •Take over-the-counter and prescription medicines only as told by your doctor.      •Use an inhaler, cool mist vaporizer, or humidifier as told by your doctor.      •Rinse your mouth often with salt water. To make salt water, dissolve ½–1 tsp (3–6 g) of salt in 1 cup (237 mL) of warm water.      •Keep all follow-up visits as told by your doctor. This is important.        How is this prevented?  To lower your risk of getting this condition again:  •Wash your hands often with soap and water. If soap and water are not available, use hand .      •Avoid contact with people who have cold symptoms.      •Try not to touch your mouth, nose, or eyes with your hands.      •Make sure to get the flu shot every year.        Contact a doctor if:    •Your symptoms do not get better in 2 weeks.      •You vomit more than once or twice.     •You have symptoms of loss of fluid from your body (dehydration). These include:   •Dark urine.       •Dry skin or eyes.      •Increased thirst.       •Headaches.       •Confusion.      •Muscle cramps.          Get help right away if:    •You cough up blood.      •You have chest pain.      •You have very bad shortness of breath.      •You become dehydrated.      •You faint or keep feeling like you are going to faint.      •You keep vomiting.      •You have a very bad headache.      •Your fever or chills get worse.      These symptoms may be an emergency. Do not wait to see if the symptoms will go away. Get medical help right away. Call your local emergency services (911 in the [place] Do not drive yourself to the hospital.       Summary    •Acute bronchitis is when air tubes in the lungs (bronchi) suddenly get swollen. In adults, acute bronchitis usually goes away within 2 weeks.      •Take over-the-counter and prescription medicines only as told by your doctor.      •Drink enough fluid to keep your pee (urine) pale yellow.      •Contact a doctor if your symptoms do not improve after 2 weeks of treatment.      •Get help right away if you cough up blood, faint, or have chest pain or shortness of breath.      This information is not intended to replace advice given to you by your health care provider. Make sure you discuss any questions you have with your health care provider.

## 2021-04-10 NOTE — ED ADULT TRIAGE NOTE - CHIEF COMPLAINT QUOTE
Pt. to the ED C/O SOB x several days- Denies CP- Pt. states hx. of Covid March 2020, Reactive Airway Disease Post Covid and Cervical cancer

## 2021-04-10 NOTE — ED STATDOCS - CARE PLAN
Principal Discharge DX:	Bronchitis  Secondary Diagnosis:	Reactive airway disease with acute exacerbation

## 2021-04-10 NOTE — ED STATDOCS - PROGRESS NOTE DETAILS
30 y/o female with pmhx of POTS, sleep apnea, GERD, endometriosis, vertigo, vasovagal syncope, anxiety, IBS, COVID PNA March 2020, cervical cancer ressection next month presents to the ED c/o SOB, cough, general weakness/fatigue. symptoms not like when she was hospitalized for COVID, cough poorly productive. clear sputum. no associate with fever, chills , chest pain, loss of appetite, headache. pt reports COVID antibody test one moth ago, still positive. No other complaints at this time. PA note: RVP NEG. All labwork and studies reviewed in details with patient. Patient re-examined and re-evaluated. Patient feels much better at this time. ED evaluation, Diagnosis and management discussed with the patient in detail. Workup results discussed with ED attending, patient likely has exacerbation of reactive airway disease, OK to dc home on PO Steroids, no need for ABX. Close PMD follow up encouraged.  Strict ED return instructions discussed in detail and patient given the opportunity to ask any questions about their discharge diagnosis and instructions. Patient verbalized understanding. ~ Alex Zarco PA-C PA: Patient is a 32 y/o female with PMHx of POTS, sleep apnea, GERD, endometriosis, vertigo, vasovagal syncope, anxiety, IBS, COVID PNA March 2020 followed by aftermath diagnosis of "Reactive Airway Disease", cervical cancer who presents to [facility] c/o SOB, cough, wheezing, general weakness and fatigue. Patient's symptoms are not similar to her previous hospitalization from COVID last year. Patient c/o poorly productive cough with clear sputum. DENIES fever, chills , chest pain, loss of appetite, headache. PAtient had COVID antibody test 1 moth ago, still positive. No other complaints at this time. ~Alex Zarco PA-C   Patient seen and evaluated in . Will get CXR, labs, IV steroids. Reassess. ~Alex Zarco PA-C PA note: CXR & RVP NEG. All labwork and studies reviewed in details with patient. Patient re-examined and re-evaluated. Patient feels much better at this time. ED evaluation, Diagnosis and management discussed with the patient in detail. Workup results discussed with ED attending, patient likely has exacerbation of reactive airway disease, OK to dc home on PO Steroids, no need for ABX. Close PMD follow up encouraged.  Strict ED return instructions discussed in detail and patient given the opportunity to ask any questions about their discharge diagnosis and instructions. Patient verbalized understanding. ~ Alex Zarco PA-C

## 2021-04-10 NOTE — ED STATDOCS - PHYSICAL EXAMINATION
PA NOTE: GEN: AOX3, NAD. HEENT: Throat clear. Airway is patent. EYES: PERRLA. EOMI. Head: NC/AT. NECK: Supple, No JVD. FROM. C-spine non-tender. CV:S1S2, RRR, LUNGS: Non-labored breathing, no tachypnea. O2sat 100% RA. CTA b/l. No w/r/r. CHEST: Equal chest expansion and rise. No deformity. ABD: Soft, NT/ND, no rebound, no guarding. No CVAT. EXT: No e/c/c. 2+ distal pulses. SKIN: No rashes. NEURO: No focal deficits. CN II-XII intact. FROM. 5/5 motor and sensory. ~Alex Zarco PA-C

## 2021-06-06 NOTE — ED ADULT NURSE NOTE - TEMPLATE LIST FOR HEAD TO TOE ASSESSMENT
Patient arrived to ED via EMS with reports from the nursing home that patient is normally alert and oriented x3 and woke up this AM with AMS and the inability to follow commands. LKW was 0400 this AM. Multiple old bruising noted to bilateral shoulders, legs and and right forehead. DNR with comfort measures only form with patient.   
Respiratory

## 2021-10-06 PROBLEM — U07.1 PNEUMONIA DUE TO COVID-19 VIRUS: Status: ACTIVE | Noted: 2020-04-13

## 2022-01-03 ENCOUNTER — EMERGENCY (EMERGENCY)
Facility: HOSPITAL | Age: 32
LOS: 0 days | Discharge: HOME | End: 2022-01-03
Attending: FAMILY MEDICINE
Payer: MEDICAID

## 2022-01-03 VITALS — WEIGHT: 119.93 LBS | HEIGHT: 60 IN

## 2022-01-03 VITALS
RESPIRATION RATE: 18 BRPM | DIASTOLIC BLOOD PRESSURE: 101 MMHG | OXYGEN SATURATION: 100 % | SYSTOLIC BLOOD PRESSURE: 147 MMHG | HEART RATE: 129 BPM | TEMPERATURE: 100 F

## 2022-01-03 DIAGNOSIS — R05.9 COUGH, UNSPECIFIED: ICD-10-CM

## 2022-01-03 DIAGNOSIS — R07.9 CHEST PAIN, UNSPECIFIED: ICD-10-CM

## 2022-01-03 DIAGNOSIS — J06.9 ACUTE UPPER RESPIRATORY INFECTION, UNSPECIFIED: ICD-10-CM

## 2022-01-03 LAB
ADD ON TEST-SPECIMEN IN LAB: SIGNIFICANT CHANGE UP
ALBUMIN SERPL ELPH-MCNC: 3.8 G/DL — SIGNIFICANT CHANGE UP (ref 3.3–5)
ALP SERPL-CCNC: 49 U/L — SIGNIFICANT CHANGE UP (ref 40–120)
ALT FLD-CCNC: 27 U/L — SIGNIFICANT CHANGE UP (ref 12–78)
ANION GAP SERPL CALC-SCNC: 8 MMOL/L — SIGNIFICANT CHANGE UP (ref 5–17)
AST SERPL-CCNC: 33 U/L — SIGNIFICANT CHANGE UP (ref 15–37)
BASOPHILS # BLD AUTO: 0.05 K/UL — SIGNIFICANT CHANGE UP (ref 0–0.2)
BASOPHILS NFR BLD AUTO: 0.9 % — SIGNIFICANT CHANGE UP (ref 0–2)
BILIRUB SERPL-MCNC: 0.4 MG/DL — SIGNIFICANT CHANGE UP (ref 0.2–1.2)
BUN SERPL-MCNC: 11 MG/DL — SIGNIFICANT CHANGE UP (ref 7–23)
CALCIUM SERPL-MCNC: 9.6 MG/DL — SIGNIFICANT CHANGE UP (ref 8.5–10.1)
CHLORIDE SERPL-SCNC: 104 MMOL/L — SIGNIFICANT CHANGE UP (ref 96–108)
CO2 SERPL-SCNC: 24 MMOL/L — SIGNIFICANT CHANGE UP (ref 22–31)
CREAT SERPL-MCNC: 0.91 MG/DL — SIGNIFICANT CHANGE UP (ref 0.5–1.3)
EOSINOPHIL # BLD AUTO: 0.03 K/UL — SIGNIFICANT CHANGE UP (ref 0–0.5)
EOSINOPHIL NFR BLD AUTO: 0.5 % — SIGNIFICANT CHANGE UP (ref 0–6)
GLUCOSE SERPL-MCNC: 97 MG/DL — SIGNIFICANT CHANGE UP (ref 70–99)
HCG SERPL-ACNC: <1 MIU/ML — SIGNIFICANT CHANGE UP
HCT VFR BLD CALC: 40.4 % — SIGNIFICANT CHANGE UP (ref 34.5–45)
HGB BLD-MCNC: 13.5 G/DL — SIGNIFICANT CHANGE UP (ref 11.5–15.5)
IMM GRANULOCYTES NFR BLD AUTO: 0.2 % — SIGNIFICANT CHANGE UP (ref 0–1.5)
LYMPHOCYTES # BLD AUTO: 1.25 K/UL — SIGNIFICANT CHANGE UP (ref 1–3.3)
LYMPHOCYTES # BLD AUTO: 22.4 % — SIGNIFICANT CHANGE UP (ref 13–44)
MCHC RBC-ENTMCNC: 30.1 PG — SIGNIFICANT CHANGE UP (ref 27–34)
MCHC RBC-ENTMCNC: 33.4 GM/DL — SIGNIFICANT CHANGE UP (ref 32–36)
MCV RBC AUTO: 90.2 FL — SIGNIFICANT CHANGE UP (ref 80–100)
MONOCYTES # BLD AUTO: 0.54 K/UL — SIGNIFICANT CHANGE UP (ref 0–0.9)
MONOCYTES NFR BLD AUTO: 9.7 % — SIGNIFICANT CHANGE UP (ref 2–14)
NEUTROPHILS # BLD AUTO: 3.71 K/UL — SIGNIFICANT CHANGE UP (ref 1.8–7.4)
NEUTROPHILS NFR BLD AUTO: 66.3 % — SIGNIFICANT CHANGE UP (ref 43–77)
PLATELET # BLD AUTO: 292 K/UL — SIGNIFICANT CHANGE UP (ref 150–400)
POTASSIUM SERPL-MCNC: 4 MMOL/L — SIGNIFICANT CHANGE UP (ref 3.5–5.3)
POTASSIUM SERPL-SCNC: 4 MMOL/L — SIGNIFICANT CHANGE UP (ref 3.5–5.3)
PROT SERPL-MCNC: 8 GM/DL — SIGNIFICANT CHANGE UP (ref 6–8.3)
RBC # BLD: 4.48 M/UL — SIGNIFICANT CHANGE UP (ref 3.8–5.2)
RBC # FLD: 13.2 % — SIGNIFICANT CHANGE UP (ref 10.3–14.5)
SARS-COV-2 RNA SPEC QL NAA+PROBE: DETECTED
SODIUM SERPL-SCNC: 136 MMOL/L — SIGNIFICANT CHANGE UP (ref 135–145)
TROPONIN I, HIGH SENSITIVITY RESULT: 3.37 NG/L — SIGNIFICANT CHANGE UP
WBC # BLD: 5.59 K/UL — SIGNIFICANT CHANGE UP (ref 3.8–10.5)
WBC # FLD AUTO: 5.59 K/UL — SIGNIFICANT CHANGE UP (ref 3.8–10.5)

## 2022-01-03 PROCEDURE — 87635 SARS-COV-2 COVID-19 AMP PRB: CPT

## 2022-01-03 PROCEDURE — 71275 CT ANGIOGRAPHY CHEST: CPT | Mod: 26,MA

## 2022-01-03 PROCEDURE — 93005 ELECTROCARDIOGRAM TRACING: CPT

## 2022-01-03 PROCEDURE — 99285 EMERGENCY DEPT VISIT HI MDM: CPT | Mod: 25

## 2022-01-03 PROCEDURE — 99285 EMERGENCY DEPT VISIT HI MDM: CPT

## 2022-01-03 PROCEDURE — 71275 CT ANGIOGRAPHY CHEST: CPT | Mod: MA

## 2022-01-03 PROCEDURE — 36415 COLL VENOUS BLD VENIPUNCTURE: CPT

## 2022-01-03 PROCEDURE — 93010 ELECTROCARDIOGRAM REPORT: CPT

## 2022-01-03 PROCEDURE — 0225U NFCT DS DNA&RNA 21 SARSCOV2: CPT

## 2022-01-03 PROCEDURE — 85025 COMPLETE CBC W/AUTO DIFF WBC: CPT

## 2022-01-03 PROCEDURE — 84484 ASSAY OF TROPONIN QUANT: CPT

## 2022-01-03 PROCEDURE — 80053 COMPREHEN METABOLIC PANEL: CPT

## 2022-01-03 PROCEDURE — 84702 CHORIONIC GONADOTROPIN TEST: CPT

## 2022-01-03 NOTE — ED PROVIDER NOTE - CLINICAL SUMMARY MEDICAL DECISION MAKING FREE TEXT BOX
32 yo wf with hx tachycardia, pneumonia, bronchitis c/o cough . Pt not hypoxic or febrile. Labs, cxr viral swab. 32 yo wf with hx tachycardia, pneumonia, bronchitis c/o cough and cp which has improved . Pt not hypoxic or febrile. Labs, cxr viral swab.

## 2022-01-03 NOTE — ED PROVIDER NOTE - NSICDXPASTMEDICALHX_GEN_ALL_CORE_FT
PAST MEDICAL HISTORY:  Anxiety     Endometriosis     GERD (gastroesophageal reflux disease)     IBS (irritable bowel syndrome)     POTS (postural orthostatic tachycardia syndrome)     Sleep apnea     Vasovagal syncope     Vertigo      Ketoconazole Counseling:   Patient counseled regarding improving absorption with orange juice.  Adverse effects include but are not limited to breast enlargement, headache, diarrhea, nausea, upset stomach, liver function test abnormalities, taste disturbance, and stomach pain.  There is a rare possibility of liver failure that can occur when taking ketoconazole. The patient understands that monitoring of LFTs may be required, especially at baseline. The patient verbalized understanding of the proper use and possible adverse effects of ketoconazole.  All of the patient's questions and concerns were addressed.

## 2022-01-03 NOTE — ED PROVIDER NOTE - PROGRESS NOTE DETAILS
Babs Lawson for attending Dr. Bradshaw   30 y/o female with a PMHx of IBS, anxiety, afib, hospitalized COVID with pna, SVT, vasovagal syncope, vertigo, endometriosis, GERD, sleep apnea presents to the ED c/o chest pain and shortness of breath. Pt states worsening cough over past two days. Patient reports since 12/5, cp lingered and decreased at times. Pt hasn't taken any meds for her cough. Pulmonologist Dr. Ronda Bose    Exam: Lungs clear, heart regular but tachycardic, abd nontender, TORRES x 4 Babs Lawson for attending Dr. Bradshaw   32 y/o female with a PMHx of IBS, anxiety, afib, previously hospitalized COVID with pna, SVT, vasovagal syncope, vertigo, endometriosis, GERD, sleep apnea presents to the ED c/o chest pain and shortness of breath. Pt states she has had worsening cough over past two days. Patient reports since 12/5, cp lingered and decreased at times. Pt hasn't taken any meds for her cough. Pulmonologist Dr. Ronda Bose    Exam: Lungs clear, heart regular but tachycardic, abd nontender, TORRES x 4 Results reviewed and discussed with pt. Pt reports adverse reaction to steroids. Will try tessalon for cough.  Discussed importance of close FU with PMD/Pulm. HR improved, otherwise well appearing and stable for dc. Pt asked to return to ED immediately for any new or concerning sx or worsening. Pt acknowledges and understands plan -Victorino Villa PA-C

## 2022-01-03 NOTE — ED PROVIDER NOTE - PHYSICAL EXAMINATION
Constitutional: NAD AAOx3  Eyes: PERRLA EOMI  Head: NCAT  Mouth: MMM  Cardiac: s1s2. rrr  Lungs: CTA B/L. No accessory muscle use  Abd: soft, nd. NTTP. no r/g/r  Neuro: CN2-12 intact  Skin: No rashes

## 2022-01-03 NOTE — ED PROVIDER NOTE - OBJECTIVE STATEMENT
32 y/o f presents with cough. Pt reports cough since 12/5, felt like it was improving 4 days ago but became worse today. Pt with hx of bad bronchitis, was hospitalized for covid in march 2020. Pt also reports arrhythmia, tends to get elevated HR when she gets sick. On ocps. Denies fever/chills, n/v/d, CP, abd pain, LE swelling, or other complaints at this time. -Victorino Villa PA-C

## 2022-01-03 NOTE — ED ADULT NURSE NOTE - NSIMPLEMENTINTERV_GEN_ALL_ED
Implemented All Universal Safety Interventions:  [place] to call system. Call bell, personal items and telephone within reach. Instruct patient to call for assistance. Room bathroom lighting operational. Non-slip footwear when patient is off stretcher. Physically safe environment: no spills, clutter or unnecessary equipment. Stretcher in lowest position, wheels locked, appropriate side rails in place.

## 2022-01-03 NOTE — ED ADULT TRIAGE NOTE - CHIEF COMPLAINT QUOTE
Pt arrives to ED complaining of chest pain and shortness of breath. pt states worsening cough over past two days. Hx afib, SVT. Hr 130s in triage.

## 2022-01-03 NOTE — ED PROVIDER NOTE - NSFOLLOWUPINSTRUCTIONS_ED_ALL_ED_FT
Please follow up with your Primary MD in 1-2 days and Pulmonologist as soon as possible. Return to ED immediately for any new or concerning symptoms or worsening symptoms      Viral Respiratory Infection  A viral respiratory infection is an illness that affects parts of the body used for breathing, like the lungs, nose, and throat. It is caused by a germ called a virus.    ImageSome examples of this kind of infection are:    A cold.  The flu (influenza).  A respiratory syncytial virus (RSV) infection.    How do I know if I have this infection?  Most of the time this infection causes:    A stuffy or runny nose.  Yellow or green fluid in the nose.  A cough.  Sneezing.  Tiredness (fatigue).  Achy muscles.  A sore throat.  Sweating or chills.  A fever.  A headache.    How is this infection treated?  If the flu is diagnosed early, it may be treated with an antiviral medicine. This medicine shortens the length of time a person has symptoms. Symptoms may be treated with over-the-counter and prescription medicines, such as:    Expectorants. These make it easier to cough up mucus.  Decongestant nasal sprays.    Doctors do not prescribe antibiotic medicines for viral infections. They do not work with this kind of infection.    How do I know if I should stay home?  To keep others from getting sick, stay home if you have:    A fever.  A lasting cough.  A sore throat.  A runny nose.  Sneezing.  Muscles aches.  Headaches.  Tiredness.  Weakness.  Chills.  Sweating.  An upset stomach (nausea).    Follow these instructions at home:  Rest as much as possible.  Take over-the-counter and prescription medicines only as told by your doctor.  Drink enough fluid to keep your pee (urine) clear or pale yellow.  Gargle with salt water. Do this 3–4 times per day or as needed. To make a salt–water mixture, dissolve ½–1 tsp of salt in 1 cup of warm water. Make sure the salt dissolves all the way.  Use nose drops made from salt water. This helps with stuffiness (congestion). It also helps soften the skin around your nose.  Do not drink alcohol.  Do not use tobacco products, including cigarettes, chewing tobacco, and e-cigarettes. If you need help quitting, ask your doctor.  Get help if:  Your symptoms last for 10 days or longer.  Your symptoms get worse over time.  You have a fever.  You have very bad pain in your face or forehead.  Parts of your jaw or neck become very swollen.  Get help right away if:  You feel pain or pressure in your chest.  You have shortness of breath.  You faint or feel like you will faint.  You keep throwing up (vomiting).  You feel confused.  This information is not intended to replace advice given to you by your health care provider. Make sure you discuss any questions you have with your health care provider.

## 2022-01-03 NOTE — ED PROVIDER NOTE - PATIENT PORTAL LINK FT
You can access the FollowMyHealth Patient Portal offered by [facility] by registering at the following website: [email or web address]. By joining [employer]’s FollowMyHealth portal, you will also be able to view your health information using other applications (apps) compatible with our system.

## 2022-01-20 NOTE — ED STATDOCS - NS ED SCRIBE STATEMENT
PARISH  
Attending

## 2022-03-16 NOTE — ED ADULT NURSE NOTE - PRO INTERPRETER NEED 2
Prep Survey    Flowsheet Row Responses   [facility] facility patient discharged from? Non-BH   Is LACE score < 7 ? Non-BH Discharge   Emergency Room discharge w/ pulse ox? No   Eligibility [facility]   Hospital [facility]   Date of Discharge 03/16/22   Discharge diagnosis Total Knee   Does the patient have one of the following disease processes/diagnoses(primary or secondary)? Total Joint Replacement   Prep survey completed? Yes          ANETA FAUST - Registered Nurse         English

## 2022-04-26 ENCOUNTER — EMERGENCY (EMERGENCY)
Facility: HOSPITAL | Age: 32
LOS: 0 days | Discharge: HOME | End: 2022-04-26
Attending: EMERGENCY MEDICINE
Payer: MEDICAID

## 2022-04-26 VITALS
OXYGEN SATURATION: 100 % | RESPIRATION RATE: 18 BRPM | HEART RATE: 64 BPM | SYSTOLIC BLOOD PRESSURE: 128 MMHG | DIASTOLIC BLOOD PRESSURE: 91 MMHG | TEMPERATURE: 98 F

## 2022-04-26 VITALS — HEIGHT: 60 IN | WEIGHT: 293 LBS

## 2022-04-26 DIAGNOSIS — F41.9 ANXIETY DISORDER, UNSPECIFIED: ICD-10-CM

## 2022-04-26 DIAGNOSIS — S31.821A LACERATION WITHOUT FOREIGN BODY OF LEFT BUTTOCK, INITIAL ENCOUNTER: ICD-10-CM

## 2022-04-26 DIAGNOSIS — Z88.2 ALLERGY STATUS TO SULFONAMIDES: ICD-10-CM

## 2022-04-26 DIAGNOSIS — Z23 ENCOUNTER FOR IMMUNIZATION: ICD-10-CM

## 2022-04-26 DIAGNOSIS — K21.9 GASTRO-ESOPHAGEAL REFLUX DISEASE WITHOUT ESOPHAGITIS: ICD-10-CM
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
## 2022-04-26 DIAGNOSIS — Z88.1 ALLERGY STATUS TO OTHER ANTIBIOTIC AGENTS STATUS: ICD-10-CM

## 2022-04-26 PROCEDURE — 90471 IMMUNIZATION ADMIN: CPT

## 2022-04-26 PROCEDURE — 99283 EMERGENCY DEPT VISIT LOW MDM: CPT | Mod: 25

## 2022-04-26 PROCEDURE — 99283 EMERGENCY DEPT VISIT LOW MDM: CPT

## 2022-04-26 PROCEDURE — 90715 TDAP VACCINE 7 YRS/> IM: CPT

## 2022-04-26 RX ORDER — TETANUS TOXOID, REDUCED DIPHTHERIA TOXOID AND ACELLULAR PERTUSSIS VACCINE, ADSORBED 5; 2.5; 8; 8; 2.5 [IU]/.5ML; [IU]/.5ML; UG/.5ML; UG/.5ML; UG/.5ML
0.5 SUSPENSION INTRAMUSCULAR ONCE
Refills: 0 | Status: COMPLETED | OUTPATIENT
Start: 2022-04-26 | End: 2022-04-26

## 2022-04-26 RX ORDER — CEPHALEXIN 500 MG
1 CAPSULE ORAL
Qty: 21 | Refills: 0
Start: 2022-04-26 | End: 2022-05-02

## 2022-04-26 RX ORDER — CEPHALEXIN 500 MG
500 CAPSULE ORAL ONCE
Refills: 0 | Status: COMPLETED | OUTPATIENT
Start: 2022-04-26 | End: 2022-04-26

## 2022-04-26 RX ADMIN — Medication 500 MILLIGRAM(S): at 19:02

## 2022-04-26 RX ADMIN — TETANUS TOXOID, REDUCED DIPHTHERIA TOXOID AND ACELLULAR PERTUSSIS VACCINE, ADSORBED 0.5 MILLILITER(S): 5; 2.5; 8; 8; 2.5 SUSPENSION INTRAMUSCULAR at 18:42

## 2022-04-26 NOTE — ED ADULT TRIAGE NOTE - CHIEF COMPLAINT QUOTE
Pt presents to er with complaints of falling last night into a pile of glass, states she did not realize how bad the injury was and wants further evaluation at this time, neg head strike, use of blood thinners at this time, not upt on tetanus vaccine.

## 2022-04-26 NOTE — ED STATDOCS - ATTENDING APP SHARED VISIT CONTRIBUTION OF CARE
I, Guerrero Mcbride MD,  performed the initial face to face bedside interview with this patient regarding history of present illness, review of symptoms and relevant past medical, social and family history.  I completed an independent physical examination.  I was the initial provider who evaluated this patient.   I personally saw the patient and performed a substantive portion of the visit including all aspects of the medical decision making.  I have signed out the follow up of any pending tests (i.e. labs, radiological studies) to the EWA.  I have communicated the patient’s plan of care and disposition with the EWA.

## 2022-04-26 NOTE — ED STATDOCS - NSICDXPASTMEDICALHX_GEN_ALL_CORE_FT
PAST MEDICAL HISTORY:  Anxiety     Endometriosis     GERD (gastroesophageal reflux disease)     IBS (irritable bowel syndrome)     POTS (postural orthostatic tachycardia syndrome)     Sleep apnea     Vasovagal syncope     Vertigo

## 2022-04-26 NOTE — ED ADULT NURSE NOTE - NSFALLRSKASSESSDT_ED_ALL_ED
Add Ability To Document Additional Intralesional Injection: No
Detail Level: Detailed
Size Of Lesion In Cm: 0.6
Total Volume (Ccs): 1
What Was Performed First?: Curettage
Size Of Lesion After Curettage: 0.8
Consent was obtained from the patient. The risks, benefits and alternatives to therapy were discussed in detail. Specifically, the risks of infection, scarring, bleeding, prolonged wound healing, nerve injury, incomplete removal, allergy to anesthesia and recurrence were addressed. Alternatives to ED&C, such as: surgical removal and XRT were also discussed.  Prior to the procedure, the treatment site was clearly identified and confirmed by the patient. All components of Universal Protocol/PAUSE Rule completed.
Anesthesia Type: 1% lidocaine with epinephrine
Additional Information: (Optional): The wound was cleaned, and a pressure dressing was applied.  The patient received detailed post-op instructions.
Post-Care Instructions: I reviewed with the patient in detail post-care instructions. Patient is to keep the area dry for 48 hours, and not to engage in any swimming until the area is healed. Should the patient develop any fevers, chills, bleeding, severe pain patient will contact the office immediately.
Number Of Curettages: 3
Cautery Type: electrodesiccation
Bill As A Line Item Or As Units: Line Item
Medication Injected: 5-Fluorouracil
26-Apr-2022 18:48

## 2022-04-26 NOTE — ED STATDOCS - PATIENT PORTAL LINK FT
You can access the FollowMyHealth Patient Portal offered by [facility] by registering at the following website: [email or web address]. By joining [employer]’s FollowMyHealth portal, you will also be able to view your health information using other applications (apps) compatible with our system.

## 2022-04-26 NOTE — ED STATDOCS - OBJECTIVE STATEMENT
33 yo pt presents to ED for laceration.  Pt states that she had pet ferrets that knocked over glass onto floor at home.  Pt slipped on material and landed on glass ~ 9pm.  Today pt boy friend looked at wounds and they looked deep and came to ED.  Tetanus not up to date.  No travel, no sick contacts.

## 2022-04-26 NOTE — ED STATDOCS - NS ED ATTENDING STATEMENT MOD
This was a shared visit with the EWA. I reviewed and verified the documentation and independently performed the documented:

## 2022-04-26 NOTE — ED STATDOCS - CLINICAL SUMMARY MEDICAL DECISION MAKING FREE TEXT BOX
33 yo pt s/p slip and fall landing on glass with lac to left buttocks.  Wound ~21 hours old.    Plan wound care.

## 2022-04-26 NOTE — ED ADULT NURSE NOTE - OBJECTIVE STATEMENT
Pt presents to ER with complaints of falling last night into a pile of glass, states she did not realize how bad the injury was until today when her boyfriend was able to look at it for her.

## 2022-04-26 NOTE — ED STATDOCS - PROGRESS NOTE DETAILS
pt presented  with c/c of laceration to left buttocks since last night, pt wound is "V" shaped  .5cm x .5cm partial thickness to mid buttocks on the left, no surrounding erythema induration bleeding or fluctuance, no fb seen, pt wound cleaned with NS and iodine and dressed with tefla and pressure dressing. pt advised to fu with plastics for revision if she wants less scarring. pt knows to take antibiotics as directed and agrees with plan. pt well appearing on dc. -Malika Zaldivar PA-C

## 2022-04-26 NOTE — ED STATDOCS - CARE PROVIDER_API CALL
Fany Cohen)  Plastic Surgery; Surgery  [address] [facility]  [address] [place] [address]  Phone: [phone]  Fax: [phone]  Follow Up Time:

## 2022-07-12 ENCOUNTER — EMERGENCY (EMERGENCY)
Facility: HOSPITAL | Age: 32
LOS: 0 days | Discharge: HOME | End: 2022-07-12
Attending: EMERGENCY MEDICINE
Payer: MEDICAID

## 2022-07-12 VITALS
RESPIRATION RATE: 17 BRPM | DIASTOLIC BLOOD PRESSURE: 85 MMHG | HEIGHT: 60 IN | WEIGHT: 128.09 LBS | HEART RATE: 135 BPM | OXYGEN SATURATION: 95 % | SYSTOLIC BLOOD PRESSURE: 123 MMHG | TEMPERATURE: 99 F

## 2022-07-12 VITALS — HEART RATE: 98 BPM

## 2022-07-12 DIAGNOSIS — F41.9 ANXIETY DISORDER, UNSPECIFIED: ICD-10-CM

## 2022-07-12 DIAGNOSIS — F10.929 ALCOHOL USE, UNSPECIFIED WITH INTOXICATION, UNSPECIFIED: ICD-10-CM

## 2022-07-12 DIAGNOSIS — I49.8 OTHER SPECIFIED CARDIAC ARRHYTHMIAS: ICD-10-CM

## 2022-07-12 DIAGNOSIS — Z88.5 ALLERGY STATUS TO NARCOTIC AGENT: ICD-10-CM

## 2022-07-12 DIAGNOSIS — N80.9 ENDOMETRIOSIS, UNSPECIFIED: ICD-10-CM
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
## 2022-07-12 DIAGNOSIS — Z88.1 ALLERGY STATUS TO OTHER ANTIBIOTIC AGENTS STATUS: ICD-10-CM

## 2022-07-12 DIAGNOSIS — Z88.2 ALLERGY STATUS TO SULFONAMIDES: ICD-10-CM

## 2022-07-12 DIAGNOSIS — R41.82 ALTERED MENTAL STATUS, UNSPECIFIED: ICD-10-CM

## 2022-07-12 DIAGNOSIS — K58.9 IRRITABLE BOWEL SYNDROME WITHOUT DIARRHEA: ICD-10-CM

## 2022-07-12 DIAGNOSIS — G47.30 SLEEP APNEA, UNSPECIFIED: ICD-10-CM

## 2022-07-12 DIAGNOSIS — K21.9 GASTRO-ESOPHAGEAL REFLUX DISEASE WITHOUT ESOPHAGITIS: ICD-10-CM
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
## 2022-07-12 PROCEDURE — 99285 EMERGENCY DEPT VISIT HI MDM: CPT

## 2022-07-12 PROCEDURE — 99284 EMERGENCY DEPT VISIT MOD MDM: CPT

## 2022-07-12 PROCEDURE — 93010 ELECTROCARDIOGRAM REPORT: CPT

## 2022-07-12 PROCEDURE — 93005 ELECTROCARDIOGRAM TRACING: CPT

## 2022-07-12 PROCEDURE — 82962 GLUCOSE BLOOD TEST: CPT

## 2022-07-12 NOTE — ED PROVIDER NOTE - OBJECTIVE STATEMENT
31 y/o female with a PMHx of POTS, sleep apnea, GERD, endometriosis, vertigo, vasovagal syncope, anxiety, IBS presents to the ED with AMS. Pt states that she tripped and stumbled/fell after drinking and bystander noticed. Pt states that she tripped due to having new shoes and not due to too much EtOH. Denies head injury. Pt has no other medical complaints otherwise.

## 2022-07-12 NOTE — ED ADULT NURSE NOTE - NSIMPLEMENTINTERV_GEN_ALL_ED
Implemented All Fall Risk Interventions:  [place] to call system. Call bell, personal items and telephone within reach. Instruct patient to call for assistance. Room bathroom lighting operational. Non-slip footwear when patient is off stretcher. Physically safe environment: no spills, clutter or unnecessary equipment. Stretcher in lowest position, wheels locked, appropriate side rails in place. Provide visual cue, wrist band, yellow gown, etc. Monitor gait and stability. Monitor for mental status changes and reorient to person, place, and time. Review medications for side effects contributing to fall risk. Reinforce activity limits and safety measures with patient and family.

## 2022-07-12 NOTE — ED ADULT TRIAGE NOTE - CHIEF COMPLAINT QUOTE
Patient comes in with altered mental status. Patient endorses having 3 alcoholic beverages. Patient states her head hurts after hitting head on wall during EMS transfer. Awake and alert at triage. .

## 2022-07-12 NOTE — ED PROVIDER NOTE - PATIENT PORTAL LINK FT
You can access the FollowMyHealth Patient Portal offered by [facility] by registering at the following website: [email or web address]. By joining [employer]’s FollowMyHealth portal, you will also be able to view your health information using other applications (apps) compatible with our system.

## 2022-07-12 NOTE — ED PROVIDER NOTE - PROGRESS NOTE DETAILS
Babs Thompson: Attempted to ambulate pt. Pt is still intoxicated. The patient is clinically sober. The patient is alert and oriented x 3, is clear and coherent in conversation and has a normal gait and shows no signs of acute intoxication. The patient is safe for discharge.

## 2022-07-12 NOTE — ED PROVIDER NOTE - PHYSICAL EXAMINATION
GEN - NAD; well appearing, slightly slurred speech; A+O x3   HEAD - NC/AT     EYES - EOMI, no conjunctival pallor, no scleral icterus  ENT -   mucous membranes  moist , no discharge      NECK - Neck supple  PULM - CTA b/l,  symmetric breath sounds  COR -  RRR, S1 S2, no murmurs  ABD - , ND, NT, soft, no guarding, no rebound, no masses    BACK - no CVA tenderness, nontender spine     EXTREMS - no edema, no deformity, warm and well perfused    SKIN - no rash or bruising      NEUROLOGIC - alert, sensation nl, motor 5/5 RUE/LUE/RLE/LLE. Pt has slightly slurred speech.

## 2022-07-17 ENCOUNTER — NON-APPOINTMENT (OUTPATIENT)
Age: 32
End: 2022-07-17

## 2022-07-18 ENCOUNTER — NON-APPOINTMENT (OUTPATIENT)
Age: 32
End: 2022-07-18

## 2022-07-18 ENCOUNTER — LABORATORY RESULT (OUTPATIENT)
Age: 32
End: 2022-07-18

## 2022-07-18 ENCOUNTER — APPOINTMENT (OUTPATIENT)
Dept: CARDIOLOGY | Facility: CLINIC | Age: 32
End: 2022-07-18

## 2022-07-18 VITALS
BODY MASS INDEX: 27.68 KG/M2 | SYSTOLIC BLOOD PRESSURE: 128 MMHG | OXYGEN SATURATION: 94 % | HEIGHT: 60 IN | HEART RATE: 100 BPM | WEIGHT: 141 LBS | DIASTOLIC BLOOD PRESSURE: 87 MMHG

## 2022-07-18 DIAGNOSIS — E78.9 DISORDER OF LIPOPROTEIN METABOLISM, UNSPECIFIED: ICD-10-CM
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
## 2022-07-18 DIAGNOSIS — R55 SYNCOPE AND COLLAPSE: ICD-10-CM

## 2022-07-18 DIAGNOSIS — R06.00 DYSPNEA, UNSPECIFIED: ICD-10-CM

## 2022-07-18 PROCEDURE — 99214 OFFICE O/P EST MOD 30 MIN: CPT | Mod: 25

## 2022-07-18 PROCEDURE — 36415 COLL VENOUS BLD VENIPUNCTURE: CPT

## 2022-07-18 PROCEDURE — 93000 ELECTROCARDIOGRAM COMPLETE: CPT

## 2022-07-18 NOTE — HISTORY OF PRESENT ILLNESS
[FreeTextEntry1] : July, 2013. The patient had a positive tilt test. After about 20 minutes her heart rate went up to 150 and her blood pressure dropped. Her blood pressure then sol. Dr. Alvarez spoke to the patient on the phone on Friday. He advised her to eat more salt and drink more liquids. This morning the patient got out of bed and walk to the kitchen. On the way to the kitchen she blacked out. She still gets a heart rate of 150 with minimal activity.\par \par March 25, 2019. First return visit since above. I believe Dr. Alvarez tried pindolol at one point but unclear if she took it or if it helped. She actually has been doing fairly well with vasovagal episodes mostly just during venipuncture and her panic attacks and anxiety have been under good control. She also claims she has sleep apnea, with 13-17 episodes of apnea per night. She was told it was because she had multiple nasal fractures, most from horseback riding, but she sleeps with her bed elevated and stays on her side and does not seem to have a problem. She has a neurological issues as mentioned and is followed by Dr. Dwight Rosenstein and had seen Dr. Merrill Bowen of neurosurgery (see enclosed notes in [employer]). For some reason over the last, month she's had about 4 episodes of what she calls fainting spells. Sounds like similar to her tilt table response, and they've come on pretty much at random. Once at [employer], once while walking to a restaurant, et cetera. She has enough warning to sit down in the car or on the side of a curb and think she passes out just briefly. She is exhausted afterwards and can feel fatigued for up to 2 hours. (I did do venipuncture myself, monitored her and she seemed to be having some distress, but had strong normal pulse throughout, not tachycardic, etc.) After exam and long discussion we decided to try low-dose beta blocker.\par (Mother also very concerned because the patient's grandmother was Jessica Sotelo, who had multiple severe problems, including antiphospholipid syndrome, treated by Dr. Alvarez. The patient's sister has cystic fibrosis and there are a lot of cardiac issues throughout the family. I reassured them that the patient was too young for coronary artery disease, and that she has already had 2 or 3 normal echos with Dr. Alvarez, most recent being 2013.)\par October 10, 2019.  Patient returns in follow-up complaining of palpitations.  EKG here with sinus rhythm and unchanged and rhythm strip chest shows sinus arrhythmia.  Unclear if the bisoprolol helped her but after 1 month she stopped it because her blood pressure was in the 80s and she felt a little lightheaded and dizzy.  She said she had a same problem with pindolol in the past.  She now has what she calls a new symptom of feeling her heart racing and feeling an absence of air that can be on and off or last all day and at times is continuous.  May be one episode while working out where her heart rate was 160 according to her watch.  She claims she had no symptoms during the rhythm strip here.  She is conscious about maintaining hydration and salt load etc. Fitbit watch did capture 2 episodes one during sleep which is recorded a heart rate of 150 and once during the day of 170.  Discussed multiple options including different medication, EP study, loop recorder, and decided on a 30-day MCOT monitor first.\par November 29, 2019.  Reviewed the patient's 30-day monitor and finally had a chance to speak with her. Does have symptomatic sinus tach occasionally 150-170. Also has some VPCs that are very symptomatic in terms of the post VPC pause. After long discussion with the patient and the fact that she has not tolerated any beta-blockers I am referring her to Dr. Roberto West of EPS for further advice and management. \par December 10, 2019.  Electrophysiology consult by Dr. West. n summary, Claudia Lenz is a 28y/o woman with long standing Hx of palpitations, anxiety/panic attacks (not on medication), mild NELA (no recent sleep study and not on CPAP), syncope and near syncope, and POTS (diagnosed in 2012) who presents today for initial evaluation. Admits recurring episodes of tachycardia up to the 200s with minimal exertion, up to the 240s as per patient with exercise. Unable to tolerate activity due to feeling of near syncope or actual fainting. States her heart will race with standing and gradually return to baseline. Has utilized beta blockers in the past but was unable to tolerate secondary to hypotension. Never attempted midodrine/beta blocker combination. Also experiences dyspnea when her heart races as well as episodes of feeling her heart skip a beat. Underwent Holter monitoring which revealed episodes of sinus tachycardia to the 170s (was not exercising or exerting herself at that time) as well as episodes of isolated PVCs. Has family history of POTS (grandmother) as well as arrhythmias including PVCs (mother) and atrial fibrillation (grandmother). Has done research on POTS and follows the [facility] protocol/guidelines and attempts salt loading and increased hydration with minimal relief. No recent ECHO or stress test. EKG today NSR without evidence of PVCs. Given no recent stress test or ECHO, recommend undergoing both. Continue utilizing salt and staying well hydrated. Review of prior tilt table test revealed evidence of vasovagal syncope as well. Instructed on safety mechanisms such as staying well hydrated, utilizing salt, avoiding prolonged standing with knees locked, and to lie down with feet elevated if symptoms of near syncope occur. Can also consider use of compression socks and should avoid known triggers for syncope such as alcohol and warm or crowded environments.\par January 14, 2020.  Dr. Roberto West.  "Patient's echocardiogram showed normal LV function and Holter showed single PVCs (outflow tract type), 1912 PVCs. We discussed ablation versus antiarrhythmic medication and for now she wanted reassurance that the PVCs posed little risk, given normal LV function." \par April 13, 2020.  In the beginning of March patient had diarrhea and extreme fatigue for about a week and then after that noted initially low-grade temp that very rapidly became high temperature with shortness of breath and she went to [facility] and was treated there for a week for Covid 19 a month ago.  Chest x-ray had bilateral opacities.  She did respond well to therapy and did not need to be in an ICU or intubated.  She was released on March 20 and has been asymptomatic and afebrile since then.  Before she was released they were very concerned about her fluctuating heart rate and her occasional sinus tachycardia which she assured them was her baseline condition as it is.  She still has her usual symptoms and her palpitations have been mostly consistent with VPCs followed by compensatory pause and stronger heartbeat after the VPC.  She is able to manage her palpitations and her heart rate and is not interested in further therapy or ablation at this time.  She came in here because she has shortness of breath when her heart rate is fast which is her baseline I wanted to just make sure she was fine after having had coded 19 over 4 weeks ago.  Her EKG here is sinus rhythm at 90 with one VPC and is otherwise within normal limits.  Her O2 sat was 100% and she was afebrile\par October 6, 2020.  It is now almost 7 months since she had her episode of COVID and seemed to have fully recovered.  Sudden stress with her father finding out he had severe LAD disease and having an acute angioplasty followed by an attempt at ablation for atrial fibrillation that caused a cardiac arrest.  Mother went for a coronary calcium score and was found to be mildly abnormal but also in the proximal LAD.  Now patient with severe fatigue shortness of breath anxiety and is convinced that this is all from post COVID lung or heart disease.  Claims to have remained severely fatigued since she was diagnosed etc.  My initial feeling was to have her hooked up with Dr. Charisse Santos years post COVID program for closer follow-up and monitoring although I now feel that her symptoms are probably not COVID related.  We will have her come back for an echocardiogram just to be sure and labs were sent.\par October 22, 2020.  Patient returns for echo and follow-up.  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  Her echo was totally normal and unchanged from January echo that was pre-Covid.  Symptoms seem to be improved although she still complains of extra beats etc. especially during the night and occasionally wakes her up.\par July 18, 2022.  First visit in over a year and a half.  Has had a recurrence of vertigo episodes and then had 1 syncopal episode that may have been vertigo or may have been vasodepressor.  No real chest pain syndrome.  Otherwise patient has been doing well except had COVID again but a very mild case.  Has had a number of tick bites so wanted Lyme titers tested as well; did not want to think about empiric treatment etc.  Also because of her very strong family history her mother suggested that she have coronary calcium score; I discussed with the patient the pros and cons given her age of 33 years old although her true menopausal status is hard to ascertain because of her oncologic/gynecologic treatment.

## 2022-07-18 NOTE — REASON FOR VISIT
[FreeTextEntry1] : The patient's a 32-year-old woman who presented over 20 years ago with a history of tachycardia and feeling lightheaded. Has positive tilt table, vasovagal episodes, panic attacks, anxiety, sleep apnea, abnormal EEG, pineal gland tumor. Now s/p Covid 19 pneumonia..

## 2022-07-18 NOTE — PHYSICAL EXAM
[General Appearance - Well Developed] : well developed [Normal Appearance] : normal appearance [Well Groomed] : well groomed [General Appearance - Well Nourished] : well nourished [No Deformities] : no deformities [General Appearance - In No Acute Distress] : no acute distress [Normal Conjunctiva] : the conjunctiva exhibited no abnormalities [Eyelids - No Xanthelasma] : the eyelids demonstrated no xanthelasmas [Normal Oral Mucosa] : normal oral mucosa [No Oral Pallor] : no oral pallor [No Oral Cyanosis] : no oral cyanosis [Normal Jugular Venous A Waves Present] : normal jugular venous A waves present [Normal Jugular Venous V Waves Present] : normal jugular venous V waves present [No Jugular Venous Chambers A Waves] : no jugular venous chambers A waves [Respiration, Rhythm And Depth] : normal respiratory rhythm and effort [Exaggerated Use Of Accessory Muscles For Inspiration] : no accessory muscle use [Auscultation Breath Sounds / Voice Sounds] : lungs were clear to auscultation bilaterally [Heart Rate And Rhythm] : heart rate and rhythm were normal [Heart Sounds] : normal S1 and S2 [Murmurs] : no murmurs present [Abdomen Soft] : soft [Abdomen Tenderness] : non-tender [Abdomen Mass (___ Cm)] : no abdominal mass palpated [Abnormal Walk] : normal gait [Gait - Sufficient For Exercise Testing] : the gait was sufficient for exercise testing [Nail Clubbing] : no clubbing of the fingernails [Cyanosis, Localized] : no localized cyanosis [Petechial Hemorrhages (___cm)] : no petechial hemorrhages [Skin Color & Pigmentation] : normal skin color and pigmentation [] : no rash [No Venous Stasis] : no venous stasis [Skin Lesions] : no skin lesions [No Skin Ulcers] : no skin ulcer [No Xanthoma] : no  xanthoma was observed [Oriented To Time, Place, And Person] : oriented to person, place, and time [Affect] : the affect was normal [Mood] : the mood was normal [FreeTextEntry1] : Somewhat anxious but less than recent

## 2022-07-18 NOTE — REVIEW OF SYSTEMS
[Chest Discomfort] : chest discomfort [Syncope] : syncope [Dizziness] : dizziness [Negative] : Heme/Lymph [FreeTextEntry2] : tick bites [de-identified] : No rashes

## 2022-07-19 LAB
ALBUMIN SERPL ELPH-MCNC: 4.2 G/DL
ALP BLD-CCNC: 52 U/L
ALT SERPL-CCNC: 38 U/L
ANION GAP SERPL CALC-SCNC: 15 MMOL/L
AST SERPL-CCNC: 53 U/L
BASOPHILS # BLD AUTO: 0.07 K/UL
BASOPHILS NFR BLD AUTO: 0.8 %
BILIRUB SERPL-MCNC: 0.4 MG/DL
BUN SERPL-MCNC: 4 MG/DL
CALCIUM SERPL-MCNC: 10.1 MG/DL
CHLORIDE SERPL-SCNC: 102 MMOL/L
CHOLEST SERPL-MCNC: 132 MG/DL
CO2 SERPL-SCNC: 22 MMOL/L
CREAT SERPL-MCNC: 0.57 MG/DL
EGFR: 123 ML/MIN/1.73M2
EOSINOPHIL # BLD AUTO: 0.05 K/UL
EOSINOPHIL NFR BLD AUTO: 0.6 %
ESTIMATED AVERAGE GLUCOSE: 94 MG/DL
GLUCOSE SERPL-MCNC: 105 MG/DL
HBA1C MFR BLD HPLC: 4.9 %
HCT VFR BLD CALC: 38.4 %
HDLC SERPL-MCNC: 107 MG/DL
HGB BLD-MCNC: 12.2 G/DL
IMM GRANULOCYTES NFR BLD AUTO: 0.2 %
LDLC SERPL CALC-MCNC: 16 MG/DL
LYMPHOCYTES # BLD AUTO: 2.41 K/UL
LYMPHOCYTES NFR BLD AUTO: 26.8 %
MAN DIFF?: NORMAL
MCHC RBC-ENTMCNC: 31.8 GM/DL
MCHC RBC-ENTMCNC: 32.3 PG
MCV RBC AUTO: 101.6 FL
MONOCYTES # BLD AUTO: 0.62 K/UL
MONOCYTES NFR BLD AUTO: 6.9 %
NEUTROPHILS # BLD AUTO: 5.81 K/UL
NEUTROPHILS NFR BLD AUTO: 64.7 %
NONHDLC SERPL-MCNC: 25 MG/DL
NT-PROBNP SERPL-MCNC: 162 PG/ML
PLATELET # BLD AUTO: 292 K/UL
POTASSIUM SERPL-SCNC: 4.1 MMOL/L
PROT SERPL-MCNC: 6.5 G/DL
RBC # BLD: 3.78 M/UL
RBC # FLD: 14.2 %
SODIUM SERPL-SCNC: 138 MMOL/L
TRIGL SERPL-MCNC: 43 MG/DL
TSH SERPL-ACNC: 2.56 UIU/ML
WBC # FLD AUTO: 8.98 K/UL

## 2023-01-01 NOTE — ED STATDOCS - CPE ED ENMT NORM
normal... Right ear hearing screen completed date: 2023  Right ear screen method: EOAE (evoked otoacoustic emission)  Right ear screen result: Passed  Right ear screen comment: N/A    Left ear hearing screen completed date: 2023  Left ear screen method: EOAE (evoked otoacoustic emission)  Left ear screen result: Passed  Left ear screen comments: N/A

## 2023-03-22 ENCOUNTER — APPOINTMENT (OUTPATIENT)
Dept: NEUROLOGY | Facility: CLINIC | Age: 33
End: 2023-03-22

## 2023-03-24 NOTE — ED ADULT NURSE NOTE - ISOLATION INDICATION AIRBORNE CONTACT
Other Specify Hemigard Intro: Due to skin fragility and wound tension, it was decided to use HEMIGARD adhesive retention suture devices to permit a linear closure. The skin was cleaned and dried for a 6cm distance away from the wound. Excessive hair, if present, was removed to allow for adhesion.

## 2023-03-25 ENCOUNTER — EMERGENCY (EMERGENCY)
Facility: HOSPITAL | Age: 33
LOS: 0 days | Discharge: HOME | End: 2023-03-25
Attending: EMERGENCY MEDICINE
Payer: MEDICAID

## 2023-03-25 VITALS
DIASTOLIC BLOOD PRESSURE: 89 MMHG | SYSTOLIC BLOOD PRESSURE: 128 MMHG | HEIGHT: 61 IN | HEART RATE: 86 BPM | RESPIRATION RATE: 18 BRPM | OXYGEN SATURATION: 100 % | TEMPERATURE: 98 F | WEIGHT: 139.99 LBS

## 2023-03-25 DIAGNOSIS — F10.129 ALCOHOL ABUSE WITH INTOXICATION, UNSPECIFIED: ICD-10-CM

## 2023-03-25 DIAGNOSIS — K21.9 GASTRO-ESOPHAGEAL REFLUX DISEASE WITHOUT ESOPHAGITIS: ICD-10-CM

## 2023-03-25 DIAGNOSIS — Z87.19 PERSONAL HISTORY OF OTHER DISEASES OF THE DIGESTIVE SYSTEM: ICD-10-CM

## 2023-03-25 DIAGNOSIS — N80.9 ENDOMETRIOSIS, UNSPECIFIED: ICD-10-CM

## 2023-03-25 DIAGNOSIS — Z88.1 ALLERGY STATUS TO OTHER ANTIBIOTIC AGENTS STATUS: ICD-10-CM

## 2023-03-25 DIAGNOSIS — R41.82 ALTERED MENTAL STATUS, UNSPECIFIED: ICD-10-CM

## 2023-03-25 DIAGNOSIS — F41.9 ANXIETY DISORDER, UNSPECIFIED: ICD-10-CM

## 2023-03-25 DIAGNOSIS — Z88.8 ALLERGY STATUS TO OTHER DRUGS, MEDICAMENTS AND BIOLOGICAL SUBSTANCES STATUS: ICD-10-CM

## 2023-03-25 DIAGNOSIS — Z88.2 ALLERGY STATUS TO SULFONAMIDES: ICD-10-CM

## 2023-03-25 DIAGNOSIS — Z88.5 ALLERGY STATUS TO NARCOTIC AGENT: ICD-10-CM

## 2023-03-25 PROCEDURE — 99285 EMERGENCY DEPT VISIT HI MDM: CPT

## 2023-03-25 PROCEDURE — 99284 EMERGENCY DEPT VISIT MOD MDM: CPT

## 2023-03-25 NOTE — ED PROVIDER NOTE - CLINICAL SUMMARY MEDICAL DECISION MAKING FREE TEXT BOX
patient admits to drinking alcohol earlier today.  The patient is in no apparent distress not display agitation.  The patient denies any SI or HI.  The patient is ambulatory in the emergency department without assistance and states that her boyfriend is on his way to give her a safe ride home.

## 2023-03-25 NOTE — ED ADULT TRIAGE NOTE - CHIEF COMPLAINT QUOTE
patient brought in by EMS and [employer].  per EMS/PD patient was drinking this even and got in a verbal altercation with her mom who called [employer].  per PD patient ran into the woods when she saw the police.  per PD patient was not aggressive prior to arrival and per family no SI/HI statements were made.  patient is A&Ox3 admits to drinking this evening and admits to drinking often.  patient states she ran from police beacsue she was scared.  patient denies HI/SI.

## 2023-03-25 NOTE — ED PROVIDER NOTE - PHYSICAL EXAMINATION
CONSTITUTIONAL: Well appearing, awake, alert, oriented to person, place, time/situation and in no apparent distress.  · ENMT: Airway patent, Nasal mucosa clear. Mouth with normal mucosa. Throat has no vesicles, no oropharyngeal exudates and uvula is midline.  · EYES: Clear bilaterally, pupils equal, round and reactive to light.  · CARDIAC: Normal rate, regular rhythm.  Heart sounds S1, S2.  No murmurs, rubs or gallops.  · RESPIRATORY: Breath sounds clear and equal bilaterally.  · GASTROINTESTINAL: Abdomen soft, non-tender, no guarding.  · MUSCULOSKELETAL: Spine appears normal, range of motion is not limited, no muscle or joint tenderness  · NEUROLOGICAL: Alert and oriented, no focal deficits, no motor or sensory deficits.  · SKIN: Skin normal color for race, warm, dry and intact. No evidence of rash    Patient ambulating without assistance in the emergency department.

## 2023-03-25 NOTE — ED ADULT NURSE REASSESSMENT NOTE - NS ED NURSE REASSESS COMMENT FT1
pt seen and evaluated by MD Edward - deemed safe for discharge. educated on discharge instructions no further complaints at this time

## 2023-03-25 NOTE — ED PROVIDER NOTE - OBJECTIVE STATEMENT
33-year-old female with history listed below brought in by EMS for evaluation alcohol intoxication after she was in a verbal altercation with her mother who came to her house that she lives in by herself  Unannounced.  Patient denies any physical altercation, SI or HI.  Patient admits to drinking some wine earlier in the day and states that she was in the process of going to sleep when her mother came over.  Her mother stated that she was going to call the police and patient left the house and went over to the neighbor's house by EMS and police arrived.  Patient denies any complaints at this time and denies any trauma.

## 2023-03-25 NOTE — ED PROVIDER NOTE - PATIENT PORTAL LINK FT
COPD on home O2 2L, patient with difficulty breathing worsening throughout the day You can access the FollowMyHealth Patient Portal offered by [facility] by registering at the following website: [email or web address]. By joining [employer]’s FollowMyHealth portal, you will also be able to view your health information using other applications (apps) compatible with our system.

## 2023-03-28 ENCOUNTER — LABORATORY RESULT (OUTPATIENT)
Age: 33
End: 2023-03-28

## 2023-03-28 ENCOUNTER — APPOINTMENT (OUTPATIENT)
Dept: NEUROLOGY | Facility: CLINIC | Age: 33
End: 2023-03-28
Payer: MEDICAID

## 2023-03-28 ENCOUNTER — NON-APPOINTMENT (OUTPATIENT)
Age: 33
End: 2023-03-28

## 2023-03-28 VITALS
WEIGHT: 141 LBS | HEIGHT: 60 IN | DIASTOLIC BLOOD PRESSURE: 92 MMHG | HEART RATE: 82 BPM | BODY MASS INDEX: 27.68 KG/M2 | SYSTOLIC BLOOD PRESSURE: 133 MMHG

## 2023-03-28 DIAGNOSIS — F41.0 PANIC DISORDER [EPISODIC PAROXYSMAL ANXIETY]: ICD-10-CM

## 2023-03-28 DIAGNOSIS — F32.A DEPRESSION, UNSPECIFIED: ICD-10-CM

## 2023-03-28 DIAGNOSIS — F10.10 ALCOHOL ABUSE, UNCOMPLICATED: ICD-10-CM

## 2023-03-28 DIAGNOSIS — D36.9 BENIGN NEOPLASM, UNSPECIFIED SITE: ICD-10-CM

## 2023-03-28 LAB
BASOPHILS # BLD AUTO: 0.08 K/UL
BASOPHILS NFR BLD AUTO: 1.5 %
EOSINOPHIL # BLD AUTO: 0.19 K/UL
EOSINOPHIL NFR BLD AUTO: 3.6 %
HCT VFR BLD CALC: 44.3 %
HGB BLD-MCNC: 14 G/DL
IMM GRANULOCYTES NFR BLD AUTO: 0.2 %
LYMPHOCYTES # BLD AUTO: 2.96 K/UL
LYMPHOCYTES NFR BLD AUTO: 56.1 %
MAN DIFF?: NORMAL
MCHC RBC-ENTMCNC: 31.3 PG
MCHC RBC-ENTMCNC: 31.6 GM/DL
MCV RBC AUTO: 98.9 FL
MONOCYTES # BLD AUTO: 0.4 K/UL
MONOCYTES NFR BLD AUTO: 7.6 %
NEUTROPHILS # BLD AUTO: 1.64 K/UL
NEUTROPHILS NFR BLD AUTO: 31 %
PLATELET # BLD AUTO: 379 K/UL
RBC # BLD: 4.48 M/UL
RBC # FLD: 13.6 %
WBC # FLD AUTO: 5.28 K/UL

## 2023-03-28 PROCEDURE — 99204 OFFICE O/P NEW MOD 45 MIN: CPT

## 2023-03-28 NOTE — ASSESSMENT
[FreeTextEntry1] : Ms. Lenz is a 33-year-old with a long history of multiple somatic complaints.  She has a history of an ovarian teratoma.  There is a vague history of possible epilepsy.  She has a long history of psychiatric symptoms and binge drinking.  She is concerned that she may suffer from autoimmune encephalitis specifically NMDA receptor type.\par \par I suggested that she undergo an updated MRI of the brain with and without contrast, a 24-hour EEG and a comprehensive serologic evaluation.  She will be referred for psychiatric evaluation.  Further management will depend upon these results and her clinical course.

## 2023-03-28 NOTE — CONSULT LETTER
[Dear  ___] : Dear  [unfilled], [Consult Letter:] : I had the pleasure of evaluating your patient, [unfilled]. [Please see my note below.] : Please see my note below. [Consult Closing:] : Thank you very much for allowing me to participate in the care of this patient.  If you have any questions, please do not hesitate to contact me. [Sincerely,] : Sincerely, [FreeTextEntry3] : Ry Silva MD\par

## 2023-03-28 NOTE — PHYSICAL EXAM
[FreeTextEntry1] : Constitutional:  Patient was well-developed, well-nourished and in no acute distress. \par \par Head:  Normocephalic, atraumatic. Tympanic membranes were clear. \par \par Neck:  Supple with full range of motion. \par \par Cardiovascular:  Cardiac rhythm was regular without murmur. There were no carotid bruits. Peripheral pulses were full and symmetric. \par \par Respiratory:  Lungs were clear. \par \par Abdomen:  Soft and nontender. \par \par Spine:  Nontender. \par \par Skin:  There were no rashes. \par \par NEUROLOGICAL EXAMINATION:\par \par Mental Status: Patient was alert and oriented. Speech was fluent. There was no dysarthria.  She scored 26 out of 30 on MMSE.  She reversed 2 letters spelling world backwards and recalled 2 of 3 words.  She had difficulty writing a complete sentence\par \par Cranial Nerves: \par \par II: Visual acuity was 20/25-1 in the right eye and 20/25- 2 in the left eye with near card. Pupils were dilated and sluggishly reactive. Visual fields were full. Funduscopic examination was normal. \par \par III, IV, VI:  Eye movements were full without nystagmus. \par \par V: Facial sensation was intact. \par \par VII: Facial strength was normal. \par \par VIII: Hearing was equal. \par \par IX, X: Palatal movement was normal. Phonation was normal. \par \par XI: Sternocleidomastoids and trapezii were normal. \par \par XII: Tongue was midline and movements normal. There was no lingual atrophy or fasciculations. \par \par Motor Examination: Muscle bulk, tone and strength were normal. \par \par Sensory Examination: Pinprick, vibration and joint position sense were intact. \par \par Reflexes: DTRs were 2+ throughout. \par \par Plantar Responses: Plantar responses were flexor. \par \par Coordination/Cerebellar Function: There was no dysmetria on finger to nose or heel to shin testing. \par \par Gait/Stance: Gait and tandem were normal. Romberg was negative.\par

## 2023-03-28 NOTE — REVIEW OF SYSTEMS
[Dizziness] : dizziness [Anxiety] : anxiety [Depression] : depression [Emotional Problems] : emotional problems [Negative] : Heme/Lymph

## 2023-03-28 NOTE — HISTORY OF PRESENT ILLNESS
[FreeTextEntry1] : Ms. Claudia Lenz is a 33-year-old right-handed patient who was self-referred for neurologic evaluation.  She is accompanied by her uncle Baljit Lenz.  She does not have a primary care physician.  She is under the care of a psychiatric nurse practitioner Enma Wiley.\par \par She has a long history of abdominal symptoms.  At age 21, she was diagnosed with seizures which were precipitated by flashing lights.  She experienced nausea, lightheadedness and vertigo.  She was evaluated by Dr. Cheri Freedman.  She underwent an MRI of the brain and a 7-day EEG which was abnormal.  She was treated with gabapentin for a month.  It was discontinued and she was told to avoid flashing lights. \par \par In 2012, she underwent resection of a ovarian teratoma.\par \par She has a long history of depression and panic symptoms.  She describes social anxiety and short temperedness. She is being treated with sertraline 50 mg daily and diazepam 5 mg twice a day.  She also admits to binge drinking.  She is unemployed.  She worked as an  but was let go during the pandemic a year ago.\par \par Many years ago, she experienced perioral dyskinetic movements.  She denied use of antipsychotics.  At age 2, she experienced motor tics which occurred after having a high fever.  These resolved.\par \par Her last MRI of the brain performed in 2019 was unremarkable.\par \par Past surgical history is notable for resection of ovarian teratoma and breast augmentation.  She contracted COVID-19 in March 2020 but did not require intubation.  She suffers from GERD, recurrent abdominal pain and diarrhea.  There is no history of hypertension, diabetes, hyperlipidemia, cardiac, pulmonary, renal, hepatic, thyroid, hematologic disease, stroke or migraine.  She has no allergies.  Medications include sertraline, diazepam, Junel and vitamins.  She is a non-smoker but binge drinks.  She denies use of recreational drugs.  She is single and unemployed.  Family history is notable for mother with hypertension, a father with Crohn's disease and prostate problems and a sister with cystic fibrosis, diabetes and OCD.

## 2023-03-29 ENCOUNTER — LABORATORY RESULT (OUTPATIENT)
Age: 33
End: 2023-03-29

## 2023-03-29 DIAGNOSIS — R74.01 ELEVATION OF LEVELS OF LIVER TRANSAMINASE LEVELS: ICD-10-CM

## 2023-03-29 LAB
ALBUMIN SERPL ELPH-MCNC: 4.6 G/DL
ALP BLD-CCNC: 72 U/L
ALT SERPL-CCNC: 57 U/L
ANION GAP SERPL CALC-SCNC: 18 MMOL/L
AST SERPL-CCNC: 108 U/L
BILIRUB SERPL-MCNC: 0.5 MG/DL
BUN SERPL-MCNC: 13 MG/DL
CALCIUM SERPL-MCNC: 9.5 MG/DL
CCP AB SER IA-ACNC: <8 UNITS
CHLORIDE SERPL-SCNC: 104 MMOL/L
CO2 SERPL-SCNC: 20 MMOL/L
CREAT SERPL-MCNC: 0.7 MG/DL
CRP SERPL-MCNC: <3 MG/L
EGFR: 116 ML/MIN/1.73M2
ERYTHROCYTE [SEDIMENTATION RATE] IN BLOOD BY WESTERGREN METHOD: 4 MM/HR
ESTIMATED AVERAGE GLUCOSE: 97 MG/DL
FOLATE SERPL-MCNC: 2.6 NG/ML
GLUCOSE SERPL-MCNC: 83 MG/DL
HBA1C MFR BLD HPLC: 5 %
HCYS SERPL-MCNC: 40.9 UMOL/L
HIV1+2 AB SPEC QL IA.RAPID: NONREACTIVE
POTASSIUM SERPL-SCNC: 3.8 MMOL/L
PROT SERPL-MCNC: 7.3 G/DL
RF+CCP IGG SER-IMP: NEGATIVE
RHEUMATOID FACT SER QL: 11 IU/ML
SODIUM SERPL-SCNC: 143 MMOL/L
T PALLIDUM AB SER QL IA: NEGATIVE
TSH SERPL-ACNC: 2.43 UIU/ML
VIT B12 SERPL-MCNC: 516 PG/ML

## 2023-03-30 ENCOUNTER — APPOINTMENT (OUTPATIENT)
Dept: NEUROLOGY | Facility: CLINIC | Age: 33
End: 2023-03-30

## 2023-03-30 LAB
ANA PAT FLD IF-IMP: NORMAL
ANACR T: ABNORMAL
HAV IGM SER QL: NONREACTIVE
HBV CORE IGM SER QL: NONREACTIVE
HBV SURFACE AG SER QL: NONREACTIVE
HCV AB SER QL: NONREACTIVE
HCV S/CO RATIO: 0.1 S/CO
THYROGLOB AB SERPL-ACNC: <20 IU/ML
THYROPEROXIDASE AB SERPL IA-ACNC: <10 IU/ML

## 2023-03-31 DIAGNOSIS — E72.11 HOMOCYSTINURIA: ICD-10-CM

## 2023-03-31 LAB — METHYLMALONATE SERPL-SCNC: 69 NMOL/L

## 2023-03-31 RX ORDER — FOLIC ACID 1 MG/1
1 TABLET ORAL DAILY
Qty: 90 | Refills: 0 | Status: ACTIVE | COMMUNITY
Start: 2023-03-31 | End: 1900-01-01

## 2023-04-01 LAB
ALBUMIN MFR SERPL ELPH: 54.3 %
ALBUMIN SERPL-MCNC: 4 G/DL
ALBUMIN/GLOB SERPL: 1.2 RATIO
ALPHA1 GLOB MFR SERPL ELPH: 6.2 %
ALPHA1 GLOB SERPL ELPH-MCNC: 0.5 G/DL
ALPHA2 GLOB MFR SERPL ELPH: 11.6 %
ALPHA2 GLOB SERPL ELPH-MCNC: 0.8 G/DL
B-GLOBULIN MFR SERPL ELPH: 13.7 %
B-GLOBULIN SERPL ELPH-MCNC: 1 G/DL
DEPRECATED KAPPA LC FREE/LAMBDA SER: 0.84 RATIO
GAMMA GLOB FLD ELPH-MCNC: 1 G/DL
GAMMA GLOB MFR SERPL ELPH: 14.2 %
IGA SER QL IEP: 311 MG/DL
IGG SER QL IEP: 946 MG/DL
IGM SER QL IEP: 197 MG/DL
INTERPRETATION SERPL IEP-IMP: NORMAL
KAPPA LC CSF-MCNC: 2.19 MG/DL
KAPPA LC SERPL-MCNC: 1.83 MG/DL
M PROTEIN SPEC IFE-MCNC: NORMAL
PROT SERPL-MCNC: 7.3 G/DL
PROT SERPL-MCNC: 7.3 G/DL

## 2023-04-03 LAB — PCA AB SER QL IF: NORMAL

## 2023-04-04 ENCOUNTER — NON-APPOINTMENT (OUTPATIENT)
Age: 33
End: 2023-04-04

## 2023-04-04 LAB
HLX MTHFR FINAL REPORT: NORMAL
IGA 24H UR QL IFE: NORMAL

## 2023-04-05 ENCOUNTER — APPOINTMENT (OUTPATIENT)
Dept: NEUROLOGY | Facility: CLINIC | Age: 33
End: 2023-04-05
Payer: MEDICAID

## 2023-04-05 PROCEDURE — 95816 EEG AWAKE AND DROWSY: CPT

## 2023-04-06 PROCEDURE — 95708 EEG WO VID EA 12-26HR UNMNTR: CPT

## 2023-04-06 PROCEDURE — 95700 EEG CONT REC W/VID EEG TECH: CPT

## 2023-04-06 PROCEDURE — 95719 EEG PHYS/QHP EA INCR W/O VID: CPT

## 2023-04-11 ENCOUNTER — TRANSCRIPTION ENCOUNTER (OUTPATIENT)
Age: 33
End: 2023-04-11

## 2023-04-13 LAB
AMPA-R ABCBA: NEGATIVE
AMPHIPHYSIN IGG TITR SER IF: NEGATIVE
CASPR2-IGG CBA, S: NEGATIVE
CV2 IGG TITR SER: NEGATIVE
GABA-B ABCBA: NEGATIVE
GAD65 AB SER-MCNC: 0 NMOL/L
GLIAL NUC TYPE 1 AB TITR SER: NEGATIVE
HU1 AB TITR SER: NEGATIVE
HU2 AB TITR SER IF: NEGATIVE
HU3 AB TITR SER: NEGATIVE
IGLON5 IFA, S: NEGATIVE
IMMUNOLOGIST REVIEW: NORMAL
LGI1-IGG CBA, S: NEGATIVE
NIF IFA, S: NEGATIVE
PCA-1 AB TITR SER: NEGATIVE
PCA-2 AB TITR SER: NEGATIVE
PCA-TR AB TITR SER: NEGATIVE

## 2023-04-13 NOTE — ED STATDOCS - CPE ED GASTRO NORM

## 2023-04-18 ENCOUNTER — APPOINTMENT (OUTPATIENT)
Dept: MRI IMAGING | Facility: CLINIC | Age: 33
End: 2023-04-18

## 2023-04-18 ENCOUNTER — INPATIENT (INPATIENT)
Facility: HOSPITAL | Age: 33
LOS: 5 days | Discharge: HOME | End: 2023-04-24
Attending: INTERNAL MEDICINE | Admitting: INTERNAL MEDICINE
Payer: MEDICAID

## 2023-04-18 ENCOUNTER — NON-APPOINTMENT (OUTPATIENT)
Age: 33
End: 2023-04-18

## 2023-04-18 VITALS — HEIGHT: 61 IN | WEIGHT: 139.99 LBS
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
## 2023-04-18 DIAGNOSIS — E83.39 OTHER DISORDERS OF PHOSPHORUS METABOLISM: ICD-10-CM

## 2023-04-18 DIAGNOSIS — E87.6 HYPOKALEMIA: ICD-10-CM

## 2023-04-18 DIAGNOSIS — E86.0 DEHYDRATION: ICD-10-CM

## 2023-04-18 DIAGNOSIS — G47.30 SLEEP APNEA, UNSPECIFIED: ICD-10-CM

## 2023-04-18 DIAGNOSIS — K21.9 GASTRO-ESOPHAGEAL REFLUX DISEASE WITHOUT ESOPHAGITIS: ICD-10-CM

## 2023-04-18 DIAGNOSIS — F10.129 ALCOHOL ABUSE WITH INTOXICATION, UNSPECIFIED: ICD-10-CM

## 2023-04-18 DIAGNOSIS — Z88.5 ALLERGY STATUS TO NARCOTIC AGENT: ICD-10-CM

## 2023-04-18 DIAGNOSIS — E87.1 HYPO-OSMOLALITY AND HYPONATREMIA: ICD-10-CM

## 2023-04-18 DIAGNOSIS — K58.9 IRRITABLE BOWEL SYNDROME WITHOUT DIARRHEA: ICD-10-CM

## 2023-04-18 DIAGNOSIS — Z88.2 ALLERGY STATUS TO SULFONAMIDES: ICD-10-CM

## 2023-04-18 DIAGNOSIS — F10.139 ALCOHOL ABUSE WITH WITHDRAWAL, UNSPECIFIED: ICD-10-CM

## 2023-04-18 DIAGNOSIS — F41.9 ANXIETY DISORDER, UNSPECIFIED: ICD-10-CM

## 2023-04-18 DIAGNOSIS — Z28.310 UNVACCINATED FOR COVID-19: ICD-10-CM

## 2023-04-18 DIAGNOSIS — Z88.1 ALLERGY STATUS TO OTHER ANTIBIOTIC AGENTS STATUS: ICD-10-CM

## 2023-04-18 LAB
ALBUMIN SERPL ELPH-MCNC: 3.3 G/DL — SIGNIFICANT CHANGE UP (ref 3.3–5)
ALP SERPL-CCNC: 72 U/L — SIGNIFICANT CHANGE UP (ref 40–120)
ALT FLD-CCNC: 129 U/L — HIGH (ref 12–78)
AMPHET UR-MCNC: NEGATIVE — SIGNIFICANT CHANGE UP
ANION GAP SERPL CALC-SCNC: 10 MMOL/L — SIGNIFICANT CHANGE UP (ref 5–17)
APAP SERPL-MCNC: <2 UG/ML — LOW (ref 10–30)
APPEARANCE UR: CLEAR — SIGNIFICANT CHANGE UP
AST SERPL-CCNC: 266 U/L — HIGH (ref 15–37)
BACTERIA # UR AUTO: ABNORMAL
BARBITURATES UR SCN-MCNC: NEGATIVE — SIGNIFICANT CHANGE UP
BASOPHILS # BLD AUTO: 0.07 K/UL — SIGNIFICANT CHANGE UP (ref 0–0.2)
BASOPHILS NFR BLD AUTO: 1.1 % — SIGNIFICANT CHANGE UP (ref 0–2)
BENZODIAZ UR-MCNC: POSITIVE — SIGNIFICANT CHANGE UP
BILIRUB SERPL-MCNC: 0.3 MG/DL — SIGNIFICANT CHANGE UP (ref 0.2–1.2)
BILIRUB UR-MCNC: NEGATIVE — SIGNIFICANT CHANGE UP
BUN SERPL-MCNC: 7 MG/DL — SIGNIFICANT CHANGE UP (ref 7–23)
CALCIUM SERPL-MCNC: 8.9 MG/DL — SIGNIFICANT CHANGE UP (ref 8.5–10.1)
CHLORIDE SERPL-SCNC: 110 MMOL/L — HIGH (ref 96–108)
CO2 SERPL-SCNC: 22 MMOL/L — SIGNIFICANT CHANGE UP (ref 22–31)
COCAINE METAB.OTHER UR-MCNC: NEGATIVE — SIGNIFICANT CHANGE UP
COLOR SPEC: YELLOW — SIGNIFICANT CHANGE UP
CREAT SERPL-MCNC: 0.59 MG/DL — SIGNIFICANT CHANGE UP (ref 0.5–1.3)
DIFF PNL FLD: ABNORMAL
EGFR: 121 ML/MIN/1.73M2 — SIGNIFICANT CHANGE UP
EOSINOPHIL # BLD AUTO: 0.05 K/UL — SIGNIFICANT CHANGE UP (ref 0–0.5)
EOSINOPHIL NFR BLD AUTO: 0.8 % — SIGNIFICANT CHANGE UP (ref 0–6)
EPI CELLS # UR: SIGNIFICANT CHANGE UP
ETHANOL SERPL-MCNC: 445 MG/DL — HIGH (ref 0–10)
GLUCOSE SERPL-MCNC: 85 MG/DL — SIGNIFICANT CHANGE UP (ref 70–99)
GLUCOSE UR QL: NEGATIVE — SIGNIFICANT CHANGE UP
HCG SERPL-ACNC: <1 MIU/ML — SIGNIFICANT CHANGE UP
HCT VFR BLD CALC: 40.1 % — SIGNIFICANT CHANGE UP (ref 34.5–45)
HGB BLD-MCNC: 13.4 G/DL — SIGNIFICANT CHANGE UP (ref 11.5–15.5)
IMM GRANULOCYTES NFR BLD AUTO: 0.2 % — SIGNIFICANT CHANGE UP (ref 0–0.9)
KETONES UR-MCNC: ABNORMAL
LEUKOCYTE ESTERASE UR-ACNC: NEGATIVE — SIGNIFICANT CHANGE UP
LYMPHOCYTES # BLD AUTO: 2.35 K/UL — SIGNIFICANT CHANGE UP (ref 1–3.3)
LYMPHOCYTES # BLD AUTO: 37.5 % — SIGNIFICANT CHANGE UP (ref 13–44)
MCHC RBC-ENTMCNC: 31.8 PG — SIGNIFICANT CHANGE UP (ref 27–34)
MCHC RBC-ENTMCNC: 33.4 GM/DL — SIGNIFICANT CHANGE UP (ref 32–36)
MCV RBC AUTO: 95.2 FL — SIGNIFICANT CHANGE UP (ref 80–100)
METHADONE UR-MCNC: NEGATIVE — SIGNIFICANT CHANGE UP
MONOCYTES # BLD AUTO: 0.27 K/UL — SIGNIFICANT CHANGE UP (ref 0–0.9)
MONOCYTES NFR BLD AUTO: 4.3 % — SIGNIFICANT CHANGE UP (ref 2–14)
NEUTROPHILS # BLD AUTO: 3.51 K/UL — SIGNIFICANT CHANGE UP (ref 1.8–7.4)
NEUTROPHILS NFR BLD AUTO: 56.1 % — SIGNIFICANT CHANGE UP (ref 43–77)
NITRITE UR-MCNC: NEGATIVE — SIGNIFICANT CHANGE UP
OPIATES UR-MCNC: NEGATIVE — SIGNIFICANT CHANGE UP
PCP SPEC-MCNC: SIGNIFICANT CHANGE UP
PCP UR-MCNC: NEGATIVE — SIGNIFICANT CHANGE UP
PH UR: 6 — SIGNIFICANT CHANGE UP (ref 5–8)
PLATELET # BLD AUTO: 403 K/UL — HIGH (ref 150–400)
POTASSIUM SERPL-MCNC: 4.4 MMOL/L — SIGNIFICANT CHANGE UP (ref 3.5–5.3)
POTASSIUM SERPL-SCNC: 4.4 MMOL/L — SIGNIFICANT CHANGE UP (ref 3.5–5.3)
PROT SERPL-MCNC: 7.2 GM/DL — SIGNIFICANT CHANGE UP (ref 6–8.3)
PROT UR-MCNC: NEGATIVE — SIGNIFICANT CHANGE UP
RBC # BLD: 4.21 M/UL — SIGNIFICANT CHANGE UP (ref 3.8–5.2)
RBC # FLD: 14.3 % — SIGNIFICANT CHANGE UP (ref 10.3–14.5)
RBC CASTS # UR COMP ASSIST: SIGNIFICANT CHANGE UP /HPF (ref 0–4)
SALICYLATES SERPL-MCNC: <1.7 MG/DL — LOW (ref 2.8–20)
SARS-COV-2 RNA SPEC QL NAA+PROBE: SIGNIFICANT CHANGE UP
SODIUM SERPL-SCNC: 142 MMOL/L — SIGNIFICANT CHANGE UP (ref 135–145)
SP GR SPEC: 1.01 — SIGNIFICANT CHANGE UP (ref 1.01–1.02)
THC UR QL: NEGATIVE — SIGNIFICANT CHANGE UP
UROBILINOGEN FLD QL: NEGATIVE — SIGNIFICANT CHANGE UP
WBC # BLD: 6.26 K/UL — SIGNIFICANT CHANGE UP (ref 3.8–10.5)
WBC # FLD AUTO: 6.26 K/UL — SIGNIFICANT CHANGE UP (ref 3.8–10.5)
WBC UR QL: SIGNIFICANT CHANGE UP /HPF (ref 0–5)

## 2023-04-18 PROCEDURE — 99285 EMERGENCY DEPT VISIT HI MDM: CPT

## 2023-04-18 RX ORDER — SODIUM CHLORIDE 9 MG/ML
1000 INJECTION INTRAMUSCULAR; INTRAVENOUS; SUBCUTANEOUS ONCE
Refills: 0 | Status: COMPLETED | OUTPATIENT
Start: 2023-04-18 | End: 2023-04-18

## 2023-04-18 RX ADMIN — SODIUM CHLORIDE 1000 MILLILITER(S): 9 INJECTION INTRAMUSCULAR; INTRAVENOUS; SUBCUTANEOUS at 22:40

## 2023-04-18 NOTE — ED ADULT NURSE NOTE - OBJECTIVE STATEMENT
Pt c/o intoxication and dehydration. Pt states she is daily drinker, last drink today. Denies drug use. Requesting detox. Pt's mother at bedside, pt cooperative with care, NAD noted on assessment. PIV placed, labs collected. Safety maintained, disposition pending.

## 2023-04-18 NOTE — ED ADULT TRIAGE NOTE - CHIEF COMPLAINT QUOTE
Pt c/o intoxication and dehydration. Last drink today. Daily drinker, typically has approx half a gallon of Titos a day. Denies drug use. Requesting detox.

## 2023-04-18 NOTE — ED PROVIDER NOTE - CLINICAL SUMMARY MEDICAL DECISION MAKING FREE TEXT BOX
Pt requesting [facility] admission for alcohol detox. Plan: labs, IV fluids, and hold till the morning for SBIRT. Pt requesting [facility] admission for alcohol detox. Plan: labs, IV fluids, and hold till the morning for SBIRT.    signed out to  pending psych and SBIRT eval in AM for possible [facility] transfer for alcohol detox. MD MAGALIE Pt requesting [facility] admission for alcohol detox. Plan: labs, IV fluids, and hold till the morning for SBIRT.    signed out to  pending psych and SBIRT eval in AM for possible [facility] transfer for alcohol detox. MD MAGALIE    12:37pm- received s/o from Dr. Canseco- patient is now withdrawing; will admitted for alcohol withdrawal; endorsed to Dr. Wallace.

## 2023-04-18 NOTE — ED PROVIDER NOTE - OBJECTIVE STATEMENT
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 Pt typically drinks "half a gallon of Titos" daily, last drank earlier today. Pt has never been in detox before, states she occasionally wakes up with shakes. Pt wants to get her life back on track. Called [facility] for admission but the rooms were filled, so they recommended coming into [facility] for placement.

## 2023-04-18 NOTE — ED PROVIDER NOTE - PHYSICAL EXAMINATION
Constitutional: NAD, AOx3, alcohol on breath, slurred speech  Eyes: PERRL EOMI  Head: Normocephalic atraumatic  Mouth: MMM  Cardiac: regular rate and rhythm  Resp: Lungs CTAB  GI: Abd s/nd/nt  Neuro: CN2-12 grossly intact, TORRES x 4  Skin: No visible rashes

## 2023-04-19 DIAGNOSIS — F10.929 ALCOHOL USE, UNSPECIFIED WITH INTOXICATION, UNSPECIFIED: ICD-10-CM

## 2023-04-19 LAB
ALBUMIN SERPL ELPH-MCNC: 3.1 G/DL — LOW (ref 3.3–5)
ALP SERPL-CCNC: 62 U/L — SIGNIFICANT CHANGE UP (ref 40–120)
ALT FLD-CCNC: 117 U/L — HIGH (ref 12–78)
ANION GAP SERPL CALC-SCNC: 10 MMOL/L — SIGNIFICANT CHANGE UP (ref 5–17)
AST SERPL-CCNC: 221 U/L — HIGH (ref 15–37)
BILIRUB DIRECT SERPL-MCNC: 0.5 MG/DL — HIGH (ref 0–0.3)
BILIRUB INDIRECT FLD-MCNC: 0.7 MG/DL — SIGNIFICANT CHANGE UP (ref 0.2–1)
BILIRUB SERPL-MCNC: 1.2 MG/DL — SIGNIFICANT CHANGE UP (ref 0.2–1.2)
BUN SERPL-MCNC: 6 MG/DL — LOW (ref 7–23)
CALCIUM SERPL-MCNC: 7.8 MG/DL — LOW (ref 8.5–10.1)
CHLORIDE SERPL-SCNC: 100 MMOL/L — SIGNIFICANT CHANGE UP (ref 96–108)
CO2 SERPL-SCNC: 20 MMOL/L — LOW (ref 22–31)
CREAT SERPL-MCNC: 0.5 MG/DL — SIGNIFICANT CHANGE UP (ref 0.5–1.3)
EGFR: 126 ML/MIN/1.73M2 — SIGNIFICANT CHANGE UP
ETHANOL SERPL-MCNC: 105 MG/DL — HIGH (ref 0–10)
GLUCOSE SERPL-MCNC: 88 MG/DL — SIGNIFICANT CHANGE UP (ref 70–99)
MAGNESIUM SERPL-MCNC: 1.3 MG/DL — LOW (ref 1.6–2.6)
PHOSPHATE SERPL-MCNC: 1.9 MG/DL — LOW (ref 2.5–4.5)
POTASSIUM SERPL-MCNC: 3.8 MMOL/L — SIGNIFICANT CHANGE UP (ref 3.5–5.3)
POTASSIUM SERPL-SCNC: 3.8 MMOL/L — SIGNIFICANT CHANGE UP (ref 3.5–5.3)
PROT SERPL-MCNC: 6.6 GM/DL — SIGNIFICANT CHANGE UP (ref 6–8.3)
SODIUM SERPL-SCNC: 130 MMOL/L — LOW (ref 135–145)

## 2023-04-19 PROCEDURE — 80048 BASIC METABOLIC PNL TOTAL CA: CPT

## 2023-04-19 PROCEDURE — 84100 ASSAY OF PHOSPHORUS: CPT

## 2023-04-19 PROCEDURE — 99223 1ST HOSP IP/OBS HIGH 75: CPT

## 2023-04-19 PROCEDURE — 36415 COLL VENOUS BLD VENIPUNCTURE: CPT

## 2023-04-19 PROCEDURE — 83735 ASSAY OF MAGNESIUM: CPT

## 2023-04-19 PROCEDURE — 80076 HEPATIC FUNCTION PANEL: CPT

## 2023-04-19 RX ORDER — FOLIC ACID 0.8 MG
1 TABLET ORAL DAILY
Refills: 0 | Status: DISCONTINUED | OUTPATIENT
Start: 2023-04-19 | End: 2023-04-24

## 2023-04-19 RX ORDER — THIAMINE MONONITRATE (VIT B1) 100 MG
100 TABLET ORAL DAILY
Refills: 0 | Status: DISCONTINUED | OUTPATIENT
Start: 2023-04-19 | End: 2023-04-24

## 2023-04-19 RX ORDER — SODIUM CHLORIDE 9 MG/ML
1000 INJECTION INTRAMUSCULAR; INTRAVENOUS; SUBCUTANEOUS ONCE
Refills: 0 | Status: COMPLETED | OUTPATIENT
Start: 2023-04-19 | End: 2023-04-19

## 2023-04-19 RX ORDER — SERTRALINE 25 MG/1
50 TABLET, FILM COATED ORAL ONCE
Refills: 0 | Status: COMPLETED | OUTPATIENT
Start: 2023-04-19 | End: 2023-04-19

## 2023-04-19 RX ORDER — DIAZEPAM 5 MG
5 TABLET ORAL ONCE
Refills: 0 | Status: DISCONTINUED | OUTPATIENT
Start: 2023-04-19 | End: 2023-04-19

## 2023-04-19 RX ORDER — ONDANSETRON 8 MG/1
4 TABLET, FILM COATED ORAL EVERY 6 HOURS
Refills: 0 | Status: DISCONTINUED | OUTPATIENT
Start: 2023-04-19 | End: 2023-04-24

## 2023-04-19 RX ORDER — ALPRAZOLAM 0.25 MG
1 TABLET ORAL ONCE
Refills: 0 | Status: DISCONTINUED | OUTPATIENT
Start: 2023-04-19 | End: 2023-04-19

## 2023-04-19 RX ORDER — ALPRAZOLAM 0.25 MG
0 TABLET ORAL
Qty: 0 | Refills: 0 | DISCHARGE

## 2023-04-19 RX ORDER — FOLIC ACID 0.8 MG
1 TABLET ORAL ONCE
Refills: 0 | Status: COMPLETED | OUTPATIENT
Start: 2023-04-19 | End: 2023-04-19

## 2023-04-19 RX ORDER — ONDANSETRON 8 MG/1
4 TABLET, FILM COATED ORAL ONCE
Refills: 0 | Status: COMPLETED | OUTPATIENT
Start: 2023-04-19 | End: 2023-04-19

## 2023-04-19 RX ADMIN — Medication 5 MILLIGRAM(S): at 01:57

## 2023-04-19 RX ADMIN — Medication 1 MILLIGRAM(S): at 12:02

## 2023-04-19 RX ADMIN — SERTRALINE 50 MILLIGRAM(S): 25 TABLET, FILM COATED ORAL at 08:06

## 2023-04-19 RX ADMIN — Medication 1 MILLIGRAM(S): at 08:07

## 2023-04-19 RX ADMIN — Medication 5 MILLIGRAM(S): at 08:06

## 2023-04-19 RX ADMIN — Medication 2 MILLIGRAM(S): at 14:40

## 2023-04-19 RX ADMIN — ONDANSETRON 4 MILLIGRAM(S): 8 TABLET, FILM COATED ORAL at 12:00

## 2023-04-19 RX ADMIN — Medication 2 MILLIGRAM(S): at 17:58

## 2023-04-19 RX ADMIN — SODIUM CHLORIDE 1000 MILLILITER(S): 9 INJECTION INTRAMUSCULAR; INTRAVENOUS; SUBCUTANEOUS at 12:59

## 2023-04-19 RX ADMIN — Medication 1 MILLIGRAM(S): at 03:08

## 2023-04-19 RX ADMIN — Medication 2 MILLIGRAM(S): at 22:18

## 2023-04-19 RX ADMIN — ONDANSETRON 4 MILLIGRAM(S): 8 TABLET, FILM COATED ORAL at 14:40

## 2023-04-19 NOTE — SBIRT NOTE ADULT - NSSBIRTDRGPOSREINDET_GEN_A_CORE
Services provided via Telephonic SBIRT line, [phone]. Positive reinforcement provided given patient currently within healthy guidelines. Education materials reviewed and given to patient.

## 2023-04-19 NOTE — H&P ADULT - NSHPLABSRESULTS_GEN_ALL_CORE
13.4   6.26  )-----------( 403      ( 18 Apr 2023 21:54 )             40.1   04-18    142  |  110<H>  |  7   ----------------------------<  85  4.4   |  22  |  0.59    Ca    8.9      18 Apr 2023 21:54    TPro  7.2  /  Alb  3.3  /  TBili  0.3  /  DBili  x   /  AST  266<H>  /  ALT  129<H>  /  AlkPhos  72  04-18

## 2023-04-19 NOTE — PATIENT PROFILE ADULT - FALL HARM RISK - HARM RISK INTERVENTIONS
Assistance with ambulation/Assistance OOB with selected safe patient handling equipment/Communicate Risk of Fall with Harm to all staff/Monitor for mental status changes/Monitor gait and stability/Reinforce activity limits and safety measures with patient and family/Tailored Fall Risk Interventions/Toileting schedule using arm’s reach rule for commode and bathroom/Use of alarms - bed, chair and/or voice tab/Visual Cue: Yellow wristband and red socks/Bed in lowest position, wheels locked, appropriate side rails in place/Call bell, personal items and telephone in reach/Instruct patient to call for assistance before getting out of bed or chair/Non-slip footwear when patient is out of bed/[place] to call system/Physically safe environment - no spills, clutter or unnecessary equipment/Purposeful Proactive Rounding/Room/bathroom lighting operational, light cord in reach

## 2023-04-19 NOTE — H&P ADULT - HISTORY OF PRESENT ILLNESS
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 Pt typically drinks "half a gallon of Titos" daily, last drank earlier today. Pt has never been in detox before, states she occasionally wakes up with shakes. Pt wants to get her life back on track. Called [facility] for admission but the rooms were filled. Admit for wd pt has tremors and feels anxious denies having seizures from wd in the past.

## 2023-04-19 NOTE — H&P ADULT - NSHPPHYSICALEXAM_GEN_ALL_CORE
Vital Signs Last 24 Hrs  T(C): 36.9 (19 Apr 2023 12:56), Max: 37.2 (18 Apr 2023 23:35)  T(F): 98.5 (19 Apr 2023 12:56), Max: 99 (18 Apr 2023 23:35)  HR: 127 (19 Apr 2023 12:56) (85 - 127)  BP: 137/95 (19 Apr 2023 12:56) (109/80 - 137/95)  BP(mean): 110 (19 Apr 2023 12:56) (89 - 110)  RR: 19 (19 Apr 2023 12:56) (17 - 20)  SpO2: 97% (19 Apr 2023 12:56) (94% - 100%)    Parameters below as of 19 Apr 2023 12:56  Patient On (Oxygen Delivery Method): room air    PHYSICAL EXAM:      Constitutional: NAD  Eyes: perrl, no conjunctival changes  ENMT: no exudates, moist oral muc, uvula midline  Neck: no JVD, no LAD  Back: no cva tenderness  Respiratory: CTA, no exp wheezes  Cardiovascular: S1S2 reg, no murmur gallop or rub  Gastrointestinal: abd soft, NT/ND + BS  Genitourinary: voiding  Extremities: FROM, no joint effusions, no edema, no clubbing , no cyanosis  Vascular: pedal pulses + bilateral, warm extremities  Neurological: non focal, mot str 5/5/ all extr + tremors  Skin: no rashes  Lymph Nodes: no LAD

## 2023-04-19 NOTE — SBIRT NOTE ADULT - NSSBIRTBRIEFINTDET_GEN_A_CORE
Services provided via Telephonic SBIRT line, [phone]. Screening results were reviewed with the patient and patient was provided information about healthy guidelines and potential negative consequences associated with level of risk. Motivation and readiness to reduce or stop use was discussed and goals and activities to make changes were suggested/offered. Patient is requesting referral to detox, HC will explore options with patient. Services provided via Telephonic SBIRT line, [phone]. Screening results were reviewed with the patient and patient was provided information about healthy guidelines and potential negative consequences associated with level of risk. Motivation and readiness to reduce or stop use was discussed and goals and activities to make changes were suggested/offered. Patient is requesting referral to [facility]. As per [facility] pt will need neurology clearance. Patient requesting discharge so she many follow up with her neurologist for clearance and then [facility] for potential detox admission. Refused referral to [facility] based inpatient detox.  provided patient with tSBIRT phone for follow up and assistance, [phone].

## 2023-04-19 NOTE — ED ADULT NURSE REASSESSMENT NOTE - NS ED NURSE REASSESS COMMENT FT1
Assumed care of pt at 0715.  Pt belongings taken and locked up with security.  Pt moved to 20H for observation.  Pt given menu to order breakfast.
Spoke with SBIRT in regards to patient admission. SBIRT spoke with patient for reevaluation. Patient is to be admitted.
pt scored at 20 on CIWA assessment, but there are outstanding exceptions.  Pt vomitted, but was not nauseaous at all times.  Pt also became moderately anxious only due to fact that pt was vomitting.  Pt was baseline mildly anxious before this occurrance.  ED MD aware of this score/these circumstances.  Pt to be medicated for score
Pt wanded by security

## 2023-04-20 LAB
ANION GAP SERPL CALC-SCNC: 5 MMOL/L — SIGNIFICANT CHANGE UP (ref 5–17)
BUN SERPL-MCNC: 4 MG/DL — LOW (ref 7–23)
CALCIUM SERPL-MCNC: 8.5 MG/DL — SIGNIFICANT CHANGE UP (ref 8.5–10.1)
CHLORIDE SERPL-SCNC: 106 MMOL/L — SIGNIFICANT CHANGE UP (ref 96–108)
CO2 SERPL-SCNC: 22 MMOL/L — SIGNIFICANT CHANGE UP (ref 22–31)
CREAT SERPL-MCNC: 0.5 MG/DL — SIGNIFICANT CHANGE UP (ref 0.5–1.3)
EGFR: 126 ML/MIN/1.73M2 — SIGNIFICANT CHANGE UP
GLUCOSE SERPL-MCNC: 99 MG/DL — SIGNIFICANT CHANGE UP (ref 70–99)
MAGNESIUM SERPL-MCNC: 1.8 MG/DL — SIGNIFICANT CHANGE UP (ref 1.6–2.6)
PHOSPHATE SERPL-MCNC: 2.1 MG/DL — LOW (ref 2.5–4.5)
POTASSIUM SERPL-MCNC: 3.3 MMOL/L — LOW (ref 3.5–5.3)
POTASSIUM SERPL-SCNC: 3.3 MMOL/L — LOW (ref 3.5–5.3)
SODIUM SERPL-SCNC: 133 MMOL/L — LOW (ref 135–145)

## 2023-04-20 PROCEDURE — 99233 SBSQ HOSP IP/OBS HIGH 50: CPT

## 2023-04-20 RX ORDER — ENOXAPARIN SODIUM 100 MG/ML
40 INJECTION SUBCUTANEOUS EVERY 24 HOURS
Refills: 0 | Status: DISCONTINUED | OUTPATIENT
Start: 2023-04-20 | End: 2023-04-24

## 2023-04-20 RX ORDER — SERTRALINE 25 MG/1
50 TABLET, FILM COATED ORAL DAILY
Refills: 0 | Status: DISCONTINUED | OUTPATIENT
Start: 2023-04-20 | End: 2023-04-24

## 2023-04-20 RX ORDER — POTASSIUM PHOSPHATE, MONOBASIC POTASSIUM PHOSPHATE, DIBASIC 236; 224 MG/ML; MG/ML
30 INJECTION, SOLUTION INTRAVENOUS ONCE
Refills: 0 | Status: COMPLETED | OUTPATIENT
Start: 2023-04-20 | End: 2023-04-20

## 2023-04-20 RX ADMIN — POTASSIUM PHOSPHATE, MONOBASIC POTASSIUM PHOSPHATE, DIBASIC 83.33 MILLIMOLE(S): 236; 224 INJECTION, SOLUTION INTRAVENOUS at 14:27

## 2023-04-20 RX ADMIN — Medication 1.5 MILLIGRAM(S): at 13:03

## 2023-04-20 RX ADMIN — Medication 100 MILLIGRAM(S): at 09:08

## 2023-04-20 RX ADMIN — Medication 1 MILLIGRAM(S): at 09:08

## 2023-04-20 RX ADMIN — ENOXAPARIN SODIUM 40 MILLIGRAM(S): 100 INJECTION SUBCUTANEOUS at 13:03

## 2023-04-20 RX ADMIN — Medication 1.5 MILLIGRAM(S): at 17:17

## 2023-04-20 RX ADMIN — SERTRALINE 50 MILLIGRAM(S): 25 TABLET, FILM COATED ORAL at 09:45

## 2023-04-20 RX ADMIN — Medication 1.5 MILLIGRAM(S): at 22:00

## 2023-04-20 RX ADMIN — ONDANSETRON 4 MILLIGRAM(S): 8 TABLET, FILM COATED ORAL at 20:26

## 2023-04-20 RX ADMIN — Medication 2 MILLIGRAM(S): at 02:19

## 2023-04-20 RX ADMIN — Medication 2 MILLIGRAM(S): at 06:14

## 2023-04-20 RX ADMIN — Medication 2 MILLIGRAM(S): at 09:07

## 2023-04-20 NOTE — PROGRESS NOTE ADULT - SUBJECTIVE AND OBJECTIVE BOX
CHIEF COMPLAINT/INTERVAL HISTORY:    Patient is a 33y old  Female who presents with a chief complaint of Pt c/o intoxication and dehydration. Last drink today. Daily drinker, typically has approx half a gallon of Titos a day. Denies drug use. Requesting detox. 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 Pt typically drinks "half a gallon of Titos" daily, last drank earlier today. Pt has never been in detox before, states she occasionally wakes up with shakes. Pt wants to get her life back on track. Called [facility] for admission but the rooms were filled. Admit for wd pt has tremors and feels anxious denies having seizures from wd in the past. (2023 14:22)      SUBJECTIVE & OBJECTIVE: Pt seen and examined at bedside.     ICU Vital Signs Last 24 Hrs  T(C): 36.9 (2023 10:02), Max: 37.4 (2023 05:50)  T(F): 98.4 (2023 10:02), Max: 99.3 (2023 05:50)  HR: 94 (2023 10:02) (81 - 127)  BP: 131/89 (2023 10:02) (120/78 - 137/95)  BP(mean): 110 (2023 12:56) (110 - 110)  ABP: --  ABP(mean): --  RR: 18 (2023 10:02) (18 - 19)  SpO2: 100% (2023 10:02) (97% - 100%)    O2 Parameters below as of 2023 10:02  Patient On (Oxygen Delivery Method): room air              MEDICATIONS  (STANDING):  folic acid 1 milliGRAM(s) Oral daily  LORazepam   Injectable   IV Push   LORazepam   Injectable 1.5 milliGRAM(s) IV Push every 4 hours  sertraline 50 milliGRAM(s) Oral daily  thiamine 100 milliGRAM(s) Oral daily    MEDICATIONS  (PRN):  LORazepam   Injectable 2 milliGRAM(s) IV Push every 1 hour PRN Symptom-triggered: each CIWA -Ar score 8 or GREATER  LORazepam   Injectable 2 milliGRAM(s) IV Push every 2 hours PRN CIWA-Ar score increase by 2 points and a total score of 7 or less  ondansetron Injectable 4 milliGRAM(s) IV Push every 6 hours PRN Nausea and/or Vomiting        PHYSICAL EXAM:    GENERAL: NAD, well-groomed, well-developed  NERVOUS SYSTEM:  Alert & Oriented X3, Motor Strength 5/5 B/L upper and lower extremities; DTRs 2+ intact and symmetric  CHEST/LUNG: Clear to auscultation bilaterally; No rales, rhonchi, wheezing, or rubs  HEART: Regular rate and rhythm; No murmurs, rubs, or gallops  ABDOMEN: Soft, Nontender, Nondistended; Bowel sounds present  EXTREMITIES:  2+ Peripheral Pulses, No clubbing, cyanosis, or edema    LABS:                        13.4   6.26  )-----------( 403      ( 2023 21:54 )             40.1     04-20    133<L>  |  106  |  4<L>  ----------------------------<  99  3.3<L>   |  22  |  0.50    Ca    8.5      2023 08:12  Phos  2.1     04-20  Mg     1.8     04-20    TPro  6.6  /  Alb  3.1<L>  /  TBili  1.2  /  DBili  0.5<H>  /  AST  221<H>  /  ALT  117<H>  /  AlkPhos  62  04-19      Urinalysis Basic - ( 2023 22:36 )    Color: Yellow / Appearance: Clear / S.010 / pH: x  Gluc: x / Ketone: Trace  / Bili: Negative / Urobili: Negative   Blood: x / Protein: Negative / Nitrite: Negative   Leuk Esterase: Negative / RBC: 0-2 /HPF / WBC 3-5 /HPF   Sq Epi: x / Non Sq Epi: x / Bacteria: Few        ETOH wd  CIWA  folate  thiamine    * Abnormal LFT sec to ETOH abuse     replace K     CHIEF COMPLAINT/INTERVAL HISTORY:    Patient is a 33y old  Female who presents with a chief complaint of Pt c/o intoxication and dehydration. Last drink today. Daily drinker, typically has approx half a gallon of Titos a day. Denies drug use. Requesting detox. 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 Pt typically drinks "half a gallon of Titos" daily, last drank earlier today. Pt has never been in detox before, states she occasionally wakes up with shakes. Pt wants to get her life back on track. Called [facility] for admission but the rooms were filled. Admit for wd pt has tremors and feels anxious denies having seizures from wd in the past. (2023 14:22)  Per Mum OP w/up was planned to r/o tia      SUBJECTIVE & OBJECTIVE: Pt seen and examined at bedside.     ICU Vital Signs Last 24 Hrs  T(C): 36.9 (2023 10:02), Max: 37.4 (2023 05:50)  T(F): 98.4 (2023 10:02), Max: 99.3 (2023 05:50)  HR: 94 (2023 10:02) (81 - 127)  BP: 131/89 (2023 10:02) (120/78 - 137/95)  BP(mean): 110 (2023 12:56) (110 - 110)  ABP: --  ABP(mean): --  RR: 18 (2023 10:02) (18 - 19)  SpO2: 100% (2023 10:02) (97% - 100%)    O2 Parameters below as of 2023 10:02  Patient On (Oxygen Delivery Method): room air              MEDICATIONS  (STANDING):  folic acid 1 milliGRAM(s) Oral daily  LORazepam   Injectable   IV Push   LORazepam   Injectable 1.5 milliGRAM(s) IV Push every 4 hours  sertraline 50 milliGRAM(s) Oral daily  thiamine 100 milliGRAM(s) Oral daily    MEDICATIONS  (PRN):  LORazepam   Injectable 2 milliGRAM(s) IV Push every 1 hour PRN Symptom-triggered: each CIWA -Ar score 8 or GREATER  LORazepam   Injectable 2 milliGRAM(s) IV Push every 2 hours PRN CIWA-Ar score increase by 2 points and a total score of 7 or less  ondansetron Injectable 4 milliGRAM(s) IV Push every 6 hours PRN Nausea and/or Vomiting        PHYSICAL EXAM:    GENERAL: NAD, well-groomed, well-developed  NERVOUS SYSTEM:  Alert & Oriented X3, Motor Strength 5/5 B/L upper and lower extremities; DTRs 2+ intact and symmetric  CHEST/LUNG: Clear to auscultation bilaterally; No rales, rhonchi, wheezing, or rubs  HEART: Regular rate and rhythm; No murmurs, rubs, or gallops  ABDOMEN: Soft, Nontender, Nondistended; Bowel sounds present  EXTREMITIES:  2+ Peripheral Pulses, No clubbing, cyanosis, or edema    LABS:                        13.4   6.26  )-----------( 403      ( 2023 21:54 )             40.1     04-20    133<L>  |  106  |  4<L>  ----------------------------<  99  3.3<L>   |  22  |  0.50    Ca    8.5      2023 08:12  Phos  2.1     04-20  Mg     1.8     04-20    TPro  6.6  /  Alb  3.1<L>  /  TBili  1.2  /  DBili  0.5<H>  /  AST  221<H>  /  ALT  117<H>  /  AlkPhos  62  04-19      Urinalysis Basic - ( 2023 22:36 )    Color: Yellow / Appearance: Clear / S.010 / pH: x  Gluc: x / Ketone: Trace  / Bili: Negative / Urobili: Negative   Blood: x / Protein: Negative / Nitrite: Negative   Leuk Esterase: Negative / RBC: 0-2 /HPF / WBC 3-5 /HPF   Sq Epi: x / Non Sq Epi: x / Bacteria: Few        ETOH wd  CIWA  folate  thiamine  Neurology consult per [facility] request  MRI ordered    * Abnormal LFT sec to ETOH abuse     replace K     CHIEF COMPLAINT/INTERVAL HISTORY:    Patient is a 33y old  Female who presents with a chief complaint of Pt c/o intoxication and dehydration. Last drink today. Daily drinker, typically has approx half a gallon of Titos a day. Denies drug use. Requesting detox. 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 Pt typically drinks "half a gallon of Titos" daily, last drank earlier today. Pt has never been in detox before, states she occasionally wakes up with shakes. Pt wants to get her life back on track. Called [facility] for admission but the rooms were filled. Admit for wd pt has tremors and feels anxious denies having seizures from wd in the past. (2023 14:22)  Per Mum OP w/up was planned to r/o tia; pt denies it      SUBJECTIVE & OBJECTIVE: Pt seen and examined at bedside.     ICU Vital Signs Last 24 Hrs  T(C): 36.9 (2023 10:02), Max: 37.4 (2023 05:50)  T(F): 98.4 (2023 10:02), Max: 99.3 (2023 05:50)  HR: 94 (2023 10:02) (81 - 127)  BP: 131/89 (2023 10:02) (120/78 - 137/95)  BP(mean): 110 (2023 12:56) (110 - 110)  ABP: --  ABP(mean): --  RR: 18 (2023 10:02) (18 - 19)  SpO2: 100% (2023 10:02) (97% - 100%)    O2 Parameters below as of 2023 10:02  Patient On (Oxygen Delivery Method): room air              MEDICATIONS  (STANDING):  folic acid 1 milliGRAM(s) Oral daily  LORazepam   Injectable   IV Push   LORazepam   Injectable 1.5 milliGRAM(s) IV Push every 4 hours  sertraline 50 milliGRAM(s) Oral daily  thiamine 100 milliGRAM(s) Oral daily    MEDICATIONS  (PRN):  LORazepam   Injectable 2 milliGRAM(s) IV Push every 1 hour PRN Symptom-triggered: each CIWA -Ar score 8 or GREATER  LORazepam   Injectable 2 milliGRAM(s) IV Push every 2 hours PRN CIWA-Ar score increase by 2 points and a total score of 7 or less  ondansetron Injectable 4 milliGRAM(s) IV Push every 6 hours PRN Nausea and/or Vomiting        PHYSICAL EXAM:    GENERAL: NAD, well-groomed, well-developed  NERVOUS SYSTEM:  Alert & Oriented X3, Motor Strength 5/5 B/L upper and lower extremities; DTRs 2+ intact and symmetric  CHEST/LUNG: Clear to auscultation bilaterally; No rales, rhonchi, wheezing, or rubs  HEART: Regular rate and rhythm; No murmurs, rubs, or gallops  ABDOMEN: Soft, Nontender, Nondistended; Bowel sounds present  EXTREMITIES:  2+ Peripheral Pulses, No clubbing, cyanosis, or edema    LABS:                        13.4   6.26  )-----------( 403      ( 2023 21:54 )             40.1     04-20    133<L>  |  106  |  4<L>  ----------------------------<  99  3.3<L>   |  22  |  0.50    Ca    8.5      2023 08:12  Phos  2.1     04-20  Mg     1.8     04-20    TPro  6.6  /  Alb  3.1<L>  /  TBili  1.2  /  DBili  0.5<H>  /  AST  221<H>  /  ALT  117<H>  /  AlkPhos  62  04-19      Urinalysis Basic - ( 2023 22:36 )    Color: Yellow / Appearance: Clear / S.010 / pH: x  Gluc: x / Ketone: Trace  / Bili: Negative / Urobili: Negative   Blood: x / Protein: Negative / Nitrite: Negative   Leuk Esterase: Negative / RBC: 0-2 /HPF / WBC 3-5 /HPF   Sq Epi: x / Non Sq Epi: x / Bacteria: Few        ETOH wd  CIWA  folate  thiamine  pt wants a different facility for Detox    * Abnormal LFT sec to ETOH abuse     replace K    Pt doesn't want us to d/w Mum aspects of her care

## 2023-04-21 ENCOUNTER — NON-APPOINTMENT (OUTPATIENT)
Age: 33
End: 2023-04-21

## 2023-04-21 LAB
ANION GAP SERPL CALC-SCNC: 5 MMOL/L — SIGNIFICANT CHANGE UP (ref 5–17)
BUN SERPL-MCNC: 4 MG/DL — LOW (ref 7–23)
CALCIUM SERPL-MCNC: 8.8 MG/DL — SIGNIFICANT CHANGE UP (ref 8.5–10.1)
CHLORIDE SERPL-SCNC: 108 MMOL/L — SIGNIFICANT CHANGE UP (ref 96–108)
CO2 SERPL-SCNC: 20 MMOL/L — LOW (ref 22–31)
CREAT SERPL-MCNC: 0.5 MG/DL — SIGNIFICANT CHANGE UP (ref 0.5–1.3)
EGFR: 126 ML/MIN/1.73M2 — SIGNIFICANT CHANGE UP
GLUCOSE SERPL-MCNC: 97 MG/DL — SIGNIFICANT CHANGE UP (ref 70–99)
PHOSPHATE SERPL-MCNC: 2.6 MG/DL — SIGNIFICANT CHANGE UP (ref 2.5–4.5)
POTASSIUM SERPL-MCNC: 3.4 MMOL/L — LOW (ref 3.5–5.3)
POTASSIUM SERPL-SCNC: 3.4 MMOL/L — LOW (ref 3.5–5.3)
SODIUM SERPL-SCNC: 133 MMOL/L — LOW (ref 135–145)

## 2023-04-21 PROCEDURE — 99233 SBSQ HOSP IP/OBS HIGH 50: CPT

## 2023-04-21 RX ORDER — POTASSIUM CHLORIDE 20 MEQ
40 PACKET (EA) ORAL ONCE
Refills: 0 | Status: COMPLETED | OUTPATIENT
Start: 2023-04-21 | End: 2023-04-21

## 2023-04-21 RX ADMIN — Medication 1 MILLIGRAM(S): at 17:07

## 2023-04-21 RX ADMIN — Medication 1.5 MILLIGRAM(S): at 09:09

## 2023-04-21 RX ADMIN — Medication 1.5 MILLIGRAM(S): at 02:06

## 2023-04-21 RX ADMIN — Medication 1 MILLIGRAM(S): at 22:03

## 2023-04-21 RX ADMIN — Medication 100 MILLIGRAM(S): at 09:10

## 2023-04-21 RX ADMIN — SERTRALINE 50 MILLIGRAM(S): 25 TABLET, FILM COATED ORAL at 09:10

## 2023-04-21 RX ADMIN — Medication 1 MILLIGRAM(S): at 09:10

## 2023-04-21 RX ADMIN — Medication 1.5 MILLIGRAM(S): at 06:00

## 2023-04-21 RX ADMIN — Medication 1 MILLIGRAM(S): at 13:12

## 2023-04-21 RX ADMIN — ENOXAPARIN SODIUM 40 MILLIGRAM(S): 100 INJECTION SUBCUTANEOUS at 13:12

## 2023-04-21 RX ADMIN — Medication 40 MILLIEQUIVALENT(S): at 17:07

## 2023-04-21 NOTE — PROGRESS NOTE ADULT - SUBJECTIVE AND OBJECTIVE BOX
CHIEF COMPLAINT/INTERVAL HISTORY:    Patient is a 33y old  Female who presents with a chief complaint of Pt c/o intoxication and dehydration. Last drink today. Daily drinker, typically has approx half a gallon of Titos a day. Denies drug use. Requesting detox. 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 Pt typically drinks "half a gallon of Titos" daily, last drank earlier today. Pt has never been in detox before, states she occasionally wakes up with shakes. Pt wants to get her life back on track. Called [facility] for admission but the rooms were filled. Admit for wd pt has tremors and feels anxious denies having seizures from wd in the past. (2023 14:22)        Vital Signs Last 24 Hrs  T(C): 37 (2023 10:10), Max: 37.1 (2023 14:05)  T(F): 98.6 (2023 10:10), Max: 98.8 (2023 14:05)  HR: 85 (2023 10:10) (76 - 93)  BP: 128/80 (2023 10:10) (120/84 - 146/90)  BP(mean): --  RR: 18 (2023 10:10) (18 - 19)  SpO2: 100% (2023 10:10) (97% - 100%)    Parameters below as of 2023 10:10  Patient On (Oxygen Delivery Method): room air        MEDICATIONS  (STANDING):  folic acid 1 milliGRAM(s) Oral daily  LORazepam   Injectable   IV Push   LORazepam   Injectable 1.5 milliGRAM(s) IV Push every 4 hours  sertraline 50 milliGRAM(s) Oral daily  thiamine 100 milliGRAM(s) Oral daily    MEDICATIONS  (PRN):  LORazepam   Injectable 2 milliGRAM(s) IV Push every 1 hour PRN Symptom-triggered: each CIWA -Ar score 8 or GREATER  LORazepam   Injectable 2 milliGRAM(s) IV Push every 2 hours PRN CIWA-Ar score increase by 2 points and a total score of 7 or less  ondansetron Injectable 4 milliGRAM(s) IV Push every 6 hours PRN Nausea and/or Vomiting        PHYSICAL EXAM:    GENERAL: NAD, well-groomed, well-developed  NERVOUS SYSTEM:  Alert & Oriented X3, Motor Strength 5/5 B/L upper and lower extremities; DTRs 2+ intact and symmetric  CHEST/LUNG: Clear to auscultation bilaterally; No rales, rhonchi, wheezing, or rubs  HEART: Regular rate and rhythm; No murmurs, rubs, or gallops  ABDOMEN: Soft, Nontender, Nondistended; Bowel sounds present  EXTREMITIES:  2+ Peripheral Pulses, No clubbing, cyanosis, or edema    LABS:                 133<L>  |  108  |  4<L>  ----------------------------<  97  3.4<L>   |  20<L>  |  0.50    Ca    8.8      2023 07:26  Phos  2.6     -  Mg     1.8     -    TPro  6.6  /  Alb  3.1<L>  /  TBili  1.2  /  DBili  0.5<H>  /  AST  221<H>  /  ALT  117<H>  /  AlkPhos  62  04-19        Urinalysis Basic - ( 2023 22:36 )    Color: Yellow / Appearance: Clear / S.010 / pH: x  Gluc: x / Ketone: Trace  / Bili: Negative / Urobili: Negative   Blood: x / Protein: Negative / Nitrite: Negative   Leuk Esterase: Negative / RBC: 0-2 /HPF / WBC 3-5 /HPF   Sq Epi: x / Non Sq Epi: x / Bacteria: Few        ETOH wd  CIWA  folate  thiamine  pt wants a different facility for Detox    * Abnormal LFT sec to ETOH abuse     replace K again    Pt doesn't want us to d/w Mum aspects of her care

## 2023-04-22 LAB
ANION GAP SERPL CALC-SCNC: 4 MMOL/L — LOW (ref 5–17)
BUN SERPL-MCNC: 4 MG/DL — LOW (ref 7–23)
CALCIUM SERPL-MCNC: 9.2 MG/DL — SIGNIFICANT CHANGE UP (ref 8.5–10.1)
CHLORIDE SERPL-SCNC: 109 MMOL/L — HIGH (ref 96–108)
CO2 SERPL-SCNC: 25 MMOL/L — SIGNIFICANT CHANGE UP (ref 22–31)
CREAT SERPL-MCNC: 0.45 MG/DL — LOW (ref 0.5–1.3)
EGFR: 129 ML/MIN/1.73M2 — SIGNIFICANT CHANGE UP
GLUCOSE SERPL-MCNC: 94 MG/DL — SIGNIFICANT CHANGE UP (ref 70–99)
POTASSIUM SERPL-MCNC: 3.6 MMOL/L — SIGNIFICANT CHANGE UP (ref 3.5–5.3)
POTASSIUM SERPL-SCNC: 3.6 MMOL/L — SIGNIFICANT CHANGE UP (ref 3.5–5.3)
SODIUM SERPL-SCNC: 138 MMOL/L — SIGNIFICANT CHANGE UP (ref 135–145)

## 2023-04-22 PROCEDURE — 99232 SBSQ HOSP IP/OBS MODERATE 35: CPT

## 2023-04-22 RX ADMIN — ENOXAPARIN SODIUM 40 MILLIGRAM(S): 100 INJECTION SUBCUTANEOUS at 14:00

## 2023-04-22 RX ADMIN — Medication 1 MILLIGRAM(S): at 10:40

## 2023-04-22 RX ADMIN — SERTRALINE 50 MILLIGRAM(S): 25 TABLET, FILM COATED ORAL at 10:41

## 2023-04-22 RX ADMIN — ONDANSETRON 4 MILLIGRAM(S): 8 TABLET, FILM COATED ORAL at 18:46

## 2023-04-22 RX ADMIN — Medication 1 MILLIGRAM(S): at 06:00

## 2023-04-22 RX ADMIN — Medication 0.5 MILLIGRAM(S): at 21:39

## 2023-04-22 RX ADMIN — Medication 1 MILLIGRAM(S): at 02:24

## 2023-04-22 RX ADMIN — Medication 0.5 MILLIGRAM(S): at 13:59

## 2023-04-22 RX ADMIN — Medication 0.5 MILLIGRAM(S): at 17:39

## 2023-04-22 RX ADMIN — Medication 100 MILLIGRAM(S): at 10:41

## 2023-04-22 NOTE — PROGRESS NOTE ADULT - SUBJECTIVE AND OBJECTIVE BOX
CHIEF COMPLAINT/INTERVAL HISTORY:    Patient is a 33y old  Female who presents with a chief complaint of Pt c/o intoxication and dehydration. Last drink today. Daily drinker, typically has approx half a gallon of Titos a day. Denies drug use. Requesting detox. 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 Pt typically drinks "half a gallon of Titos" daily, last drank earlier today. Pt has never been in detox before, states she occasionally wakes up with shakes. Pt wants to get her life back on track. Called [facility] for admission but the rooms were filled. Admit for wd pt has tremors and feels anxious denies having seizures from wd in the past. (2023 14:22)        Vital Signs Last 24 Hrs  T(C): 36.6 (2023 07:51), Max: 36.8 (2023 02:00)  T(F): 97.8 (2023 07:51), Max: 98.3 (2023 02:00)  HR: 104 (2023 07:51) (70 - 104)  BP: 130/98 (2023 07:51) (114/77 - 141/92)  BP(mean): --  RR: 16 (2023 07:51) (16 - 18)  SpO2: 92% (2023 07:51) (92% - 100%)    Parameters below as of 2023 07:51  Patient On (Oxygen Delivery Method): room air            MEDICATIONS  (STANDING):  folic acid 1 milliGRAM(s) Oral daily  LORazepam   Injectable   IV Push   LORazepam   Injectable 1.5 milliGRAM(s) IV Push every 4 hours  sertraline 50 milliGRAM(s) Oral daily  thiamine 100 milliGRAM(s) Oral daily    MEDICATIONS  (PRN):  LORazepam   Injectable 2 milliGRAM(s) IV Push every 1 hour PRN Symptom-triggered: each CIWA -Ar score 8 or GREATER  LORazepam   Injectable 2 milliGRAM(s) IV Push every 2 hours PRN CIWA-Ar score increase by 2 points and a total score of 7 or less  ondansetron Injectable 4 milliGRAM(s) IV Push every 6 hours PRN Nausea and/or Vomiting        PHYSICAL EXAM:    GENERAL: NAD, well-groomed, well-developed  NERVOUS SYSTEM:  Alert & Oriented X3, Motor Strength 5/5 B/L upper and lower extremities; DTRs 2+ intact and symmetric  CHEST/LUNG: Clear to auscultation bilaterally; No rales, rhonchi, wheezing, or rubs  HEART: Regular rate and rhythm; No murmurs, rubs, or gallops  ABDOMEN: Soft, Nontender, Nondistended; Bowel sounds present  EXTREMITIES:  2+ Peripheral Pulses, No clubbing, cyanosis, or edema    LABS:                 133<L>  |  108  |  4<L>  ----------------------------<  97  3.4<L>   |  20<L>  |  0.50    Ca    8.8      2023 07:26  Phos  2.6     -  Mg     1.8     -    TPro  6.6  /  Alb  3.1<L>  /  TBili  1.2  /  DBili  0.5<H>  /  AST  221<H>  /  ALT  117<H>  /  AlkPhos  62  04-19        Urinalysis Basic - ( 2023 22:36 )    Color: Yellow / Appearance: Clear / S.010 / pH: x  Gluc: x / Ketone: Trace  / Bili: Negative / Urobili: Negative   Blood: x / Protein: Negative / Nitrite: Negative   Leuk Esterase: Negative / RBC: 0-2 /HPF / WBC 3-5 /HPF   Sq Epi: x / Non Sq Epi: x / Bacteria: Few        ETOH wd  CIWA  folate  thiamine  pt wants a different facility for Detox    * Abnormal LFT sec to ETOH abuse     replace K again    Pt doesn't want us to d/w Mum aspects of her care

## 2023-04-23 PROCEDURE — 99232 SBSQ HOSP IP/OBS MODERATE 35: CPT

## 2023-04-23 RX ADMIN — Medication 2 MILLIGRAM(S): at 22:34

## 2023-04-23 RX ADMIN — Medication 0.5 MILLIGRAM(S): at 06:00

## 2023-04-23 RX ADMIN — Medication 0.5 MILLIGRAM(S): at 11:28

## 2023-04-23 RX ADMIN — SERTRALINE 50 MILLIGRAM(S): 25 TABLET, FILM COATED ORAL at 11:28

## 2023-04-23 RX ADMIN — ENOXAPARIN SODIUM 40 MILLIGRAM(S): 100 INJECTION SUBCUTANEOUS at 15:32

## 2023-04-23 RX ADMIN — Medication 1 MILLIGRAM(S): at 11:28

## 2023-04-23 RX ADMIN — Medication 100 MILLIGRAM(S): at 11:28

## 2023-04-23 RX ADMIN — Medication 0.5 MILLIGRAM(S): at 02:01

## 2023-04-23 NOTE — PROGRESS NOTE ADULT - SUBJECTIVE AND OBJECTIVE BOX
CHIEF COMPLAINT/INTERVAL HISTORY:    Patient is a 33y old  Female who presents with a chief complaint of Pt c/o intoxication and dehydration. Last drink today. Daily drinker, typically has approx half a gallon of Titos a day. Denies drug use. Requesting detox. (19 Apr 2023 14:22)      HPI:   33 year old female with PMHx of ETOH abuse and anxiety secondary to trauma presents to the ED with mother requesting detox. Pt typically drinks "half a gallon of Titos" daily, last drank earlier today. Pt has never been in detox before, states she occasionally wakes up with shakes. Pt wants to get her life back on track. Called [facility] for admission but the rooms were filled. Admit for wd pt has tremors and feels anxious denies having seizures from wd in the past. (19 Apr 2023 14:22)    Improved on taper, nausea and tremors resolved    Vital Signs Last 24 Hrs  T(C): 36.7 (23 Apr 2023 06:02), Max: 37.3 (22 Apr 2023 21:34)  T(F): 98 (23 Apr 2023 06:02), Max: 99.1 (22 Apr 2023 21:34)  HR: 80 (23 Apr 2023 06:02) (74 - 84)  BP: 127/71 (23 Apr 2023 06:02) (115/78 - 127/71)  BP(mean): --  RR: 17 (23 Apr 2023 06:02) (16 - 18)  SpO2: 96% (23 Apr 2023 06:02) (96% - 100%)    Parameters below as of 23 Apr 2023 06:02  Patient On (Oxygen Delivery Method): room air            MEDICATIONS  (STANDING):  folic acid 1 milliGRAM(s) Oral daily  LORazepam   Injectable   IV Push   LORazepam   Injectable 1.5 milliGRAM(s) IV Push every 4 hours  sertraline 50 milliGRAM(s) Oral daily  thiamine 100 milliGRAM(s) Oral daily    MEDICATIONS  (PRN):  LORazepam   Injectable 2 milliGRAM(s) IV Push every 1 hour PRN Symptom-triggered: each CIWA -Ar score 8 or GREATER  LORazepam   Injectable 2 milliGRAM(s) IV Push every 2 hours PRN CIWA-Ar score increase by 2 points and a total score of 7 or less  ondansetron Injectable 4 milliGRAM(s) IV Push every 6 hours PRN Nausea and/or Vomiting        PHYSICAL EXAM:    GENERAL: NAD, well-groomed, well-developed  NERVOUS SYSTEM:  Alert & Oriented X3, Motor Strength 5/5 B/L upper and lower extremities; DTRs 2+ intact and symmetric  CHEST/LUNG: Clear to auscultation bilaterally; No rales, rhonchi, wheezing, or rubs  HEART: Regular rate and rhythm; No murmurs, rubs, or gallops  ABDOMEN: Soft, Nontender, Nondistended; Bowel sounds present  EXTREMITIES:  2+ Peripheral Pulses, No clubbing, cyanosis, or edema    LABS:            ETOH wd  CIWA  folate  thiamine  pt wants a different facility for Detox    * Abnormal LFT sec to ETOH abuse     replace K again    Pt doesn't want us to d/w Mum aspects of her care; she is making arrangements for rehab of her choice dc when off taper- Mon

## 2023-04-24 ENCOUNTER — TRANSCRIPTION ENCOUNTER (OUTPATIENT)
Age: 33
End: 2023-04-24

## 2023-04-24 VITALS
SYSTOLIC BLOOD PRESSURE: 113 MMHG | DIASTOLIC BLOOD PRESSURE: 737 MMHG | TEMPERATURE: 98 F | HEART RATE: 79 BPM | OXYGEN SATURATION: 100 % | RESPIRATION RATE: 18 BRPM

## 2023-04-24 PROCEDURE — 90791 PSYCH DIAGNOSTIC EVALUATION: CPT

## 2023-04-24 PROCEDURE — 99239 HOSP IP/OBS DSCHRG MGMT >30: CPT

## 2023-04-24 RX ORDER — SERTRALINE 25 MG/1
1 TABLET, FILM COATED ORAL
Refills: 0 | DISCHARGE

## 2023-04-24 RX ORDER — THIAMINE MONONITRATE (VIT B1) 100 MG
1 TABLET ORAL
Qty: 0 | Refills: 0 | DISCHARGE
Start: 2023-04-24

## 2023-04-24 RX ORDER — ALPRAZOLAM 0.25 MG
1 TABLET ORAL
Qty: 1 | Refills: 0
Start: 2023-04-24 | End: 2023-04-24

## 2023-04-24 RX ORDER — SERTRALINE 25 MG/1
1 TABLET, FILM COATED ORAL
Qty: 15 | Refills: 0
Start: 2023-04-24 | End: 2023-05-08

## 2023-04-24 RX ADMIN — ENOXAPARIN SODIUM 40 MILLIGRAM(S): 100 INJECTION SUBCUTANEOUS at 13:51

## 2023-04-24 RX ADMIN — Medication 100 MILLIGRAM(S): at 11:01

## 2023-04-24 RX ADMIN — Medication 1 MILLIGRAM(S): at 11:01

## 2023-04-24 RX ADMIN — Medication 0.5 MILLIGRAM(S): at 05:50

## 2023-04-24 RX ADMIN — SERTRALINE 50 MILLIGRAM(S): 25 TABLET, FILM COATED ORAL at 11:01

## 2023-04-24 NOTE — DISCHARGE NOTE PROVIDER - NSDCCPCAREPLAN_GEN_ALL_CORE_FT
PRINCIPAL DISCHARGE DIAGNOSIS  Diagnosis: Alcohol intoxication  Assessment and Plan of Treatment: You were advised to abstain from ingesting alcohol in order to optimize your health and prevent adverse events. Continue to supplement with vitamins which can be found over-the-counter and follow-up with your primary care provider for further medical care - Call to make an appointment.

## 2023-04-24 NOTE — DISCHARGE NOTE PROVIDER - HOSPITAL COURSE
34 y/o female w/ pmhx of anxiety, etoh abuse presenting from home for assistance with ETOH cessation. pt was placed on CIWA protocol and placed on ativan taper which she completed. pt with placement in an in patient etoh detox facility. pt was hypokalemic which she was supplemented. pt LFTs elevated - which is secondary to ETOH use, monitor for now and etoh cessation. pt hospital course was otherwise unremarkable.     All 10 systems reviewed and found to be negative with the exception of what has been described above.    Vital Signs Last 24 Hrs  T(C): 36.6 (24 Apr 2023 14:01), Max: 37.2 (23 Apr 2023 22:44)  T(F): 97.8 (24 Apr 2023 14:01), Max: 99 (23 Apr 2023 22:44)  HR: 80 (24 Apr 2023 14:01) (80 - 85)  BP: 119/74 (24 Apr 2023 14:01) (110/70 - 124/81)  BP(mean): 90 (23 Apr 2023 16:02) (90 - 90)  RR: 18 (24 Apr 2023 14:01) (16 - 18)  SpO2: 100% (24 Apr 2023 14:01) (96% - 100%) room air    PHYSICAL EXAM:    GENERAL: NAD, well-groomed, well-developed  NERVOUS SYSTEM:  Alert & Oriented X3, Motor Strength 5/5 B/L upper and lower extremities; DTRs 2+ intact and symmetric  CHEST/LUNG: Clear to auscultation bilaterally; No rales, rhonchi, wheezing, or rubs  HEART: Regular rate and rhythm; No murmurs, rubs, or gallops  ABDOMEN: Soft, Nontender, Nondistended; Bowel sounds present  EXTREMITIES:  2+ Peripheral Pulses, No clubbing, cyanosis, or edema    133<L>  |  108  |  4<L>  ----------------------------<  97  3.4<L>   |  20<L>  |  0.50    Ca    8.8      21 Apr 2023 07:26  Phos  2.6     04-21  Mg     1.8     04-20    TPro  6.6  /  Alb  3.1<L>  /  TBili  1.2  /  DBili  0.5<H>  /  AST  221<H>  /  ALT  117<H>  /  AlkPhos  62  04-19    *ETOH wd  completed CIWA  cont /w folate & thiamine  pt accepted to in patient etoh rehab facilityu     * Abnormal LFT sec to ETOH abuse   monitor in out pt labs      32 y/o female w/ pmhx of anxiety, etoh abuse presenting from home for assistance with ETOH cessation. pt was placed on CIWA protocol and placed on ativan taper which she completed. pt with placement in an in patient etoh detox facility. pt was hypokalemic which she was supplemented. pt LFTs elevated - which is secondary to ETOH use, monitor for now and etoh cessation. pt hospital course was otherwise unremarkable.     All 10 systems reviewed and found to be negative with the exception of what has been described above.    Vital Signs Last 24 Hrs  T(C): 36.6 (24 Apr 2023 14:01), Max: 37.2 (23 Apr 2023 22:44)  T(F): 97.8 (24 Apr 2023 14:01), Max: 99 (23 Apr 2023 22:44)  HR: 80 (24 Apr 2023 14:01) (80 - 85)  BP: 119/74 (24 Apr 2023 14:01) (110/70 - 124/81)  BP(mean): 90 (23 Apr 2023 16:02) (90 - 90)  RR: 18 (24 Apr 2023 14:01) (16 - 18)  SpO2: 100% (24 Apr 2023 14:01) (96% - 100%) room air    PHYSICAL EXAM:    GENERAL: NAD, well-groomed, well-developed  NERVOUS SYSTEM:  Alert & Oriented X3, Motor Strength 5/5 B/L upper and lower extremities; DTRs 2+ intact and symmetric  CHEST/LUNG: Clear to auscultation bilaterally; No rales, rhonchi, wheezing, or rubs  HEART: Regular rate and rhythm; No murmurs, rubs, or gallops  ABDOMEN: Soft, Nontender, Nondistended; Bowel sounds present  EXTREMITIES:  2+ Peripheral Pulses, No clubbing, cyanosis, or edema    133<L>  |  108  |  4<L>  ----------------------------<  97  3.4<L>   |  20<L>  |  0.50    Ca    8.8      21 Apr 2023 07:26  Phos  2.6     04-21  Mg     1.8     04-20    TPro  6.6  /  Alb  3.1<L>  /  TBili  1.2  /  DBili  0.5<H>  /  AST  221<H>  /  ALT  117<H>  /  AlkPhos  62  04-19    *ETOH wd  completed CIWA  cont /w folate & thiamine  pt accepted to in patient etoh rehab facilityu     * Abnormal LFT sec to ETOH abuse   monitor in out pt labs     Attending note: Patient seen and examined with TAL Pugh  Case reviewed and discussed with her and necessary changes were made  Agree with her assessment and plan.   Spent more than 30 min to prepare the discharge 34 y/o female w/ pmhx of anxiety, etoh abuse presenting from home for assistance with ETOH cessation. pt was placed on CIWA protocol and placed on ativan taper which she completed. pt with placement in an in patient etoh detox facility. pt was hypokalemic which she was supplemented. pt LFTs elevated - which is secondary to ETOH use, monitor for now and etoh cessation. pt hospital course was otherwise unremarkable.     All 10 systems reviewed and found to be negative with the exception of what has been described above.    Vital Signs Last 24 Hrs  T(C): 36.6 (24 Apr 2023 14:01), Max: 37.2 (23 Apr 2023 22:44)  T(F): 97.8 (24 Apr 2023 14:01), Max: 99 (23 Apr 2023 22:44)  HR: 80 (24 Apr 2023 14:01) (80 - 85)  BP: 119/74 (24 Apr 2023 14:01) (110/70 - 124/81)  BP(mean): 90 (23 Apr 2023 16:02) (90 - 90)  RR: 18 (24 Apr 2023 14:01) (16 - 18)  SpO2: 100% (24 Apr 2023 14:01) (96% - 100%) room air    PHYSICAL EXAM:    GENERAL: NAD, well-groomed, well-developed  NERVOUS SYSTEM:  Alert & Oriented X3, Motor Strength 5/5 B/L upper and lower extremities; DTRs 2+ intact and symmetric  CHEST/LUNG: Clear to auscultation bilaterally; No rales, rhonchi, wheezing, or rubs  HEART: Regular rate and rhythm; No murmurs, rubs, or gallops  ABDOMEN: Soft, Nontender, Nondistended; Bowel sounds present  EXTREMITIES:  2+ Peripheral Pulses, No clubbing, cyanosis, or edema    133<L>  |  108  |  4<L>  ----------------------------<  97  3.4<L>   |  20<L>  |  0.50    Ca    8.8      21 Apr 2023 07:26  Phos  2.6     04-21  Mg     1.8     04-20    TPro  6.6  /  Alb  3.1<L>  /  TBili  1.2  /  DBili  0.5<H>  /  AST  221<H>  /  ALT  117<H>  /  AlkPhos  62  04-19    *ETOH wd  completed CIWA  cont /w folate & thiamine  pt accepted to in patient etoh rehab facilityu     Hypontremia/hypophosphatemia-  Replaced    * Abnormal LFT sec to ETOH abuse   monitor in out pt labs     Attending note: Patient seen and examined with TAL Pugh  Case reviewed and discussed with her and necessary changes were made  Agree with her assessment and plan.   Spent more than 30 min to prepare the discharge

## 2023-04-24 NOTE — DISCHARGE NOTE NURSING/CASE MANAGEMENT/SOCIAL WORK - NSDCVIVACCINE_GEN_ALL_CORE_FT
Tdap; 26-Apr-2022 18:42; Conchis Saenz (RN); [employer]; [id number] (Exp. Date: 21-Jan-2024); IntraMuscular; Deltoid Left.; 0.5 milliLiter(s); VIS (VIS Published: 09-May-2013, VIS Presented: 26-Apr-2022);

## 2023-04-24 NOTE — BH CONSULTATION LIAISON ASSESSMENT NOTE - CURRENT MEDICATION
MEDICATIONS  (STANDING):  enoxaparin Injectable 40 milliGRAM(s) SubCutaneous every 24 hours  folic acid 1 milliGRAM(s) Oral daily  LORazepam   Injectable   IV Push   LORazepam   Injectable 0.5 milliGRAM(s) IV Push every 12 hours  sertraline 50 milliGRAM(s) Oral daily  thiamine 100 milliGRAM(s) Oral daily    MEDICATIONS  (PRN):  LORazepam   Injectable 2 milliGRAM(s) IV Push every 1 hour PRN Symptom-triggered: each CIWA -Ar score 8 or GREATER  LORazepam   Injectable 2 milliGRAM(s) IV Push every 2 hours PRN CIWA-Ar score increase by 2 points and a total score of 7 or less  ondansetron Injectable 4 milliGRAM(s) IV Push every 6 hours PRN Nausea and/or Vomiting

## 2023-04-24 NOTE — BH CONSULTATION LIAISON ASSESSMENT NOTE - RISK ASSESSMENT
LOW RISK     ACUTE RISK FACTORS: Detox    CHRONIC RISK FACTORS: EtOH abuse    PROTECTIVE FACTORS: No suicide attempts, no violence history, medication compliance, no access to guns, no global insomnia, supportive family, willingness to seek help, no suicidal ideation or homicidal ideation, hopefulness for future.

## 2023-04-24 NOTE — BH CONSULTATION LIAISON ASSESSMENT NOTE - NSBHATTESTAPPBILLTIME_PSY_A_CORE
I attest my time as EWA is greater than 50% of the total combined time spent on qualifying patient care activities. I have reviewed and verified the documentation.

## 2023-04-24 NOTE — DISCHARGE NOTE PROVIDER - NSDCCAREPROVSEEN_GEN_ALL_CORE_FT
Svetlana, Asmita Bae, Regine Yoon, Jamila Pugh, Tigist Wallace, Laura Garcia, Janet Ayala, Fatoumata

## 2023-04-24 NOTE — BH CONSULTATION LIAISON ASSESSMENT NOTE - HPI (INCLUDE ILLNESS QUALITY, SEVERITY, DURATION, TIMING, CONTEXT, MODIFYING FACTORS, ASSOCIATED SIGNS AND SYMPTOMS)
Patient is calm and cooperative, pleasant; linear, organized. Patient seen at bedside for psychiatric evaluation, although presented to hospital with EtOH intoxication, was requesting rehab however and was medically admitted for detox. Patient denies SHIIP. Denies any psychiatric history, except some "regular anxiety." No history or inpatient hospitalizations. No signs or symptoms of carmella or psychosis. Reports good sleep and appetite. Denies depressed mood or anhedonia, hopelessness or suicidal ideation. Patient has no presence of thought disorder. No delusions elicited. Tolerating medications with no side effects; none observed. Will be discharged today and attending inpatient rehab at [facility]. Patient is not an acute threat to self of others and does not warrant for an inpatient admission.

## 2023-04-24 NOTE — BH CONSULTATION LIAISON ASSESSMENT NOTE - VIOLENCE PROTECTIVE FACTORS:
Residential stability/Relationship stability/Employment stability/Affective Stability/Insight into violence risk and need for management/treatment/Good treatment response/compliance

## 2023-04-24 NOTE — BH CONSULTATION LIAISON ASSESSMENT NOTE - PATIENT'S CHIEF COMPLAINT
"Everything is set up, I'm going to rehab in [place]. I don't know why they asked you to speak to me." "Everything is set up, I'm going to rehab in [place]. I'm excited to get my life back."

## 2023-04-24 NOTE — BH CONSULTATION LIAISON ASSESSMENT NOTE - NSSUICPROTFACT_PSY_ALL_CORE
Responsibility to children, family, or others/Identifies reasons for living/Supportive social network of family or friends/Positive therapeutic relationships/Ability to cope with stress/Frustration tolerance

## 2023-04-24 NOTE — DISCHARGE NOTE PROVIDER - NSDCMRMEDTOKEN_GEN_ALL_CORE_FT
diazePAM 5 mg oral tablet: 1 tab(s) orally 2 times a day  folic acid 1 mg oral tablet: 1 tab(s) orally once a day  Junel 1/20 oral tablet: 1 tab(s) orally once a day ***patients mother will bring in***  thiamine 100 mg oral tablet: 1 tab(s) orally once a day  Xanax 0.25 mg oral tablet: 1 tab(s) orally once a day MDD: 1  Zoloft 50 mg oral tablet: 1 tab(s) orally once a day

## 2023-04-24 NOTE — BH CONSULTATION LIAISON ASSESSMENT NOTE - NSBHCHARTREVIEWVS_PSY_A_CORE FT
Vital Signs Last 24 Hrs  T(C): 37 (24 Apr 2023 10:00), Max: 37.2 (23 Apr 2023 22:44)  T(F): 98.6 (24 Apr 2023 10:00), Max: 99 (23 Apr 2023 22:44)  HR: 83 (24 Apr 2023 10:00) (80 - 85)  BP: 110/70 (24 Apr 2023 10:00) (110/70 - 124/81)  BP(mean): 90 (23 Apr 2023 16:02) (90 - 90)  RR: 18 (24 Apr 2023 10:00) (16 - 18)  SpO2: 99% (24 Apr 2023 10:00) (96% - 100%)    Parameters below as of 24 Apr 2023 10:00  Patient On (Oxygen Delivery Method): room air

## 2023-04-24 NOTE — DISCHARGE NOTE NURSING/CASE MANAGEMENT/SOCIAL WORK - PATIENT PORTAL LINK FT
You can access the FollowMyHealth Patient Portal offered by Health system by registering at the following website: [email or web address]. By joining [employer]’s FollowMyHealth portal, you will also be able to view your health information using other applications (apps) compatible with our system.

## 2023-04-24 NOTE — BH CONSULTATION LIAISON ASSESSMENT NOTE - NSBHREFERDETAILS_PSY_A_CORE_FT
"eval for possible admission to [facility] , hx of non adherence to medications"

## 2023-04-24 NOTE — BH CONSULTATION LIAISON ASSESSMENT NOTE - SUMMARY
Patient is calm and cooperative, pleasant; linear, organized. Patient seen at bedside for psychiatric evaluation, although presented to hospital with EtOH intoxication, was requesting rehab however and was medically admitted for detox. Patient denies SHIIP. Denies any psychiatric history, except some "regular anxiety." No history or inpatient hospitalizations. No signs or symptoms of carmella or psychosis. Reports good sleep and appetite. Denies depressed mood or anhedonia, hopelessness or suicidal ideation. Patient has no presence of thought disorder. No delusions elicited. Tolerating medications with no side effects; none observed. Will be discharged today and attending inpatient rehab at [facility]. Patient is not an acute threat to self of others and does not warrant for an inpatient admission.    Plan:    1) D/C to inpatient rehab facility.

## 2023-05-01 NOTE — CDI QUERY NOTE - NSCDIOTHERTXTBX_GEN_ALL_CORE_HH
This patient was found to have low sodium labs and was given a NS bolus.    Is there a clinically significant diagnosis associated with the labs and treatment.    - Hyponatremia  - Unable to determine  - Other ( Please specify)      CLINICAL INDICATORS:    NS 1 liter fluid bolus on 4/19/2023    Sodium, Serum: 138 mmol/L (04.22.23 @ 07:26)   Sodium, Serum: 133 mmol/L (04.21.23 @ 07:26)   Sodium, Serum: 133 mmol/L (04.20.23 @ 08:12)   Sodium, Serum: 130 mmol/L (04.19.23 @ 13:55  Sodium, Serum: 142 mmol/L (04.18.23 @ 21:54)

## 2023-05-01 NOTE — CDI QUERY NOTE - NSCDIOTHERTXTBX2_GEN_ALL_CORE_FT
This patient was found to have low phosphorous labs and was given potassium phosphate intravenous.    Is there a clinically significant diagnosis associated with the labs and treatment.    - Hypophosphatemia  - Unable to determine  - Other ( Please specify)      CLINICAL INDICATORS:    potassium phosphate IVPB   83.33 mL/Hr IV Intermittent (04-20-23)      Phosphorus Level, Serum: 2.6 mg/dL (04.21.23 @ 07:26)   Phosphorus Level, Serum: 2.1 mg/dL (04.20.23 @ 08:12)   Phosphorus Level, Serum: 1.9 mg/dL (04.19.23 @ 13:55)

## 2023-05-18 NOTE — ED ADULT NURSE REASSESSMENT NOTE - CONDITION
Home regimen: lisinopril 20mg    - HOLD in the setting of current normotension and DEVANTE   improved

## 2023-05-27 ENCOUNTER — EMERGENCY (EMERGENCY)
Facility: HOSPITAL | Age: 33
LOS: 0 days | Discharge: HOME | End: 2023-05-27
Attending: STUDENT IN AN ORGANIZED HEALTH CARE EDUCATION/TRAINING PROGRAM
Payer: MEDICAID

## 2023-05-27 VITALS
SYSTOLIC BLOOD PRESSURE: 152 MMHG | OXYGEN SATURATION: 100 % | HEART RATE: 93 BPM | DIASTOLIC BLOOD PRESSURE: 90 MMHG | RESPIRATION RATE: 18 BRPM | TEMPERATURE: 98 F

## 2023-05-27 VITALS — HEIGHT: 61 IN

## 2023-05-27 DIAGNOSIS — R00.0 TACHYCARDIA, UNSPECIFIED: ICD-10-CM

## 2023-05-27 DIAGNOSIS — E83.42 HYPOMAGNESEMIA: ICD-10-CM

## 2023-05-27 DIAGNOSIS — Z88.5 ALLERGY STATUS TO NARCOTIC AGENT: ICD-10-CM

## 2023-05-27 DIAGNOSIS — Z88.1 ALLERGY STATUS TO OTHER ANTIBIOTIC AGENTS STATUS: ICD-10-CM

## 2023-05-27 DIAGNOSIS — R11.2 NAUSEA WITH VOMITING, UNSPECIFIED: ICD-10-CM

## 2023-05-27 DIAGNOSIS — Z88.2 ALLERGY STATUS TO SULFONAMIDES: ICD-10-CM

## 2023-05-27 DIAGNOSIS — Z87.19 PERSONAL HISTORY OF OTHER DISEASES OF THE DIGESTIVE SYSTEM: ICD-10-CM

## 2023-05-27 DIAGNOSIS — R10.9 UNSPECIFIED ABDOMINAL PAIN: ICD-10-CM

## 2023-05-27 LAB
ADD ON TEST-SPECIMEN IN LAB: SIGNIFICANT CHANGE UP
ALBUMIN SERPL ELPH-MCNC: 4 G/DL — SIGNIFICANT CHANGE UP (ref 3.3–5)
ALP SERPL-CCNC: 58 U/L — SIGNIFICANT CHANGE UP (ref 40–120)
ALT FLD-CCNC: 22 U/L — SIGNIFICANT CHANGE UP (ref 12–78)
ANION GAP SERPL CALC-SCNC: 11 MMOL/L — SIGNIFICANT CHANGE UP (ref 5–17)
APPEARANCE UR: CLEAR — SIGNIFICANT CHANGE UP
APTT BLD: 30.9 SEC — SIGNIFICANT CHANGE UP (ref 27.5–35.5)
AST SERPL-CCNC: 20 U/L — SIGNIFICANT CHANGE UP (ref 15–37)
BACTERIA # UR AUTO: ABNORMAL
BASOPHILS # BLD AUTO: 0.05 K/UL — SIGNIFICANT CHANGE UP (ref 0–0.2)
BASOPHILS NFR BLD AUTO: 0.4 % — SIGNIFICANT CHANGE UP (ref 0–2)
BILIRUB SERPL-MCNC: 0.4 MG/DL — SIGNIFICANT CHANGE UP (ref 0.2–1.2)
BILIRUB UR-MCNC: NEGATIVE — SIGNIFICANT CHANGE UP
BUN SERPL-MCNC: 8 MG/DL — SIGNIFICANT CHANGE UP (ref 7–23)
CALCIUM SERPL-MCNC: 9.6 MG/DL — SIGNIFICANT CHANGE UP (ref 8.5–10.1)
CHLORIDE SERPL-SCNC: 104 MMOL/L — SIGNIFICANT CHANGE UP (ref 96–108)
CO2 SERPL-SCNC: 24 MMOL/L — SIGNIFICANT CHANGE UP (ref 22–31)
COLOR SPEC: YELLOW — SIGNIFICANT CHANGE UP
CREAT SERPL-MCNC: 0.81 MG/DL — SIGNIFICANT CHANGE UP (ref 0.5–1.3)
DIFF PNL FLD: ABNORMAL
EGFR: 98 ML/MIN/1.73M2 — SIGNIFICANT CHANGE UP
EOSINOPHIL # BLD AUTO: 0 K/UL — SIGNIFICANT CHANGE UP (ref 0–0.5)
EOSINOPHIL NFR BLD AUTO: 0 % — SIGNIFICANT CHANGE UP (ref 0–6)
EPI CELLS # UR: SIGNIFICANT CHANGE UP
GLUCOSE BLDC GLUCOMTR-MCNC: 110 MG/DL — HIGH (ref 70–99)
GLUCOSE SERPL-MCNC: 125 MG/DL — HIGH (ref 70–99)
GLUCOSE UR QL: NEGATIVE — SIGNIFICANT CHANGE UP
HCG SERPL-ACNC: <1 MIU/ML — SIGNIFICANT CHANGE UP
HCT VFR BLD CALC: 41.7 % — SIGNIFICANT CHANGE UP (ref 34.5–45)
HGB BLD-MCNC: 14 G/DL — SIGNIFICANT CHANGE UP (ref 11.5–15.5)
IMM GRANULOCYTES NFR BLD AUTO: 0.3 % — SIGNIFICANT CHANGE UP (ref 0–0.9)
INR BLD: 1.08 RATIO — SIGNIFICANT CHANGE UP (ref 0.88–1.16)
KETONES UR-MCNC: ABNORMAL
LEUKOCYTE ESTERASE UR-ACNC: NEGATIVE — SIGNIFICANT CHANGE UP
LIDOCAIN IGE QN: 95 U/L — SIGNIFICANT CHANGE UP (ref 73–393)
LYMPHOCYTES # BLD AUTO: 1.59 K/UL — SIGNIFICANT CHANGE UP (ref 1–3.3)
LYMPHOCYTES # BLD AUTO: 13.9 % — SIGNIFICANT CHANGE UP (ref 13–44)
MAGNESIUM SERPL-MCNC: 1.5 MG/DL — LOW (ref 1.6–2.6)
MCHC RBC-ENTMCNC: 30.8 PG — SIGNIFICANT CHANGE UP (ref 27–34)
MCHC RBC-ENTMCNC: 33.6 GM/DL — SIGNIFICANT CHANGE UP (ref 32–36)
MCV RBC AUTO: 91.6 FL — SIGNIFICANT CHANGE UP (ref 80–100)
MONOCYTES # BLD AUTO: 0.43 K/UL — SIGNIFICANT CHANGE UP (ref 0–0.9)
MONOCYTES NFR BLD AUTO: 3.7 % — SIGNIFICANT CHANGE UP (ref 2–14)
NEUTROPHILS # BLD AUTO: 9.37 K/UL — HIGH (ref 1.8–7.4)
NEUTROPHILS NFR BLD AUTO: 81.7 % — HIGH (ref 43–77)
NITRITE UR-MCNC: NEGATIVE — SIGNIFICANT CHANGE UP
PH UR: 7 — SIGNIFICANT CHANGE UP (ref 5–8)
PLATELET # BLD AUTO: 354 K/UL — SIGNIFICANT CHANGE UP (ref 150–400)
POTASSIUM SERPL-MCNC: 3.6 MMOL/L — SIGNIFICANT CHANGE UP (ref 3.5–5.3)
POTASSIUM SERPL-SCNC: 3.6 MMOL/L — SIGNIFICANT CHANGE UP (ref 3.5–5.3)
PROT SERPL-MCNC: 8.3 GM/DL — SIGNIFICANT CHANGE UP (ref 6–8.3)
PROT UR-MCNC: NEGATIVE — SIGNIFICANT CHANGE UP
PROTHROM AB SERPL-ACNC: 12.5 SEC — SIGNIFICANT CHANGE UP (ref 10.5–13.4)
RBC # BLD: 4.55 M/UL — SIGNIFICANT CHANGE UP (ref 3.8–5.2)
RBC # FLD: 13.5 % — SIGNIFICANT CHANGE UP (ref 10.3–14.5)
RBC CASTS # UR COMP ASSIST: SIGNIFICANT CHANGE UP /HPF (ref 0–4)
SODIUM SERPL-SCNC: 139 MMOL/L — SIGNIFICANT CHANGE UP (ref 135–145)
SP GR SPEC: 1.01 — SIGNIFICANT CHANGE UP (ref 1.01–1.02)
TSH SERPL-MCNC: 3.95 UU/ML — SIGNIFICANT CHANGE UP (ref 0.34–4.82)
UROBILINOGEN FLD QL: NEGATIVE — SIGNIFICANT CHANGE UP
WBC # BLD: 11.47 K/UL — HIGH (ref 3.8–10.5)
WBC # FLD AUTO: 11.47 K/UL — HIGH (ref 3.8–10.5)
WBC UR QL: NEGATIVE /HPF — SIGNIFICANT CHANGE UP (ref 0–5)

## 2023-05-27 PROCEDURE — 99285 EMERGENCY DEPT VISIT HI MDM: CPT

## 2023-05-27 PROCEDURE — 81001 URINALYSIS AUTO W/SCOPE: CPT

## 2023-05-27 PROCEDURE — 36415 COLL VENOUS BLD VENIPUNCTURE: CPT

## 2023-05-27 PROCEDURE — 96376 TX/PRO/DX INJ SAME DRUG ADON: CPT

## 2023-05-27 PROCEDURE — 84443 ASSAY THYROID STIM HORMONE: CPT

## 2023-05-27 PROCEDURE — 84702 CHORIONIC GONADOTROPIN TEST: CPT

## 2023-05-27 PROCEDURE — 80053 COMPREHEN METABOLIC PANEL: CPT

## 2023-05-27 PROCEDURE — C9113: CPT

## 2023-05-27 PROCEDURE — 74177 CT ABD & PELVIS W/CONTRAST: CPT | Mod: 26,MA

## 2023-05-27 PROCEDURE — 93010 ELECTROCARDIOGRAM REPORT: CPT

## 2023-05-27 PROCEDURE — 93005 ELECTROCARDIOGRAM TRACING: CPT

## 2023-05-27 PROCEDURE — 96374 THER/PROPH/DIAG INJ IV PUSH: CPT | Mod: XU

## 2023-05-27 PROCEDURE — 83690 ASSAY OF LIPASE: CPT

## 2023-05-27 PROCEDURE — 83735 ASSAY OF MAGNESIUM: CPT

## 2023-05-27 PROCEDURE — 99285 EMERGENCY DEPT VISIT HI MDM: CPT | Mod: 25

## 2023-05-27 PROCEDURE — 96375 TX/PRO/DX INJ NEW DRUG ADDON: CPT

## 2023-05-27 PROCEDURE — 96372 THER/PROPH/DIAG INJ SC/IM: CPT

## 2023-05-27 PROCEDURE — 74177 CT ABD & PELVIS W/CONTRAST: CPT | Mod: MA

## 2023-05-27 PROCEDURE — 85730 THROMBOPLASTIN TIME PARTIAL: CPT

## 2023-05-27 PROCEDURE — 85610 PROTHROMBIN TIME: CPT

## 2023-05-27 PROCEDURE — 85025 COMPLETE CBC W/AUTO DIFF WBC: CPT

## 2023-05-27 PROCEDURE — 82962 GLUCOSE BLOOD TEST: CPT

## 2023-05-27 RX ORDER — MAGNESIUM SULFATE 500 MG/ML
1 VIAL (ML) INJECTION ONCE
Refills: 0 | Status: COMPLETED | OUTPATIENT
Start: 2023-05-27 | End: 2023-05-27

## 2023-05-27 RX ORDER — PANTOPRAZOLE SODIUM 20 MG/1
40 TABLET, DELAYED RELEASE ORAL ONCE
Refills: 0 | Status: COMPLETED | OUTPATIENT
Start: 2023-05-27 | End: 2023-05-27

## 2023-05-27 RX ORDER — ONDANSETRON 8 MG/1
4 TABLET, FILM COATED ORAL ONCE
Refills: 0 | Status: COMPLETED | OUTPATIENT
Start: 2023-05-27 | End: 2023-05-27

## 2023-05-27 RX ORDER — SODIUM CHLORIDE 9 MG/ML
1000 INJECTION INTRAMUSCULAR; INTRAVENOUS; SUBCUTANEOUS ONCE
Refills: 0 | Status: COMPLETED | OUTPATIENT
Start: 2023-05-27 | End: 2023-05-27

## 2023-05-27 RX ORDER — ONDANSETRON 8 MG/1
1 TABLET, FILM COATED ORAL
Qty: 8 | Refills: 0
Start: 2023-05-27

## 2023-05-27 RX ADMIN — Medication 1 MILLIGRAM(S): at 13:09

## 2023-05-27 RX ADMIN — ONDANSETRON 4 MILLIGRAM(S): 8 TABLET, FILM COATED ORAL at 07:59

## 2023-05-27 RX ADMIN — Medication 100 GRAM(S): at 09:49

## 2023-05-27 RX ADMIN — Medication 200 MILLIGRAM(S): at 11:50

## 2023-05-27 RX ADMIN — SODIUM CHLORIDE 2000 MILLILITER(S): 9 INJECTION INTRAMUSCULAR; INTRAVENOUS; SUBCUTANEOUS at 07:59

## 2023-05-27 RX ADMIN — ONDANSETRON 4 MILLIGRAM(S): 8 TABLET, FILM COATED ORAL at 10:14

## 2023-05-27 RX ADMIN — SODIUM CHLORIDE 1000 MILLILITER(S): 9 INJECTION INTRAMUSCULAR; INTRAVENOUS; SUBCUTANEOUS at 10:14

## 2023-05-27 RX ADMIN — PANTOPRAZOLE SODIUM 40 MILLIGRAM(S): 20 TABLET, DELAYED RELEASE ORAL at 07:59

## 2023-05-27 NOTE — ED PROVIDER NOTE - CLINICAL SUMMARY MEDICAL DECISION MAKING FREE TEXT BOX
Patient here with concern for nausea vomiting after dinner last night.  Concern for gastritis versus pancreatitis versus biliary pathology.  The less likely biliary pathology without any abdominal pain.  Do not suspect esophageal varices given that she only started throwing up red after drinking red Gatorade and she has no prior history of this per patient.  She is also not actively vomiting at this time.  Patient denies recent alcohol use, do not suspect any withdrawal.  Possible foodborne GI illness causing the symptoms.  Plan on lab work, IV fluids, Zofran, Protonix.

## 2023-05-27 NOTE — ED PROVIDER NOTE - NSFOLLOWUPCLINICS_GEN_ALL_ED_FT
[facility]  Family Medicine  [address] [place] [address]  Phone: [phone]  Fax:   Follow Up Time: 4-6 Days    [facility] Dept of Substance Abuse  Psychiatry Substance Abuse  [address] [place] [address]  Phone: [phone]  Fax:     [facility] Psychiatry  Psychiatry  [address] [place] [address]  Phone: [phone]  Fax:

## 2023-05-27 NOTE — ED ADULT TRIAGE NOTE - CHIEF COMPLAINT QUOTE
PT C/O VOMITTING SINCE LAST NIGHT. DENIES CP/SOB/D/FEVER/CHILLS. PT POOR HISTORIAN. STATES ITS ALL IN THE COMPUTER.

## 2023-05-27 NOTE — ED PROVIDER NOTE - PROGRESS NOTE DETAILS
Patient with minimal improvement in symptoms.  Given this, will add on CT scan of the abdomen pelvis to evaluate for other pathology at this time.  Izaiah Bell MD. Patient feeling better at this time after Ativan.  I did have prolonged discussion with patient and father at bedside regarding possibility of mild benzodiazepine withdrawal.  Patient last use Xanax a few days ago.  States that she uses it somewhat regularly.  Since her imaging and lab work-up was grossly unremarkable and she felt better with Ativan, suspect possible mild withdrawal at this time.  States that she has been on it for many years but recently lost her psychiatrist.  We will give a prescription at this time and have her follow-up with a new psychiatrist.  Plan to discharge patient. Return to ED precautions were discussed with the patient/family. All questions were answered. Izaiah Bell MD.

## 2023-05-27 NOTE — ED ADULT NURSE NOTE - OBJECTIVE STATEMENT
33 yof reports n/v last night, multiple episodes following eating dinner. Denies abd pain, denies other pain, admits to racing heart "like a panic attack" at onset of symptoms but resolved on its own shortly after. Admits hx of ulcer. Father holding bucket from home in which there is thin red vomit, pt states it is red gatorade that she vomited up.

## 2023-05-27 NOTE — ED PROVIDER NOTE - OBJECTIVE STATEMENT
Patient with a past medical history of acid reflux, prior alcohol use with last drink 1.5 months ago, vertigo is presenting with concern for nausea and vomiting.  States started last night after dinner.  Has been constant.   feels very weak because of this.  Has had multiple episodes of vomiting since then.  No change in bowel movements.  Denies any abdominal pain.  Father gave her a red Gatorade tonight which she has since been spitting up.  Prior to that she did not see any blood in her emesis.  No reported blood in stool.   has had endoscopy before that showed gastric ulcer but no history of varices. Patient with a past medical history of acid reflux, prior alcohol use with last drink 1.5 months ago, vertigo is presenting with concern for nausea and vomiting.  States started last night after dinner.  Has been constant.   feels very weak because of this.  Has had multiple episodes of vomiting since then.  No change in bowel movements.  Denies any abdominal pain.  Father gave her a red Gatorade tonight which she has since been spitting up.  Prior to that she did not see any blood in her emesis.  No reported blood in stool.   has had endoscopy before that showed gastric ulcer but no history of varices. Despite triage note stating abdominal pain, patient declines abdominal pain to me.  States she is here for nausea and vomiting.

## 2023-05-27 NOTE — ED ADULT NURSE NOTE - NSFALLUNIVINTERV_ED_ALL_ED
Bed/Stretcher in lowest position, wheels locked, appropriate side rails in place/Call bell, personal items and telephone in reach/Instruct patient to call for assistance before getting out of bed/chair/stretcher/Non-slip footwear applied when patient is off stretcher/[place] to call system/Physically safe environment - no spills, clutter or unnecessary equipment/Purposeful proactive rounding/Room/bathroom lighting operational, light cord in reach

## 2023-05-27 NOTE — ED PROVIDER NOTE - PHYSICAL EXAMINATION
Constitutional: Awake, Alert, non-toxic  HEAD: Normocephalic, atraumatic.   EYES: PERRL, EOM intact, conjunctiva and sclera are clear bilaterally.  ENT: External ears normal. No rhinorrhea, no tracheal deviation   NECK: Supple, non-tender  CARDIOVASCULAR: regular rate and rhythm.  RESPIRATORY: Normal respiratory effort; breath sounds CTAB, no wheezes, rhonchi, or rales. Speaking in full sentences. No accessory muscle use.   ABDOMEN: Soft; non-tender, non-distended. No rebound or guarding.   MSK:  no lower extremity edema, no deformities  SKIN: Warm, dry  NEURO: A&O x3. Sensory and motor functions are grossly intact. Speech is normal. No facial droop.  PSYCH: Appearance and judgement seem appropriate for gender and age.

## 2023-05-27 NOTE — ED PROVIDER NOTE - PATIENT PORTAL LINK FT
You can access the FollowMyHealth Patient Portal offered by [facility] by registering at the following website: [email or web address]. By joining [employer]’s FollowMyHealth portal, you will also be able to view your health information using other applications (apps) compatible with our system.

## 2023-05-27 NOTE — ED PROVIDER NOTE - NSFOLLOWUPINSTRUCTIONS_ED_ALL_ED_FT
Acute Nausea and Vomiting    WHAT YOU NEED TO KNOW:    What does acute mean? Acute means the nausea and vomiting starts suddenly, gets worse quickly, and lasts a short time.    What are some common causes of acute nausea and vomiting?    Food poisoning    Large amounts of alcohol    Certain medicines, too much of any medicine, or stopping a regular medicine too quickly    Early stages of pregnancy    Infection in the stomach, intestines, or other organs    Trauma to the head    Anxiety or stress    Gastroparesis (a condition that prevents your stomach from emptying properly)    Metabolic disorders, such as uremia or adrenal insufficiency  What causes acute nausea and vomiting with other signs and symptoms? You may have stomach pain or problems with digestion. You may be sweating, have pale skin, and more saliva than usual. These signs and symptoms may be caused by the following:    Problems with your heart rate, blood flow to your heart muscle, blood pressure, or stomach fluid    Increased pressure or bleeding in the brain    Swelling of the tissue covering the brain    Migraine or seizures    Inner ear disorders that cause problems with balance    Inflammation of the appendix, gallbladder, stomach, pancreas, kidneys, or other organs    Bacteria or a parasite in the digestive system    Heart attack    Stomach ulcers, or bowel blockage or twisting  How is the cause of acute nausea and vomiting diagnosed? Your healthcare provider will examine you and ask about your symptoms. Tell him or her if the vomiting was before, during, or after a meal. Your provider may ask what medicines you take, including over-the-counter medicines. You may need blood tests to check for infection or inflammation.    How is acute nausea and vomiting treated? Vomiting may go away on its own. The goal of treatment is to prevent dehydration. Treatment also depends on the cause of the nausea and vomiting. Any medical condition causing your nausea and vomiting will also be treated. You may need one or more of the following:    Medicines may be given to calm your stomach and stop your vomiting. You may also need medicines to help empty your stomach and bowels.    IV fluids may be given to replace lost fluids and electrolytes. This may be needed it you cannot drink liquids.    A nasogastric (NG) tube may be needed if your nausea and vomiting is severe. The NG tube is put into your nose and moved down your throat until it reaches your stomach. Liquid, nutrition, or medicine may be given through an NG tube. The tube may instead be attached to suction if healthcare providers need to keep your stomach empty.  What can I do to manage acute nausea and vomiting?    Rest as much as you can. Too much activity can make your nausea worse.    Drink more liquids to prevent dehydration. Take small sips. Try drinks such as ginger ale, lemonade, water, or tea. Your provider may recommend that you drink an oral rehydration solution (ORS). ORS contains water, salts, and sugar that are needed to replace the lost body fluids.    Eat smaller meals, more often. Try bland foods and avoid spices or strong flavors    Do not drink alcohol. Alcohol may upset or irritate your stomach.  Call your local emergency number (911 in the [place]) if:    You have chest pain.    You have severe pain or cramping in your abdomen.    Your vision is blurred.    You are confused, have a high fever, or a stiff neck.    You have bright red blood coming from your rectum.    Your vomit smells like bowel movement.  When should I seek immediate care?    You have a severe headache or pain.    You are dizzy, cold, and thirsty, and your eyes and mouth are dry.    You are urinating very little or not at all.    You are dizzy or lightheaded when you stand up.    You see blood or material that looks like coffee grounds in your vomit.  When should I call my doctor?    You continue to vomit for more than 48 hours.    Your nausea and vomiting does not get better or go away after you use medicine.    You have questions or concerns about your condition or care.  CARE AGREEMENT:    You have the right to help plan your care. Learn about your health condition and how it may be treated. Discuss treatment options with your healthcare providers to decide what care you want to receive. You always have the right to refuse treatment.

## 2023-06-02 ENCOUNTER — INPATIENT (INPATIENT)
Facility: HOSPITAL | Age: 33
LOS: 2 days | Discharge: HOME | End: 2023-06-05
Attending: INTERNAL MEDICINE | Admitting: INTERNAL MEDICINE
Payer: MEDICAID

## 2023-06-02 VITALS — HEIGHT: 61 IN | WEIGHT: 134.92 LBS

## 2023-06-02 DIAGNOSIS — F10.939 ALCOHOL USE, UNSPECIFIED WITH WITHDRAWAL, UNSPECIFIED: ICD-10-CM

## 2023-06-02 LAB
ALBUMIN SERPL ELPH-MCNC: 3.6 G/DL — SIGNIFICANT CHANGE UP (ref 3.3–5)
ALBUMIN SERPL ELPH-MCNC: 3.9 G/DL — SIGNIFICANT CHANGE UP (ref 3.3–5)
ALP SERPL-CCNC: 55 U/L — SIGNIFICANT CHANGE UP (ref 40–120)
ALP SERPL-CCNC: 58 U/L — SIGNIFICANT CHANGE UP (ref 40–120)
ALT FLD-CCNC: 17 U/L — SIGNIFICANT CHANGE UP (ref 12–78)
ALT FLD-CCNC: 19 U/L — SIGNIFICANT CHANGE UP (ref 12–78)
ANION GAP SERPL CALC-SCNC: 10 MMOL/L — SIGNIFICANT CHANGE UP (ref 5–17)
ANION GAP SERPL CALC-SCNC: 8 MMOL/L — SIGNIFICANT CHANGE UP (ref 5–17)
AST SERPL-CCNC: 14 U/L — LOW (ref 15–37)
AST SERPL-CCNC: 26 U/L — SIGNIFICANT CHANGE UP (ref 15–37)
BASOPHILS # BLD AUTO: 0.07 K/UL — SIGNIFICANT CHANGE UP (ref 0–0.2)
BASOPHILS NFR BLD AUTO: 0.6 % — SIGNIFICANT CHANGE UP (ref 0–2)
BILIRUB DIRECT SERPL-MCNC: 0.2 MG/DL — SIGNIFICANT CHANGE UP (ref 0–0.3)
BILIRUB INDIRECT FLD-MCNC: 0.6 MG/DL — SIGNIFICANT CHANGE UP (ref 0.2–1)
BILIRUB SERPL-MCNC: 0.4 MG/DL — SIGNIFICANT CHANGE UP (ref 0.2–1.2)
BILIRUB SERPL-MCNC: 0.8 MG/DL — SIGNIFICANT CHANGE UP (ref 0.2–1.2)
BUN SERPL-MCNC: 12 MG/DL — SIGNIFICANT CHANGE UP (ref 7–23)
BUN SERPL-MCNC: 8 MG/DL — SIGNIFICANT CHANGE UP (ref 7–23)
CALCIUM SERPL-MCNC: 8.7 MG/DL — SIGNIFICANT CHANGE UP (ref 8.5–10.1)
CALCIUM SERPL-MCNC: 9.2 MG/DL — SIGNIFICANT CHANGE UP (ref 8.5–10.1)
CHLORIDE SERPL-SCNC: 104 MMOL/L — SIGNIFICANT CHANGE UP (ref 96–108)
CHLORIDE SERPL-SCNC: 104 MMOL/L — SIGNIFICANT CHANGE UP (ref 96–108)
CO2 SERPL-SCNC: 24 MMOL/L — SIGNIFICANT CHANGE UP (ref 22–31)
CO2 SERPL-SCNC: 24 MMOL/L — SIGNIFICANT CHANGE UP (ref 22–31)
CREAT SERPL-MCNC: 0.74 MG/DL — SIGNIFICANT CHANGE UP (ref 0.5–1.3)
CREAT SERPL-MCNC: 0.82 MG/DL — SIGNIFICANT CHANGE UP (ref 0.5–1.3)
EGFR: 109 ML/MIN/1.73M2 — SIGNIFICANT CHANGE UP
EGFR: 96 ML/MIN/1.73M2 — SIGNIFICANT CHANGE UP
EOSINOPHIL # BLD AUTO: 0.01 K/UL — SIGNIFICANT CHANGE UP (ref 0–0.5)
EOSINOPHIL NFR BLD AUTO: 0.1 % — SIGNIFICANT CHANGE UP (ref 0–6)
GLUCOSE SERPL-MCNC: 107 MG/DL — HIGH (ref 70–99)
GLUCOSE SERPL-MCNC: 141 MG/DL — HIGH (ref 70–99)
HCG SERPL-ACNC: <1 MIU/ML — SIGNIFICANT CHANGE UP
HCT VFR BLD CALC: 39.4 % — SIGNIFICANT CHANGE UP (ref 34.5–45)
HGB BLD-MCNC: 13.3 G/DL — SIGNIFICANT CHANGE UP (ref 11.5–15.5)
IMM GRANULOCYTES NFR BLD AUTO: 0.4 % — SIGNIFICANT CHANGE UP (ref 0–0.9)
LYMPHOCYTES # BLD AUTO: 1.94 K/UL — SIGNIFICANT CHANGE UP (ref 1–3.3)
LYMPHOCYTES # BLD AUTO: 17 % — SIGNIFICANT CHANGE UP (ref 13–44)
MAGNESIUM SERPL-MCNC: 1.5 MG/DL — LOW (ref 1.6–2.6)
MAGNESIUM SERPL-MCNC: 2.5 MG/DL — SIGNIFICANT CHANGE UP (ref 1.6–2.6)
MCHC RBC-ENTMCNC: 31.1 PG — SIGNIFICANT CHANGE UP (ref 27–34)
MCHC RBC-ENTMCNC: 33.8 GM/DL — SIGNIFICANT CHANGE UP (ref 32–36)
MCV RBC AUTO: 92.1 FL — SIGNIFICANT CHANGE UP (ref 80–100)
MONOCYTES # BLD AUTO: 0.45 K/UL — SIGNIFICANT CHANGE UP (ref 0–0.9)
MONOCYTES NFR BLD AUTO: 4 % — SIGNIFICANT CHANGE UP (ref 2–14)
NEUTROPHILS # BLD AUTO: 8.87 K/UL — HIGH (ref 1.8–7.4)
NEUTROPHILS NFR BLD AUTO: 77.9 % — HIGH (ref 43–77)
PHOSPHATE SERPL-MCNC: 1.8 MG/DL — LOW (ref 2.5–4.5)
PLATELET # BLD AUTO: 372 K/UL — SIGNIFICANT CHANGE UP (ref 150–400)
POTASSIUM SERPL-MCNC: 3.2 MMOL/L — LOW (ref 3.5–5.3)
POTASSIUM SERPL-MCNC: 4.2 MMOL/L — SIGNIFICANT CHANGE UP (ref 3.5–5.3)
POTASSIUM SERPL-SCNC: 3.2 MMOL/L — LOW (ref 3.5–5.3)
POTASSIUM SERPL-SCNC: 4.2 MMOL/L — SIGNIFICANT CHANGE UP (ref 3.5–5.3)
PROT SERPL-MCNC: 7.5 GM/DL — SIGNIFICANT CHANGE UP (ref 6–8.3)
PROT SERPL-MCNC: 8.2 GM/DL — SIGNIFICANT CHANGE UP (ref 6–8.3)
RBC # BLD: 4.28 M/UL — SIGNIFICANT CHANGE UP (ref 3.8–5.2)
RBC # FLD: 13.6 % — SIGNIFICANT CHANGE UP (ref 10.3–14.5)
SARS-COV-2 RNA SPEC QL NAA+PROBE: SIGNIFICANT CHANGE UP
SODIUM SERPL-SCNC: 136 MMOL/L — SIGNIFICANT CHANGE UP (ref 135–145)
SODIUM SERPL-SCNC: 138 MMOL/L — SIGNIFICANT CHANGE UP (ref 135–145)
TROPONIN I, HIGH SENSITIVITY RESULT: <3 NG/L — SIGNIFICANT CHANGE UP
WBC # BLD: 11.39 K/UL — HIGH (ref 3.8–10.5)
WBC # FLD AUTO: 11.39 K/UL — HIGH (ref 3.8–10.5)

## 2023-06-02 PROCEDURE — 99285 EMERGENCY DEPT VISIT HI MDM: CPT

## 2023-06-02 PROCEDURE — 80048 BASIC METABOLIC PNL TOTAL CA: CPT

## 2023-06-02 PROCEDURE — 80076 HEPATIC FUNCTION PANEL: CPT

## 2023-06-02 PROCEDURE — 99223 1ST HOSP IP/OBS HIGH 75: CPT

## 2023-06-02 PROCEDURE — 71045 X-RAY EXAM CHEST 1 VIEW: CPT | Mod: 26

## 2023-06-02 PROCEDURE — 87635 SARS-COV-2 COVID-19 AMP PRB: CPT

## 2023-06-02 PROCEDURE — 93010 ELECTROCARDIOGRAM REPORT: CPT

## 2023-06-02 PROCEDURE — 36415 COLL VENOUS BLD VENIPUNCTURE: CPT

## 2023-06-02 PROCEDURE — 83735 ASSAY OF MAGNESIUM: CPT

## 2023-06-02 PROCEDURE — 84100 ASSAY OF PHOSPHORUS: CPT

## 2023-06-02 RX ORDER — NORETHINDRONE AND ETHINYL ESTRADIOL 0.4-0.035
1 KIT ORAL
Refills: 0 | DISCHARGE

## 2023-06-02 RX ORDER — MAGNESIUM SULFATE 500 MG/ML
2 VIAL (ML) INJECTION ONCE
Refills: 0 | Status: COMPLETED | OUTPATIENT
Start: 2023-06-02 | End: 2023-06-02

## 2023-06-02 RX ORDER — VALACYCLOVIR 500 MG/1
1 TABLET, FILM COATED ORAL
Refills: 0 | DISCHARGE

## 2023-06-02 RX ORDER — FOLIC ACID 0.8 MG
1 TABLET ORAL
Refills: 0 | DISCHARGE

## 2023-06-02 RX ORDER — ACETAMINOPHEN 500 MG
650 TABLET ORAL EVERY 6 HOURS
Refills: 0 | Status: DISCONTINUED | OUTPATIENT
Start: 2023-06-02 | End: 2023-06-05

## 2023-06-02 RX ORDER — ONDANSETRON 8 MG/1
4 TABLET, FILM COATED ORAL EVERY 8 HOURS
Refills: 0 | Status: DISCONTINUED | OUTPATIENT
Start: 2023-06-02 | End: 2023-06-03

## 2023-06-02 RX ORDER — SODIUM CHLORIDE 9 MG/ML
1000 INJECTION INTRAMUSCULAR; INTRAVENOUS; SUBCUTANEOUS ONCE
Refills: 0 | Status: COMPLETED | OUTPATIENT
Start: 2023-06-02 | End: 2023-06-02

## 2023-06-02 RX ORDER — DIAZEPAM 5 MG
1 TABLET ORAL
Refills: 0 | DISCHARGE

## 2023-06-02 RX ADMIN — SODIUM CHLORIDE 1000 MILLILITER(S): 9 INJECTION INTRAMUSCULAR; INTRAVENOUS; SUBCUTANEOUS at 11:36

## 2023-06-02 RX ADMIN — Medication 2 MILLIGRAM(S): at 22:57

## 2023-06-02 RX ADMIN — Medication 2 MILLIGRAM(S): at 16:33

## 2023-06-02 RX ADMIN — Medication 25 GRAM(S): at 16:33

## 2023-06-02 RX ADMIN — Medication 2 MILLIGRAM(S): at 11:36

## 2023-06-02 RX ADMIN — Medication 2 MILLIGRAM(S): at 19:53

## 2023-06-02 NOTE — PATIENT PROFILE ADULT - FUNCTIONAL ASSESSMENT - BASIC MOBILITY 6.
3-calculated by average/Not able to assess (calculate score using [facility] averaging method)

## 2023-06-02 NOTE — ED ADULT NURSE NOTE - NSFALLUNIVINTERV_ED_ALL_ED
Bed/Stretcher in lowest position, wheels locked, appropriate side rails in place/Call bell, personal items and telephone in reach/Instruct patient to call for assistance before getting out of bed/chair/stretcher/Non-slip footwear applied when patient is off stretcher/[place] to call system/Physically safe environment - no spills, clutter or unnecessary equipment/Purposeful proactive rounding/Room/bathroom lighting operational, light cord in reach

## 2023-06-02 NOTE — H&P ADULT - HISTORY OF PRESENT ILLNESS
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

## 2023-06-02 NOTE — ED ADULT NURSE REASSESSMENT NOTE - NS ED NURSE REASSESS COMMENT FT1
Pt received from [facility] 1900. Pt A&Ox4. Pt states she is starting to feel anxious and mild tremors noted, Medicated as per MAR. Safety and comfort measures maintained. Awaiting to go upstairs.

## 2023-06-02 NOTE — H&P ADULT - NSHPPHYSICALEXAM_GEN_ALL_CORE
Vital Signs Last 24 Hrs  T(C): 36.7 (02 Jun 2023 13:39), Max: 37.1 (02 Jun 2023 12:31)  T(F): 98 (02 Jun 2023 13:39), Max: 98.8 (02 Jun 2023 12:31)  HR: 111 (02 Jun 2023 13:39) (96 - 111)  BP: 132/95 (02 Jun 2023 13:39) (120/92 - 151/100)  BP(mean): 117 (02 Jun 2023 10:42) (117 - 117)  RR: 19 (02 Jun 2023 13:39) (15 - 20)  SpO2: 100% (02 Jun 2023 13:39) (100% - 100%)    Parameters below as of 02 Jun 2023 13:39  Patient On (Oxygen Delivery Method): room air        PHYSICAL EXAM:      Constitutional: NAD  Eyes: perrl, no conjunctival changes  ENMT: no exudates, moist oral muc, uvula midline  Neck: no JVD, no LAD  Back: no cva tenderness  Respiratory: CTA, no exp wheezes  Cardiovascular: S1S2 reg, no murmur gallop or rub  Gastrointestinal: abd soft, NT/ND + BS  Genitourinary: voiding  Extremities: FROM, no joint effusions, no edema, no clubbing , no cyanosis  Vascular: pedal pulses + bilateral, warm extremities  Neurological: non focal, mot str 5/5/ all extr + tremors  Skin: no rashes  Lymph Nodes: no LAD

## 2023-06-02 NOTE — ED PROVIDER NOTE - NS_ ATTENDINGSCRIBEDETAILS _ED_A_ED_FT
I, Blair Pierre MD,  performed the initial face to face bedside interview with this patient regarding history of present illness, review of symptoms and relevant past medical, social and family history.  I completed an independent physical examination.  I was the initial provider who evaluated this patient.   I personally saw the patient and performed a substantive portion of the visit including all aspects of the medical decision making.  The history, relevant review of systems, past medical and surgical history, medical decision making, and physical examination was documented by the scribe in my presence and I attest to the accuracy of the documentation.

## 2023-06-02 NOTE — ED PROVIDER NOTE - NS ED ROS FT
Constitutional: No fever or chills + alcohol withdrawal  Eyes: No visual changes  HEENT: No throat pain  CV: No chest pain  Resp: No SOB no cough  GI: No abd pain, + nausea + vomiting  : No dysuria  MSK: No musculoskeletal pain  Skin: No rash  Neuro: No headache

## 2023-06-02 NOTE — ED PROVIDER NOTE - CLINICAL SUMMARY MEDICAL DECISION MAKING FREE TEXT BOX
33-year-old female presents to the emergency department for alcohol withdrawal.  Will do [facility] protocol social work and  substance counselor labs symptom control reassess 33-year-old female presents to the emergency department for alcohol withdrawal.  Will do CIWA protocol social work and  substance counselor labs symptom control reassess    pt and family seen by [facility] - they decline transfer at this time. Will admit to medical service. case discussed with hospitalist accepts. Blair Pierre M.D., Attending Physician

## 2023-06-02 NOTE — ED ADULT NURSE REASSESSMENT NOTE - NS ED NURSE REASSESS COMMENT FT1
Pt spoke to [facility] on the phone ,looking for a placement. Mother at the bedside. Pt spoke to [facility] on the phone ,looking for a placement. Mother at the bedside.

## 2023-06-02 NOTE — H&P ADULT - NSHPLABSRESULTS_GEN_ALL_CORE
13.3   11.39 )-----------( 372      ( 02 Jun 2023 10:40 )             39.4   06-02    138  |  104  |  12  ----------------------------<  107<H>  4.2   |  24  |  0.74    Ca    9.2      02 Jun 2023 10:40  Mg     1.5     06-02    TPro  8.2  /  Alb  3.9  /  TBili  0.4  /  DBili  x   /  AST  26  /  ALT  19  /  AlkPhos  58  06-02

## 2023-06-02 NOTE — ED PROVIDER NOTE - PHYSICAL EXAMINATION
Constitutional: mild distress AAOx3  Eyes: PERRLA EOMI  Head: Normocephalic atraumatic  Mouth: MMM  Cardiac: tachycardic  Resp: unlabored breathing clear b/l   GI: Abd s/nt/nd  Neuro: grossly normal and intact  Skin: No visible rashes

## 2023-06-02 NOTE — PHARMACOTHERAPY INTERVENTION NOTE - COMMENTS
Medication reconciliation completed.  Reviewed Medication list and confirmed med allergies with patient; confirmed with Dr. First Medkyler.

## 2023-06-02 NOTE — ED ADULT TRIAGE NOTE - CHIEF COMPLAINT QUOTE
pt c/o etoh withdrawal, last drink was last night, drinks about 1 pint daily.  pt was admitted to hospital for etoh withdrawal symptoms.  pt c/o ha, nausea, tremors.
Yes

## 2023-06-02 NOTE — PATIENT PROFILE ADULT - FALL HARM RISK - HARM RISK INTERVENTIONS
Assistance with ambulation/Assistance OOB with selected safe patient handling equipment/Communicate Risk of Fall with Harm to all staff/Monitor for mental status changes/Monitor gait and stability/Reinforce activity limits and safety measures with patient and family/Tailored Fall Risk Interventions/Toileting schedule using arm’s reach rule for commode and bathroom/Use of alarms - bed, chair and/or voice tab/Visual Cue: Yellow wristband and red socks/Bed in lowest position, wheels locked, appropriate side rails in place/Call bell, personal items and telephone in reach/Instruct patient to call for assistance before getting out of bed or chair/Non-slip footwear when patient is out of bed/[place] to call system/Physically safe environment - no spills, clutter or unnecessary equipment/Purposeful Proactive Rounding/Room/bathroom lighting operational, light cord in reach

## 2023-06-02 NOTE — ED PROVIDER NOTE - IV ALTEPLASE DOOR HIDDEN
Called patient to let her know that we needed to reschedule. Dr. Dyer will not be in th office on Thursday. I think patient thought I was a  because they hung up phone. I called back multiple times but no answer and mailbox is full.   show

## 2023-06-02 NOTE — ED ADULT NURSE NOTE - CHIEF COMPLAINT QUOTE
pt c/o etoh withdrawal, last drink was last night, drinks about 1 pint daily.  pt was admitted to hospital for etoh withdrawal symptoms.  pt c/o ha, nausea, tremors.

## 2023-06-02 NOTE — ED PROVIDER NOTE - OBJECTIVE STATEMENT
33-year-old female history of epilepsy previous alcohol withdrawal presents to the ED for alcohol withdrawal.  Patient states that she has been drinking about a quarter of a pint of vodka daily last drink was yesterday at 5 PM states that she woke up this morning feeling shaky nauseous and like she was going through withdrawals.  States that she does not want to drink anymore and so came to the emergency department for further help.  States she has never had a seizure from alcohol withdrawal but did have an admission for withdrawal recently.

## 2023-06-03 LAB
ANION GAP SERPL CALC-SCNC: 6 MMOL/L — SIGNIFICANT CHANGE UP (ref 5–17)
BUN SERPL-MCNC: 5 MG/DL — LOW (ref 7–23)
CALCIUM SERPL-MCNC: 8.9 MG/DL — SIGNIFICANT CHANGE UP (ref 8.5–10.1)
CHLORIDE SERPL-SCNC: 108 MMOL/L — SIGNIFICANT CHANGE UP (ref 96–108)
CO2 SERPL-SCNC: 25 MMOL/L — SIGNIFICANT CHANGE UP (ref 22–31)
CREAT SERPL-MCNC: 0.62 MG/DL — SIGNIFICANT CHANGE UP (ref 0.5–1.3)
EGFR: 120 ML/MIN/1.73M2 — SIGNIFICANT CHANGE UP
GLUCOSE SERPL-MCNC: 97 MG/DL — SIGNIFICANT CHANGE UP (ref 70–99)
MAGNESIUM SERPL-MCNC: 2.4 MG/DL — SIGNIFICANT CHANGE UP (ref 1.6–2.6)
PHOSPHATE SERPL-MCNC: 2.2 MG/DL — LOW (ref 2.5–4.5)
POTASSIUM SERPL-MCNC: 3.7 MMOL/L — SIGNIFICANT CHANGE UP (ref 3.5–5.3)
POTASSIUM SERPL-SCNC: 3.7 MMOL/L — SIGNIFICANT CHANGE UP (ref 3.5–5.3)
SODIUM SERPL-SCNC: 139 MMOL/L — SIGNIFICANT CHANGE UP (ref 135–145)

## 2023-06-03 PROCEDURE — 99233 SBSQ HOSP IP/OBS HIGH 50: CPT

## 2023-06-03 RX ORDER — SODIUM,POTASSIUM PHOSPHATES 278-250MG
1 POWDER IN PACKET (EA) ORAL
Refills: 0 | Status: COMPLETED | OUTPATIENT
Start: 2023-06-03 | End: 2023-06-03

## 2023-06-03 RX ORDER — ONDANSETRON 8 MG/1
4 TABLET, FILM COATED ORAL EVERY 8 HOURS
Refills: 0 | Status: DISCONTINUED | OUTPATIENT
Start: 2023-06-03 | End: 2023-06-05

## 2023-06-03 RX ORDER — SERTRALINE 25 MG/1
50 TABLET, FILM COATED ORAL DAILY
Refills: 0 | Status: DISCONTINUED | OUTPATIENT
Start: 2023-06-03 | End: 2023-06-05

## 2023-06-03 RX ORDER — POTASSIUM CHLORIDE 20 MEQ
40 PACKET (EA) ORAL
Refills: 0 | Status: COMPLETED | OUTPATIENT
Start: 2023-06-03 | End: 2023-06-03

## 2023-06-03 RX ORDER — FOLIC ACID 0.8 MG
1 TABLET ORAL DAILY
Refills: 0 | Status: DISCONTINUED | OUTPATIENT
Start: 2023-06-03 | End: 2023-06-05

## 2023-06-03 RX ADMIN — Medication 1.5 MILLIGRAM(S): at 18:35

## 2023-06-03 RX ADMIN — Medication 1 PACKET(S): at 06:34

## 2023-06-03 RX ADMIN — Medication 40 MILLIEQUIVALENT(S): at 06:35

## 2023-06-03 RX ADMIN — Medication 2 MILLIGRAM(S): at 06:47

## 2023-06-03 RX ADMIN — Medication 1.5 MILLIGRAM(S): at 14:19

## 2023-06-03 RX ADMIN — SERTRALINE 50 MILLIGRAM(S): 25 TABLET, FILM COATED ORAL at 14:19

## 2023-06-03 RX ADMIN — Medication 40 MILLIEQUIVALENT(S): at 21:52

## 2023-06-03 RX ADMIN — Medication 1.5 MILLIGRAM(S): at 21:52

## 2023-06-03 RX ADMIN — Medication 2 MILLIGRAM(S): at 10:44

## 2023-06-03 RX ADMIN — Medication 1 MILLIGRAM(S): at 14:19

## 2023-06-03 RX ADMIN — Medication 2 MILLIGRAM(S): at 03:08

## 2023-06-03 RX ADMIN — Medication 1 PACKET(S): at 21:52

## 2023-06-03 NOTE — PROGRESS NOTE ADULT - SUBJECTIVE AND OBJECTIVE BOX
CHIEF COMPLAINT/INTERVAL HISTORY:    Patient is a 33y old  Female who presents with a chief complaint of     HPI:   33 year old female with PMHx of ETOH abuse and anxiety secondary to trauma presents to the ED with mother requesting detox.  (02 Jun 2023 16:23)      ICU Vital Signs Last 24 Hrs  T(C): 37 (03 Jun 2023 07:28), Max: 37.1 (02 Jun 2023 12:31)  T(F): 98.6 (03 Jun 2023 07:28), Max: 98.8 (02 Jun 2023 12:31)  HR: 82 (03 Jun 2023 07:28) (82 - 126)  BP: 111/80 (03 Jun 2023 07:28) (111/80 - 151/100)  BP(mean): 109 (02 Jun 2023 16:34) (109 - 117)  ABP: --  ABP(mean): --  RR: 18 (03 Jun 2023 07:28) (15 - 20)  SpO2: 99% (03 Jun 2023 07:28) (97% - 100%)    O2 Parameters below as of 03 Jun 2023 07:28  Patient On (Oxygen Delivery Method): room air              MEDICATIONS  (STANDING):  LORazepam   Injectable 2 milliGRAM(s) IV Push every 4 hours  LORazepam   Injectable 1.5 milliGRAM(s) IV Push every 4 hours  LORazepam   Injectable   IV Push   potassium chloride   Powder 40 milliEquivalent(s) Oral two times a day  potassium phosphate / sodium phosphate Powder (PHOS-NaK) 1 Packet(s) Oral two times a day    MEDICATIONS  (PRN):  acetaminophen     Tablet .. 650 milliGRAM(s) Oral every 6 hours PRN Temp greater or equal to 38C (100.4F)  LORazepam   Injectable 1 milliGRAM(s) IV Push once PRN Agitation  ondansetron    Tablet 4 milliGRAM(s) Oral every 8 hours PRN Nausea and/or Vomiting        PHYSICAL EXAM:    GENERAL: NAD, well-groomed, well-developed  NERVOUS SYSTEM:  Alert & Oriented X3, Motor Strength 5/5 B/L upper and lower extremities; DTRs 2+ intact and symmetric  CHEST/LUNG: Clear to auscultation bilaterally; No rales, rhonchi, wheezing, or rubs  HEART: Regular rate and rhythm; No murmurs, rubs, or gallops  ABDOMEN: Soft, Nontender, Nondistended; Bowel sounds present  EXTREMITIES:  2+ Peripheral Pulses, No clubbing, cyanosis, or edema    LABS:                        13.3   11.39 )-----------( 372      ( 02 Jun 2023 10:40 )             39.4     06-03    139  |  108  |  5<L>  ----------------------------<  97  3.7   |  25  |  0.62    Ca    8.9      03 Jun 2023 06:29  Phos  2.2     06-03  Mg     2.4     06-03    TPro  7.5  /  Alb  3.6  /  TBili  0.8  /  DBili  0.2  /  AST  14<L>  /  ALT  17  /  AlkPhos  55  06-02          Health Issues:  Alcohol use with withdrawal

## 2023-06-04 ENCOUNTER — TRANSCRIPTION ENCOUNTER (OUTPATIENT)
Age: 33
End: 2023-06-04

## 2023-06-04 PROCEDURE — 99232 SBSQ HOSP IP/OBS MODERATE 35: CPT

## 2023-06-04 RX ADMIN — Medication 1 MILLIGRAM(S): at 22:35

## 2023-06-04 RX ADMIN — Medication 1 MILLIGRAM(S): at 18:40

## 2023-06-04 RX ADMIN — SERTRALINE 50 MILLIGRAM(S): 25 TABLET, FILM COATED ORAL at 10:01

## 2023-06-04 RX ADMIN — ONDANSETRON 4 MILLIGRAM(S): 8 TABLET, FILM COATED ORAL at 21:28

## 2023-06-04 RX ADMIN — Medication 1 MILLIGRAM(S): at 14:18

## 2023-06-04 RX ADMIN — Medication 1 MILLIGRAM(S): at 09:56

## 2023-06-04 RX ADMIN — Medication 1.5 MILLIGRAM(S): at 09:57

## 2023-06-04 RX ADMIN — ONDANSETRON 4 MILLIGRAM(S): 8 TABLET, FILM COATED ORAL at 14:19

## 2023-06-04 RX ADMIN — Medication 1.5 MILLIGRAM(S): at 06:07

## 2023-06-04 RX ADMIN — Medication 1.5 MILLIGRAM(S): at 02:05

## 2023-06-04 NOTE — DISCHARGE NOTE PROVIDER - NSDCCPCAREPLAN_GEN_ALL_CORE_FT
PRINCIPAL DISCHARGE DIAGNOSIS  Diagnosis: Alcohol withdrawal  Assessment and Plan of Treatment: f/up as outpatient for treatment

## 2023-06-04 NOTE — DISCHARGE NOTE PROVIDER - HOSPITAL COURSE
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     [facility] protocol tolerated 33 year old female with PMHx of ETOH abuse and anxiety secondary to trauma presents to the ED with mother requesting detox.     [facility] protocol tolerated  OP f/up for Naltrexone as per CSW 33 year old female with PMHx of ETOH abuse and anxiety secondary to trauma presents to the ED with mother requesting detox.     [facility] protocol tolerated  OP f/up for Naltrexone as per CSW all set up

## 2023-06-04 NOTE — DISCHARGE NOTE PROVIDER - NSDCMRMEDTOKEN_GEN_ALL_CORE_FT
Ativan 1 mg oral tablet: 1 tab(s) orally once a day as needed for  anxiety MDD: 1  folic acid 1 mg oral tablet: 1 tab(s) orally once a day  Junel 1/20 oral tablet: 1 tab(s) orally once a day ***patients mother will bring in***  ondansetron 4 mg oral tablet, disintegratin tab(s) orally 3 times a day as needed for  nausea  valACYclovir 1 g oral tablet: 1 orally prn as needed for outbreak  Zoloft 50 mg oral tablet: 1 tab(s) orally once a day   Ativan 1 mg oral tablet: 1 tab(s) orally 3 times a day as needed for  anxiety MDD: 3  folic acid 1 mg oral tablet: 1 tab(s) orally once a day  Junel 1/20 oral tablet: 1 tab(s) orally once a day ***patients mother will bring in***  ondansetron 4 mg oral tablet, disintegratin tab(s) orally 3 times a day as needed for  nausea  valACYclovir 1 g oral tablet: 1 orally prn as needed for outbreak  Zoloft 50 mg oral tablet: 1 tab(s) orally once a day

## 2023-06-04 NOTE — DISCHARGE NOTE PROVIDER - CARE PROVIDER_API CALL
Dr Mohamud,   [address] [place] [address]  Phone: (   )    -  Fax: (   )    -  Follow Up Time:

## 2023-06-04 NOTE — DISCHARGE NOTE PROVIDER - PROVIDER TOKENS
FREE:[LAST:[Dr Mohamud],PHONE:[(   )    -],FAX:[(   )    -],ADDRESS:[[address] [place] [address]]]

## 2023-06-05 ENCOUNTER — TRANSCRIPTION ENCOUNTER (OUTPATIENT)
Age: 33
End: 2023-06-05

## 2023-06-05 VITALS
HEART RATE: 83 BPM | RESPIRATION RATE: 18 BRPM | OXYGEN SATURATION: 99 % | SYSTOLIC BLOOD PRESSURE: 120 MMHG | DIASTOLIC BLOOD PRESSURE: 80 MMHG | TEMPERATURE: 98 F

## 2023-06-05 PROCEDURE — 99239 HOSP IP/OBS DSCHRG MGMT >30: CPT

## 2023-06-05 RX ADMIN — Medication 1 MILLIGRAM(S): at 09:56

## 2023-06-05 RX ADMIN — Medication 1 MILLIGRAM(S): at 06:01

## 2023-06-05 RX ADMIN — Medication 1 MILLIGRAM(S): at 02:19

## 2023-06-05 RX ADMIN — SERTRALINE 50 MILLIGRAM(S): 25 TABLET, FILM COATED ORAL at 09:56

## 2023-06-05 NOTE — DISCHARGE NOTE NURSING/CASE MANAGEMENT/SOCIAL WORK - PATIENT PORTAL LINK FT
You can access the FollowMyHealth Patient Portal offered by [facility] by registering at the following website: [email or web address]. By joining [facility]’s FollowMyHealth portal, you will also be able to view your health information using other applications (apps) compatible with our system.

## 2023-06-05 NOTE — DISCHARGE NOTE NURSING/CASE MANAGEMENT/SOCIAL WORK - NSPROEXTENSIONSOFSELF_GEN_A_NUR
none Home Suture Removal Text: Patient was provided instructions on removing sutures and will remove their sutures at home.  If they have any questions or difficulties they will call the office.

## 2023-06-06 ENCOUNTER — APPOINTMENT (OUTPATIENT)
Dept: INTERNAL MEDICINE | Facility: CLINIC | Age: 33
End: 2023-06-06
Payer: MEDICAID

## 2023-06-06 DIAGNOSIS — F10.20 ALCOHOL DEPENDENCE, UNCOMPLICATED: ICD-10-CM

## 2023-06-06 PROCEDURE — 99214 OFFICE O/P EST MOD 30 MIN: CPT | Mod: 95

## 2023-06-06 RX ORDER — SERTRALINE HYDROCHLORIDE 50 MG/1
50 TABLET, FILM COATED ORAL
Refills: 0 | Status: ACTIVE | COMMUNITY

## 2023-06-06 RX ORDER — NALTREXONE HYDROCHLORIDE 50 MG/1
50 TABLET, FILM COATED ORAL DAILY
Qty: 30 | Refills: 0 | Status: ACTIVE | COMMUNITY
Start: 2023-06-06 | End: 1900-01-01

## 2023-06-06 NOTE — HISTORY OF PRESENT ILLNESS
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  Pt reports increased alcohol use since 2020 after early severe COVID infection which developed into near daily drinking, with repeated evidence of impaired control, social impairment, high risk recurrent use, and physiologic dependence.  Denies past DTs, alcohol seizures, alcohol hallucinosis withdrawal.  Has experiences tremors and diaphoresis during brief abstinence periods which would resolve by drinking.  Pt completed detox at [facility] in April 2023 after period of chronic high intensity daily drinking, then abstained from alcohol use until late May 2023 at which time she relapsed to heavy binge drinking for approx 1 week.  Returned to [facility] and was admitted for 2 days, mild KORI treated with ativan, d/c'ed 6/5/23.  Pt reports abstinence from alcohol since most recent hospital d/c.  Interested in starting naltrexone, unsure if would take daily or if alcohol cravings return.  Also interested in general psychiatric assessment and ongoing therapy.          [TextBox_7] : As above [TextBox_9] : 1 detox at [facility], denies other treatment [TextBox_11] : Meets 11 of 11 AUD criteria [TextBox_52] : Reports past use of benzodiazepines for Anxiety/panic.  Benzo use d/c'ed during first alcohol withdrawal in April 2023.  No use other than related to alcohol detox since April 2023.  Denies sedative use criteria, denies past benzodiazepine misuse. [TextBox_54] : Julianna  [TextBox_56] : Julianna

## 2023-06-06 NOTE — CURRENT PSYCHIATRIC SYMPTOMS
[Depressed Mood] : no depressed mood [Anhedonia] : no anhedonia [Insomnia] : no insomnia disorder [Euphoria] : no euphoria [Delusions] : no ~T delusions [Excessive Worry] : no excessive worries [Recent Onset] : no recent onset of confusion [Tremor] : ~T no ~M tremor was seen [Hallucination Visual] : no visual hallucinations [Anxiety] : no anxiety [Hallucination Olfactory] : no olfactory hallucinations [Nausea] : no nausea [Hallucination Tactile] : no tactile hallucinations [Hallucination Gustatory] : no gustatory hallucinations [Diaphoresis] : showed no ~M diaphoresis [Vomiting] : no vomiting

## 2023-06-06 NOTE — PHYSICAL EXAM
[No Acute Distress] : no acute distress [Well Nourished] : well nourished [Well Developed] : well developed [Well-Appearing] : well-appearing [Normal Voice/Communication] : normal voice/communication [Coordination Grossly Intact] : coordination grossly intact [No Focal Deficits] : no focal deficits [Speech Grossly Normal] : speech grossly normal [Memory Grossly Normal] : memory grossly normal [Normal Affect] : the affect was normal [Alert and Oriented x3] : oriented to person, place, and time [Normal Mood] : the mood was normal [Normal Insight/Judgement] : insight and judgment were intact [Normal] : affect was normal and insight and judgment were intact

## 2023-06-06 NOTE — PAST MEDICAL HISTORY
[FreeTextEntry1] : Reports h/o severe Anxiety with Panic since teenager.  Previously on benzodiazepines.  Denies past sedative use disorder.  Denies past misuse of benzodiazepines.  No reported h/o SI/HI/SA/HA.

## 2023-06-06 NOTE — DISCUSSION/SUMMARY
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  Pt meets alcohol use disorder criteria 11/11.  Pt reports abstinence from alcohol since most recent hospital d/c with motivation to remain abstinent.  Denies current KORI.  Pt interested in starting naltrexone, unsure if she would take daily or only as needed if alcohol cravings return.  Also interested in general psychiatric assessment to assess past diagnosis of anxiety with panic and ongoing therapy.  \par Alcohol Use Disorder, severe, s/p inpatient KORI treatment in very early abstinence\par -Extensive psychoeducation provided on substance use disorders, alcohol use disorder, available levels of care, benefits/risks/alternatives/formulations to all FDA approved AUD pharmacotherapies, behavioral interventions, and digital therapeutics.\par -Pt interested in naltrexone oral therapy, ambivalent on daily or as needed use.  Reviewed in detail benefits, risks, alternatives of naltrexone use including but not limited to liver disease, current elevation in LFTs, ongoing monitoring and labs, continued contraception as not indicated during pregnancy, neurologic/mood changes, dyspepsia, allergic reaction, opioid antagonism.\par -Benefits outweigh risks for patient with AUD severe in early abstinence after recent relapse of initiation of naltrexone 50mg daily, prescription naltrexone 50mg daily #30, no refills sent to pharmacy.\par -Pt accepted referral to continue care at [facility] for comprehensive AUD MAT, behavioral interventions, and psychiatric integrated care.  Consent provided for coordination with [facility] outreach to patient.   \par -OD prevention eduction briefly reviewed, pt not interested at this time.\par -F/u as needed.\par \par 40min for case review, clinical assessment, coordination of referral with clinical discussion, and documentation.

## 2023-06-06 NOTE — REASON FOR VISIT
[Patient preference] : as per patient preference [Telehealth (audio & video) - Individual] : This visit was provided via telehealth using real-time 2-way audio visual technology. [Medical Office: ([facility])___] : The provider was located at the medical office in [unfilled]. [Home] : The patient, [unfilled], was located at home, [unfilled], at the time of the visit. [Verbal consent obtained from patient/other participant(s)] : Verbal consent for telehealth/telephonic services obtained from patient/other participant(s) [Evaluation] : evaluation of [FreeTextEntry4] : 11:00 [FreeTextEntry5] : 11:45 [FreeTextEntry1] : MAT for Alcohol Use Disorder [FreeTextEntry2] : [facility]

## 2023-06-09 DIAGNOSIS — K21.9 GASTRO-ESOPHAGEAL REFLUX DISEASE WITHOUT ESOPHAGITIS: ICD-10-CM

## 2023-06-09 DIAGNOSIS — F10.239 ALCOHOL DEPENDENCE WITH WITHDRAWAL, UNSPECIFIED: ICD-10-CM

## 2023-06-09 DIAGNOSIS — Z88.2 ALLERGY STATUS TO SULFONAMIDES: ICD-10-CM

## 2023-06-09 DIAGNOSIS — Z88.1 ALLERGY STATUS TO OTHER ANTIBIOTIC AGENTS STATUS: ICD-10-CM

## 2023-06-09 DIAGNOSIS — Z88.6 ALLERGY STATUS TO ANALGESIC AGENT: ICD-10-CM

## 2023-06-09 DIAGNOSIS — F41.9 ANXIETY DISORDER, UNSPECIFIED: ICD-10-CM

## 2023-06-09 DIAGNOSIS — G47.30 SLEEP APNEA, UNSPECIFIED: ICD-10-CM

## 2023-06-09 DIAGNOSIS — N80.9 ENDOMETRIOSIS, UNSPECIFIED: ICD-10-CM

## 2023-06-09 DIAGNOSIS — E83.42 HYPOMAGNESEMIA: ICD-10-CM

## 2023-07-01 ENCOUNTER — EMERGENCY (EMERGENCY)
Facility: HOSPITAL | Age: 33
LOS: 0 days | Discharge: HOME | End: 2023-07-01
Attending: EMERGENCY MEDICINE
Payer: MEDICAID

## 2023-07-01 VITALS
OXYGEN SATURATION: 99 % | WEIGHT: 138.89 LBS | SYSTOLIC BLOOD PRESSURE: 133 MMHG | DIASTOLIC BLOOD PRESSURE: 92 MMHG | RESPIRATION RATE: 18 BRPM | HEIGHT: 61 IN | HEART RATE: 87 BPM | TEMPERATURE: 99 F

## 2023-07-01 VITALS
DIASTOLIC BLOOD PRESSURE: 71 MMHG | HEART RATE: 87 BPM | SYSTOLIC BLOOD PRESSURE: 110 MMHG | TEMPERATURE: 98 F | RESPIRATION RATE: 18 BRPM | OXYGEN SATURATION: 99 %

## 2023-07-01 DIAGNOSIS — R79.89 OTHER SPECIFIED ABNORMAL FINDINGS OF BLOOD CHEMISTRY: ICD-10-CM

## 2023-07-01 DIAGNOSIS — F10.220 ALCOHOL DEPENDENCE WITH INTOXICATION, UNCOMPLICATED: ICD-10-CM

## 2023-07-01 DIAGNOSIS — R41.82 ALTERED MENTAL STATUS, UNSPECIFIED: ICD-10-CM

## 2023-07-01 DIAGNOSIS — Z88.2 ALLERGY STATUS TO SULFONAMIDES: ICD-10-CM

## 2023-07-01 DIAGNOSIS — Z88.8 ALLERGY STATUS TO OTHER DRUGS, MEDICAMENTS AND BIOLOGICAL SUBSTANCES: ICD-10-CM

## 2023-07-01 DIAGNOSIS — F41.9 ANXIETY DISORDER, UNSPECIFIED: ICD-10-CM

## 2023-07-01 DIAGNOSIS — K21.9 GASTRO-ESOPHAGEAL REFLUX DISEASE WITHOUT ESOPHAGITIS: ICD-10-CM

## 2023-07-01 DIAGNOSIS — Z88.1 ALLERGY STATUS TO OTHER ANTIBIOTIC AGENTS STATUS: ICD-10-CM

## 2023-07-01 DIAGNOSIS — Z20.822 CONTACT WITH AND (SUSPECTED) EXPOSURE TO COVID-19: ICD-10-CM

## 2023-07-01 LAB
ALBUMIN SERPL ELPH-MCNC: 3.8 G/DL — SIGNIFICANT CHANGE UP (ref 3.3–5)
ALP SERPL-CCNC: 57 U/L — SIGNIFICANT CHANGE UP (ref 40–120)
ALT FLD-CCNC: 21 U/L — SIGNIFICANT CHANGE UP (ref 12–78)
ANION GAP SERPL CALC-SCNC: 10 MMOL/L — SIGNIFICANT CHANGE UP (ref 5–17)
APAP SERPL-MCNC: < 2 UG/ML (ref 10–30)
AST SERPL-CCNC: 22 U/L — SIGNIFICANT CHANGE UP (ref 15–37)
BASOPHILS # BLD AUTO: 0.03 K/UL — SIGNIFICANT CHANGE UP (ref 0–0.2)
BASOPHILS NFR BLD AUTO: 0.5 % — SIGNIFICANT CHANGE UP (ref 0–2)
BILIRUB SERPL-MCNC: 0.1 MG/DL — LOW (ref 0.2–1.2)
BUN SERPL-MCNC: 7 MG/DL — SIGNIFICANT CHANGE UP (ref 7–23)
CALCIUM SERPL-MCNC: 9.1 MG/DL — SIGNIFICANT CHANGE UP (ref 8.5–10.1)
CHLORIDE SERPL-SCNC: 111 MMOL/L — HIGH (ref 96–108)
CO2 SERPL-SCNC: 25 MMOL/L — SIGNIFICANT CHANGE UP (ref 22–31)
CREAT SERPL-MCNC: 0.75 MG/DL — SIGNIFICANT CHANGE UP (ref 0.5–1.3)
EGFR: 107 ML/MIN/1.73M2 — SIGNIFICANT CHANGE UP
EOSINOPHIL # BLD AUTO: 0.05 K/UL — SIGNIFICANT CHANGE UP (ref 0–0.5)
EOSINOPHIL NFR BLD AUTO: 0.8 % — SIGNIFICANT CHANGE UP (ref 0–6)
ETHANOL SERPL-MCNC: 334 MG/DL — HIGH (ref 0–10)
FLUAV AG NPH QL: SIGNIFICANT CHANGE UP
FLUBV AG NPH QL: SIGNIFICANT CHANGE UP
GLUCOSE SERPL-MCNC: 82 MG/DL — SIGNIFICANT CHANGE UP (ref 70–99)
HCG SERPL-ACNC: <1 MIU/ML — SIGNIFICANT CHANGE UP
HCT VFR BLD CALC: 39.4 % — SIGNIFICANT CHANGE UP (ref 34.5–45)
HGB BLD-MCNC: 13 G/DL — SIGNIFICANT CHANGE UP (ref 11.5–15.5)
IMM GRANULOCYTES NFR BLD AUTO: 0.2 % — SIGNIFICANT CHANGE UP (ref 0–0.9)
LYMPHOCYTES # BLD AUTO: 2.83 K/UL — SIGNIFICANT CHANGE UP (ref 1–3.3)
LYMPHOCYTES # BLD AUTO: 45.1 % — HIGH (ref 13–44)
MCHC RBC-ENTMCNC: 31.4 PG — SIGNIFICANT CHANGE UP (ref 27–34)
MCHC RBC-ENTMCNC: 33 GM/DL — SIGNIFICANT CHANGE UP (ref 32–36)
MCV RBC AUTO: 95.2 FL — SIGNIFICANT CHANGE UP (ref 80–100)
MONOCYTES # BLD AUTO: 0.29 K/UL — SIGNIFICANT CHANGE UP (ref 0–0.9)
MONOCYTES NFR BLD AUTO: 4.6 % — SIGNIFICANT CHANGE UP (ref 2–14)
NEUTROPHILS # BLD AUTO: 3.06 K/UL — SIGNIFICANT CHANGE UP (ref 1.8–7.4)
NEUTROPHILS NFR BLD AUTO: 48.8 % — SIGNIFICANT CHANGE UP (ref 43–77)
PLATELET # BLD AUTO: 362 K/UL — SIGNIFICANT CHANGE UP (ref 150–400)
POTASSIUM SERPL-MCNC: 3.8 MMOL/L — SIGNIFICANT CHANGE UP (ref 3.5–5.3)
POTASSIUM SERPL-SCNC: 3.8 MMOL/L — SIGNIFICANT CHANGE UP (ref 3.5–5.3)
PROT SERPL-MCNC: 8.2 GM/DL — SIGNIFICANT CHANGE UP (ref 6–8.3)
RBC # BLD: 4.14 M/UL — SIGNIFICANT CHANGE UP (ref 3.8–5.2)
RBC # FLD: 14.2 % — SIGNIFICANT CHANGE UP (ref 10.3–14.5)
RSV RNA NPH QL NAA+NON-PROBE: SIGNIFICANT CHANGE UP
SALICYLATES SERPL-MCNC: 2 MG/DL — LOW (ref 2.8–20)
SARS-COV-2 RNA SPEC QL NAA+PROBE: SIGNIFICANT CHANGE UP
SODIUM SERPL-SCNC: 146 MMOL/L — HIGH (ref 135–145)
WBC # BLD: 6.27 K/UL — SIGNIFICANT CHANGE UP (ref 3.8–10.5)
WBC # FLD AUTO: 6.27 K/UL — SIGNIFICANT CHANGE UP (ref 3.8–10.5)

## 2023-07-01 PROCEDURE — 36415 COLL VENOUS BLD VENIPUNCTURE: CPT

## 2023-07-01 PROCEDURE — 99285 EMERGENCY DEPT VISIT HI MDM: CPT

## 2023-07-01 PROCEDURE — 80307 DRUG TEST PRSMV CHEM ANLYZR: CPT

## 2023-07-01 PROCEDURE — 85025 COMPLETE CBC W/AUTO DIFF WBC: CPT

## 2023-07-01 PROCEDURE — 0241U: CPT

## 2023-07-01 PROCEDURE — 80053 COMPREHEN METABOLIC PANEL: CPT

## 2023-07-01 PROCEDURE — 84702 CHORIONIC GONADOTROPIN TEST: CPT

## 2023-07-01 NOTE — ED ADULT NURSE NOTE - NSFALLRISKINTERV_ED_ALL_ED

## 2023-07-01 NOTE — ED PROVIDER NOTE - PROGRESS NOTE DETAILS
The patient was signed out to me pending reassessment when clinically sober.  Patient is awake alert oriented patient was seen by social work and substance counselor offered resources detox/rehab patient states she has resources outpatient does not want to be admitted.  Patient has no signs of alcohol withdrawal she has no SI or HI she is clinically sober and ambulating with steady gait she wants to leave the emergency department her vital signs are stable she is tolerating p.o. patient will have family pick her up will DC with follow-up and strict return precautions. Blair Pierre M.D., Attending Physician
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
## 2023-07-01 NOTE — [facility]IRT NOTE ADULT - NSSBIRTSCREENAVAIL_GEN_A_CORE
Patient declined screening. Positive reinforcement provided for the importance of adhering to NIH/NIAAA alcohol consumption guidelines. Pt reports she will speak to her AA sponsor upon returning home today. Patient declined resources offered./No

## 2023-07-01 NOTE — ED PROVIDER NOTE - PATIENT PORTAL LINK FT
You can access the FollowMyHealth Patient Portal offered by [facility] by registering at the following website: [email or web address]. By joining [facility]’s FollowMyHealth portal, you will also be able to view your health information using other applications (apps) compatible with our system.
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
## 2023-07-01 NOTE — ED PROVIDER NOTE - NSFOLLOWUPINSTRUCTIONS_ED_ALL_ED_FT
1. return for worsening symptoms or anything concerning to you  2. take all home meds as prescribed  3. follow up with your pmd call to make an appointment    Alcohol Use Disorder    WHAT YOU NEED TO KNOW:    Alcohol use disorder (AUD) is problem drinking. AUD includes alcohol abuse and alcohol dependency.     DISCHARGE INSTRUCTIONS:    Seek care immediately if:     Your heart is beating faster than usual.      You have hallucinations.      You cannot remember what happens while you are drinking.      You have seizures.    Contact your healthcare provider if:     You are anxious and have nausea.      Your hands are shaky and you are sweating heavily.      You have questions or concerns about your condition or care.    Follow up with your healthcare provider as directed: Do not try to stop drinking on your own. Your healthcare provider may need to help you withdraw from alcohol safely. He may need to admit you to the hospital. You may also need any of the following treatments:    Medicines to decrease your craving for alcohol      Support groups such as Alcoholics Anonymous       Therapy from a psychiatrist or psychologist       Admission to an inpatient facility for treatment for severe AUD    Interested in discussing options to reduce your alcohol or drug use?      [facility]: [phone]   [facility] Substance Abuse Services: [phone], option #2   Methadone Maintenance & Ambulatory Opiate Detox: [phone]  [facility]: [phone]  [facility]: [phone]  [facility]: [phone]    [facility]: [phone], option #2   [facility]: [phone]    [facility]: [phone]    [facility] Central Intake: [phone]  [facility] Chemical Dependency/Ancillary Withdrawal: [phone]  [facility] Methadone Maintenance: [phone]    [facility]: [phone]  [facility] Addiction Treatment Services: [phone]    [facility]: 5-742-6-HOPENY    [facility] Office of Alcoholism and Substance Abuse Services (OASAS): https://www.oasas.[place].gov/providerdirectory/  [facility] for Addiction Services and Psychotherapy Interventions Research (CASPIR)  www.caspirnyc.org     Interested in discussing options to reduce your tobacco use?    [facility] for Tobacco Control:  [phone]  [facility] QUITLINE: 7-955-BN-QUITS ([phone])    Interested in learning more about substance use?      http://rethinkingdrinking.niaaa.nih.gov   https://www.drugabuse.gov/patients-families     Learn more about opioid overdose prevention programs in [place]:  http://www.health.ny.gov/diseases/aids/general/opioid_overdose_prevention/

## 2023-07-01 NOTE — CHART NOTE - NSCHARTNOTESELECT_GEN_ALL_CORE
Event Note [facility] - Mother & Baby ([facility])  OB Initial Discharge Assessment    Swer completed discharge planning assessment with pt's mother at bedside. Pt's was easily engaged. Education on the role of  was provided. Emotional support provided throughout assessment.     Pt has five other children ages 11, 5, 4, and 13months (twins F/M). FOB name is Rambo Carrillo and is involved, but will not be signing birth certificate. Pt currently has a part time job. Baby has all essential items clothes, diapers, car seat, crib, etc. Swer inquired about prenatal care. Pt stated she couldn't make some appointments due to transportation issues and no . Pt was aware of positive UDS on her and baby. Pt stated she used THC to help with nausea and stated medication prescribed did not work. Pt used once a day every 2 months. Pt stated having a difficult time with nausea at the beginning of pregnancy. Pt reported last use was three/four days ago. First age of use was at the age of [age]. Swer explained mandating reporting.     Both pt and baby UDS + for THC.  A REPORT WAS MADE TO [facility] HOTLINE [phone]. [facility] WORKER ZHANNA TOOK REPORT. REPORT NUMBER IS [id number].  ONLINE REPORT FILE AS WELL.     SWER WILL REMAIN AVAILABLE THROUGHOUT PT'S ENTIRE STAY.     BABY'S NAME; KEVIN NGO   FOB'S NAME; RAMBO Carrillo  1989 ([phone])  PEDIATRICIAN; KENIA BARRIENTOS [facility]        Primary Care Provider: Primary Doctor No    Expected Discharge Date:     Initial Assessment (most recent)       OB Discharge Planning Assessment - 22 1145          OB Discharge Planning Assessment    Assessment Type Discharge Planning Assessment     Source of Information patient     Verified Demographic and Insurance Information Yes     Lives With parent(s)     Name(s) of Who Lives With Patient Radha Ngo (mother) [phone]     Number people in home 7     Relationship Status None     Name of Support/Comfort Primary  Source Radha Ngo (mother) [phone]     Other children (include names and ages) Bony F-11, Alina F-5, and Michael F-4, Carmen (F) and Ollie(4) -13months     Employed Part Time     Employer [employer]     Job Title phone banking     Currently Enrolled in School No     Highest Level of Education Some High School     Father's Involvement Fully Involved     Is Father signing the birth certificate No     Father's Address N/A     Father Currently Enrolled in School No     Father's Job Title      Family Involvement High     Primary Contact Name and Number Radha Ngo (mother) [phone]     Other Contacts Names and Numbers Steven Lazo (FOB)  6/6/1988 [phone]     Received Prenatal Care Yes   Limited    Transportation Anticipated car, drives self     Receive WIC Benefits Already certified, will apply for new born     Adoption Planned no     Infant Feeding Plan breastfeeding     Previous Breastfeeding Experience yes     Breast Pump Needed no     Does baby have crib or safe sleep space? Yes     Do you have a car seat? Yes     Pediatrician Angeline xie [facility]     Resource/Environmental Concerns none     Equipment Currently Used at Home none     DME Needed Upon Discharge  none     DCFS Notified     DCFS Notified Pt and baby's UDS + for THC     Do you have any problems affording any of your prescribed medications? No        Physical Activity    On average, how many days per week do you engage in moderate to strenuous exercise (like a brisk walk)? 1 day     On average, how many minutes do you engage in exercise at this level? 30 min        Financial Resource Strain    How hard is it for you to pay for the very basics like food, housing, medical care, and heating? Not hard at all        Housing Stability    In the last 12 months, was there a time when you were not able to pay the mortgage or rent on time? No     In the last 12 months, how many places have you lived? 1        Transportation Needs     In the past 12 months, has lack of transportation kept you from medical appointments or from getting medications? No     In the past 12 months, has lack of transportation kept you from meetings, work, or from getting things needed for daily living? No        Food Insecurity    Within the past 12 months, you worried that your food would run out before you got the money to buy more. Never true     Within the past 12 months, the food you bought just didn't last and you didn't have money to get more. Never true        Stress    Do you feel stress - tense, restless, nervous, or anxious, or unable to sleep at night because your mind is troubled all the time - these days? Not at all        Social Connections    In a typical week, how many times do you talk on the phone with family, friends, or neighbors? More than three times a week     How often do you get together with friends or relatives? More than three times a week     Do you belong to any clubs or organizations such as Yazidi groups, unions, fraternal or athletic groups, or school groups? No     How often do you attend meetings of the clubs or organizations you belong to? Never     Are you , , , , never , or living with a partner? Never         Alcohol Use    Q1: How often do you have a drink containing alcohol? Never     Q2: How many drinks containing alcohol do you have on a typical day when you are drinking? Patient does not drink     Q3: How often do you have six or more drinks on one occasion? Never                   Psychosocial (most recent)       OB Psychosocial Assessment - 11/04/22 1152          OB Psychosocial Assessment    Current or Previous  Service none     Anxieties, Fears or Concerns N/A     Major Change/Loss/Stressor/Fears denies     Feels Unsafe at Home or Work/School no     Feels Threatened by Someone no     Does Anyone Try to Keep You From Having Contact with Others or Doing Things Outside  Your Home? no     Physical Signs of Abuse Present no     Have You Felt Down, Depressed or Hopeless? no     Have You Felt Little Interest or Pleasure in Doing Things? no     Feels Like Hurting Self None     Feels Like Hurting Others no     Have You Ever Experienced a Traumatic Event? no     Have You Ever Witnessed a Violent Act? no     Have You Ever Experienced a Life Threatening Injury or Near Death Encounter? no     Current/Active Substance Abuse Yes     Substances THC     Current/Active Behavioral Health Issues No                        Healthcare Directives:   Advance Directive  (If Adv Dir status is received, view document under Adv Dir in header or Chart Review Media tab): Patient does not have Advance Directive, declines information.
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
## 2023-07-01 NOTE — ED PROVIDER NOTE - PHYSICAL EXAMINATION
General: AAOx3, NAD  HEENT: NCAT  Cardiac: Normal rate and rhythym, no murmurs, normal peripheral perfusion  Respiratory: Normal rate and effort. CTAB  GI: Soft, nondistended, nontender  Neuro: No focal deficits. TORRES equally x4, sensation to light touch intact throughout  MSK: FROMx4, no focal bony tenderness, no peripheral edema  Skin: No rash

## 2023-07-01 NOTE — ED PROVIDER NOTE - CLINICAL SUMMARY MEDICAL DECISION MAKING FREE TEXT BOX
Pt with hx etoh abuse and in otpt program, p/w AMS, denied etoh intake, serum level elevated on labs. Given IVF and observed overnight for SW/SBIRT eval in am. Signed out to oncoming provider
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
## 2023-12-15 ENCOUNTER — APPOINTMENT (OUTPATIENT)
Dept: NEUROLOGY | Facility: CLINIC | Age: 33
End: 2023-12-15

## 2023-12-23 ENCOUNTER — EMERGENCY (EMERGENCY)
Facility: HOSPITAL | Age: 33
LOS: 0 days | Discharge: HOME | End: 2023-12-23
Attending: EMERGENCY MEDICINE
Payer: MEDICAID

## 2023-12-23 VITALS
HEART RATE: 81 BPM | OXYGEN SATURATION: 100 % | DIASTOLIC BLOOD PRESSURE: 91 MMHG | TEMPERATURE: 98 F | SYSTOLIC BLOOD PRESSURE: 118 MMHG | RESPIRATION RATE: 16 BRPM

## 2023-12-23 VITALS — WEIGHT: 126.99 LBS

## 2023-12-23 DIAGNOSIS — R11.10 VOMITING, UNSPECIFIED: ICD-10-CM

## 2023-12-23 DIAGNOSIS — R33.9 RETENTION OF URINE, UNSPECIFIED: ICD-10-CM

## 2023-12-23 DIAGNOSIS — Z88.5 ALLERGY STATUS TO NARCOTIC AGENT: ICD-10-CM

## 2023-12-23 DIAGNOSIS — N12 TUBULO-INTERSTITIAL NEPHRITIS, NOT SPECIFIED AS ACUTE OR CHRONIC: ICD-10-CM

## 2023-12-23 DIAGNOSIS — R10.30 LOWER ABDOMINAL PAIN, UNSPECIFIED: ICD-10-CM

## 2023-12-23 DIAGNOSIS — Z88.8 ALLERGY STATUS TO OTHER DRUGS, MEDICAMENTS AND BIOLOGICAL SUBSTANCES: ICD-10-CM

## 2023-12-23 DIAGNOSIS — Z87.42 PERSONAL HISTORY OF OTHER DISEASES OF THE FEMALE GENITAL TRACT: ICD-10-CM

## 2023-12-23 DIAGNOSIS — Z88.1 ALLERGY STATUS TO OTHER ANTIBIOTIC AGENTS STATUS: ICD-10-CM

## 2023-12-23 DIAGNOSIS — Z87.19 PERSONAL HISTORY OF OTHER DISEASES OF THE DIGESTIVE SYSTEM: ICD-10-CM

## 2023-12-23 DIAGNOSIS — Z88.2 ALLERGY STATUS TO SULFONAMIDES: ICD-10-CM

## 2023-12-23 LAB
ALBUMIN SERPL ELPH-MCNC: 3.7 G/DL — SIGNIFICANT CHANGE UP (ref 3.3–5)
ALBUMIN SERPL ELPH-MCNC: 3.7 G/DL — SIGNIFICANT CHANGE UP (ref 3.3–5)
ALP SERPL-CCNC: 90 U/L — SIGNIFICANT CHANGE UP (ref 40–120)
ALP SERPL-CCNC: 90 U/L — SIGNIFICANT CHANGE UP (ref 40–120)
ALT FLD-CCNC: 18 U/L — SIGNIFICANT CHANGE UP (ref 12–78)
ALT FLD-CCNC: 18 U/L — SIGNIFICANT CHANGE UP (ref 12–78)
ANION GAP SERPL CALC-SCNC: 10 MMOL/L — SIGNIFICANT CHANGE UP (ref 5–17)
ANION GAP SERPL CALC-SCNC: 10 MMOL/L — SIGNIFICANT CHANGE UP (ref 5–17)
ANISOCYTOSIS BLD QL: SIGNIFICANT CHANGE UP
ANISOCYTOSIS BLD QL: SIGNIFICANT CHANGE UP
APPEARANCE UR: ABNORMAL
APPEARANCE UR: ABNORMAL
AST SERPL-CCNC: 22 U/L — SIGNIFICANT CHANGE UP (ref 15–37)
AST SERPL-CCNC: 22 U/L — SIGNIFICANT CHANGE UP (ref 15–37)
BACTERIA # UR AUTO: ABNORMAL /HPF
BACTERIA # UR AUTO: ABNORMAL /HPF
BASOPHILS # BLD AUTO: 0.04 K/UL — SIGNIFICANT CHANGE UP (ref 0–0.2)
BASOPHILS # BLD AUTO: 0.04 K/UL — SIGNIFICANT CHANGE UP (ref 0–0.2)
BASOPHILS NFR BLD AUTO: 0.4 % — SIGNIFICANT CHANGE UP (ref 0–2)
BASOPHILS NFR BLD AUTO: 0.4 % — SIGNIFICANT CHANGE UP (ref 0–2)
BILIRUB SERPL-MCNC: 1.2 MG/DL — SIGNIFICANT CHANGE UP (ref 0.2–1.2)
BILIRUB SERPL-MCNC: 1.2 MG/DL — SIGNIFICANT CHANGE UP (ref 0.2–1.2)
BILIRUB UR-MCNC: NEGATIVE — SIGNIFICANT CHANGE UP
BILIRUB UR-MCNC: NEGATIVE — SIGNIFICANT CHANGE UP
BUN SERPL-MCNC: 11 MG/DL — SIGNIFICANT CHANGE UP (ref 7–23)
BUN SERPL-MCNC: 11 MG/DL — SIGNIFICANT CHANGE UP (ref 7–23)
CALCIUM SERPL-MCNC: 9 MG/DL — SIGNIFICANT CHANGE UP (ref 8.5–10.1)
CALCIUM SERPL-MCNC: 9 MG/DL — SIGNIFICANT CHANGE UP (ref 8.5–10.1)
CAST: 14 /LPF — HIGH (ref 0–4)
CAST: 14 /LPF — HIGH (ref 0–4)
CHLORIDE SERPL-SCNC: 99 MMOL/L — SIGNIFICANT CHANGE UP (ref 96–108)
CHLORIDE SERPL-SCNC: 99 MMOL/L — SIGNIFICANT CHANGE UP (ref 96–108)
CO2 SERPL-SCNC: 28 MMOL/L — SIGNIFICANT CHANGE UP (ref 22–31)
CO2 SERPL-SCNC: 28 MMOL/L — SIGNIFICANT CHANGE UP (ref 22–31)
COLOR SPEC: SIGNIFICANT CHANGE UP
COLOR SPEC: SIGNIFICANT CHANGE UP
CREAT SERPL-MCNC: 1.05 MG/DL — SIGNIFICANT CHANGE UP (ref 0.5–1.3)
CREAT SERPL-MCNC: 1.05 MG/DL — SIGNIFICANT CHANGE UP (ref 0.5–1.3)
DIFF PNL FLD: ABNORMAL
DIFF PNL FLD: ABNORMAL
EGFR: 72 ML/MIN/1.73M2 — SIGNIFICANT CHANGE UP
EGFR: 72 ML/MIN/1.73M2 — SIGNIFICANT CHANGE UP
ELLIPTOCYTES BLD QL SMEAR: SLIGHT — SIGNIFICANT CHANGE UP
ELLIPTOCYTES BLD QL SMEAR: SLIGHT — SIGNIFICANT CHANGE UP
EOSINOPHIL # BLD AUTO: 0.03 K/UL — SIGNIFICANT CHANGE UP (ref 0–0.5)
EOSINOPHIL # BLD AUTO: 0.03 K/UL — SIGNIFICANT CHANGE UP (ref 0–0.5)
EOSINOPHIL NFR BLD AUTO: 0.3 % — SIGNIFICANT CHANGE UP (ref 0–6)
EOSINOPHIL NFR BLD AUTO: 0.3 % — SIGNIFICANT CHANGE UP (ref 0–6)
EPI CELLS # UR: PRESENT
EPI CELLS # UR: PRESENT
GLUCOSE SERPL-MCNC: 110 MG/DL — HIGH (ref 70–99)
GLUCOSE SERPL-MCNC: 110 MG/DL — HIGH (ref 70–99)
GLUCOSE UR QL: NEGATIVE MG/DL — SIGNIFICANT CHANGE UP
GLUCOSE UR QL: NEGATIVE MG/DL — SIGNIFICANT CHANGE UP
GRAN CASTS # UR COMP ASSIST: SIGNIFICANT CHANGE UP
GRAN CASTS # UR COMP ASSIST: SIGNIFICANT CHANGE UP
HCT VFR BLD CALC: 38.5 % — SIGNIFICANT CHANGE UP (ref 34.5–45)
HCT VFR BLD CALC: 38.5 % — SIGNIFICANT CHANGE UP (ref 34.5–45)
HGB BLD-MCNC: 13 G/DL — SIGNIFICANT CHANGE UP (ref 11.5–15.5)
HGB BLD-MCNC: 13 G/DL — SIGNIFICANT CHANGE UP (ref 11.5–15.5)
HYALINE CASTS # UR AUTO: PRESENT
HYALINE CASTS # UR AUTO: PRESENT
HYPOCHROMIA BLD QL: SLIGHT — SIGNIFICANT CHANGE UP
HYPOCHROMIA BLD QL: SLIGHT — SIGNIFICANT CHANGE UP
IMM GRANULOCYTES NFR BLD AUTO: 0.2 % — SIGNIFICANT CHANGE UP (ref 0–0.9)
IMM GRANULOCYTES NFR BLD AUTO: 0.2 % — SIGNIFICANT CHANGE UP (ref 0–0.9)
KETONES UR-MCNC: 15 MG/DL
KETONES UR-MCNC: 15 MG/DL
LACTATE SERPL-SCNC: 1.9 MMOL/L — SIGNIFICANT CHANGE UP (ref 0.7–2)
LACTATE SERPL-SCNC: 1.9 MMOL/L — SIGNIFICANT CHANGE UP (ref 0.7–2)
LEUKOCYTE ESTERASE UR-ACNC: ABNORMAL
LEUKOCYTE ESTERASE UR-ACNC: ABNORMAL
LYMPHOCYTES # BLD AUTO: 1.64 K/UL — SIGNIFICANT CHANGE UP (ref 1–3.3)
LYMPHOCYTES # BLD AUTO: 1.64 K/UL — SIGNIFICANT CHANGE UP (ref 1–3.3)
LYMPHOCYTES # BLD AUTO: 16.4 % — SIGNIFICANT CHANGE UP (ref 13–44)
LYMPHOCYTES # BLD AUTO: 16.4 % — SIGNIFICANT CHANGE UP (ref 13–44)
MANUAL SMEAR VERIFICATION: SIGNIFICANT CHANGE UP
MANUAL SMEAR VERIFICATION: SIGNIFICANT CHANGE UP
MCHC RBC-ENTMCNC: 28.7 PG — SIGNIFICANT CHANGE UP (ref 27–34)
MCHC RBC-ENTMCNC: 28.7 PG — SIGNIFICANT CHANGE UP (ref 27–34)
MCHC RBC-ENTMCNC: 33.8 GM/DL — SIGNIFICANT CHANGE UP (ref 32–36)
MCHC RBC-ENTMCNC: 33.8 GM/DL — SIGNIFICANT CHANGE UP (ref 32–36)
MCV RBC AUTO: 85 FL — SIGNIFICANT CHANGE UP (ref 80–100)
MCV RBC AUTO: 85 FL — SIGNIFICANT CHANGE UP (ref 80–100)
MONOCYTES # BLD AUTO: 0.54 K/UL — SIGNIFICANT CHANGE UP (ref 0–0.9)
MONOCYTES # BLD AUTO: 0.54 K/UL — SIGNIFICANT CHANGE UP (ref 0–0.9)
MONOCYTES NFR BLD AUTO: 5.4 % — SIGNIFICANT CHANGE UP (ref 2–14)
MONOCYTES NFR BLD AUTO: 5.4 % — SIGNIFICANT CHANGE UP (ref 2–14)
NEUTROPHILS # BLD AUTO: 7.76 K/UL — HIGH (ref 1.8–7.4)
NEUTROPHILS # BLD AUTO: 7.76 K/UL — HIGH (ref 1.8–7.4)
NEUTROPHILS NFR BLD AUTO: 77.3 % — HIGH (ref 43–77)
NEUTROPHILS NFR BLD AUTO: 77.3 % — HIGH (ref 43–77)
NITRITE UR-MCNC: NEGATIVE — SIGNIFICANT CHANGE UP
NITRITE UR-MCNC: NEGATIVE — SIGNIFICANT CHANGE UP
OVALOCYTES BLD QL SMEAR: SLIGHT — SIGNIFICANT CHANGE UP
OVALOCYTES BLD QL SMEAR: SLIGHT — SIGNIFICANT CHANGE UP
PH UR: 6 — SIGNIFICANT CHANGE UP (ref 5–8)
PH UR: 6 — SIGNIFICANT CHANGE UP (ref 5–8)
PLAT MORPH BLD: NORMAL — SIGNIFICANT CHANGE UP
PLAT MORPH BLD: NORMAL — SIGNIFICANT CHANGE UP
PLATELET # BLD AUTO: 298 K/UL — SIGNIFICANT CHANGE UP (ref 150–400)
PLATELET # BLD AUTO: 298 K/UL — SIGNIFICANT CHANGE UP (ref 150–400)
PLATELET COUNT - ESTIMATE: NORMAL — SIGNIFICANT CHANGE UP
PLATELET COUNT - ESTIMATE: NORMAL — SIGNIFICANT CHANGE UP
POIKILOCYTOSIS BLD QL AUTO: SLIGHT — SIGNIFICANT CHANGE UP
POIKILOCYTOSIS BLD QL AUTO: SLIGHT — SIGNIFICANT CHANGE UP
POTASSIUM SERPL-MCNC: 3.6 MMOL/L — SIGNIFICANT CHANGE UP (ref 3.5–5.3)
POTASSIUM SERPL-MCNC: 3.6 MMOL/L — SIGNIFICANT CHANGE UP (ref 3.5–5.3)
POTASSIUM SERPL-SCNC: 3.6 MMOL/L — SIGNIFICANT CHANGE UP (ref 3.5–5.3)
POTASSIUM SERPL-SCNC: 3.6 MMOL/L — SIGNIFICANT CHANGE UP (ref 3.5–5.3)
PROT SERPL-MCNC: 7.9 GM/DL — SIGNIFICANT CHANGE UP (ref 6–8.3)
PROT SERPL-MCNC: 7.9 GM/DL — SIGNIFICANT CHANGE UP (ref 6–8.3)
PROT UR-MCNC: >=1000 MG/DL
PROT UR-MCNC: >=1000 MG/DL
RBC # BLD: 4.53 M/UL — SIGNIFICANT CHANGE UP (ref 3.8–5.2)
RBC # BLD: 4.53 M/UL — SIGNIFICANT CHANGE UP (ref 3.8–5.2)
RBC # FLD: 20.4 % — HIGH (ref 10.3–14.5)
RBC # FLD: 20.4 % — HIGH (ref 10.3–14.5)
RBC BLD AUTO: ABNORMAL
RBC BLD AUTO: ABNORMAL
RBC CASTS # UR COMP ASSIST: 54 /HPF — HIGH (ref 0–4)
RBC CASTS # UR COMP ASSIST: 54 /HPF — HIGH (ref 0–4)
SODIUM SERPL-SCNC: 137 MMOL/L — SIGNIFICANT CHANGE UP (ref 135–145)
SODIUM SERPL-SCNC: 137 MMOL/L — SIGNIFICANT CHANGE UP (ref 135–145)
SP GR SPEC: 1.02 — SIGNIFICANT CHANGE UP (ref 1–1.03)
SP GR SPEC: 1.02 — SIGNIFICANT CHANGE UP (ref 1–1.03)
SQUAMOUS # UR AUTO: 1 /HPF — SIGNIFICANT CHANGE UP (ref 0–5)
SQUAMOUS # UR AUTO: 1 /HPF — SIGNIFICANT CHANGE UP (ref 0–5)
UROBILINOGEN FLD QL: 1 MG/DL — SIGNIFICANT CHANGE UP (ref 0.2–1)
UROBILINOGEN FLD QL: 1 MG/DL — SIGNIFICANT CHANGE UP (ref 0.2–1)
WBC # BLD: 10.03 K/UL — SIGNIFICANT CHANGE UP (ref 3.8–10.5)
WBC # BLD: 10.03 K/UL — SIGNIFICANT CHANGE UP (ref 3.8–10.5)
WBC # FLD AUTO: 10.03 K/UL — SIGNIFICANT CHANGE UP (ref 3.8–10.5)
WBC # FLD AUTO: 10.03 K/UL — SIGNIFICANT CHANGE UP (ref 3.8–10.5)
WBC UR QL: >998 /HPF — HIGH (ref 0–5)
WBC UR QL: >998 /HPF — HIGH (ref 0–5)

## 2023-12-23 PROCEDURE — 83605 ASSAY OF LACTIC ACID: CPT

## 2023-12-23 PROCEDURE — 87086 URINE CULTURE/COLONY COUNT: CPT

## 2023-12-23 PROCEDURE — 99285 EMERGENCY DEPT VISIT HI MDM: CPT

## 2023-12-23 PROCEDURE — 36415 COLL VENOUS BLD VENIPUNCTURE: CPT

## 2023-12-23 PROCEDURE — 81001 URINALYSIS AUTO W/SCOPE: CPT

## 2023-12-23 PROCEDURE — 74177 CT ABD & PELVIS W/CONTRAST: CPT | Mod: 26,MA

## 2023-12-23 PROCEDURE — 87040 BLOOD CULTURE FOR BACTERIA: CPT

## 2023-12-23 PROCEDURE — 80053 COMPREHEN METABOLIC PANEL: CPT

## 2023-12-23 PROCEDURE — 96375 TX/PRO/DX INJ NEW DRUG ADDON: CPT

## 2023-12-23 PROCEDURE — 96374 THER/PROPH/DIAG INJ IV PUSH: CPT | Mod: XU

## 2023-12-23 PROCEDURE — 87186 SC STD MICRODIL/AGAR DIL: CPT

## 2023-12-23 PROCEDURE — 74177 CT ABD & PELVIS W/CONTRAST: CPT | Mod: MA

## 2023-12-23 PROCEDURE — 99284 EMERGENCY DEPT VISIT MOD MDM: CPT | Mod: 25

## 2023-12-23 PROCEDURE — 85025 COMPLETE CBC W/AUTO DIFF WBC: CPT

## 2023-12-23 RX ORDER — PHENAZOPYRIDINE HCL 100 MG
200 TABLET ORAL ONCE
Refills: 0 | Status: COMPLETED | OUTPATIENT
Start: 2023-12-23 | End: 2023-12-23

## 2023-12-23 RX ORDER — SODIUM CHLORIDE 9 MG/ML
1000 INJECTION INTRAMUSCULAR; INTRAVENOUS; SUBCUTANEOUS ONCE
Refills: 0 | Status: COMPLETED | OUTPATIENT
Start: 2023-12-23 | End: 2023-12-23

## 2023-12-23 RX ORDER — ONDANSETRON 8 MG/1
1 TABLET, FILM COATED ORAL
Qty: 1 | Refills: 0
Start: 2023-12-23

## 2023-12-23 RX ORDER — ONDANSETRON 8 MG/1
4 TABLET, FILM COATED ORAL ONCE
Refills: 0 | Status: COMPLETED | OUTPATIENT
Start: 2023-12-23 | End: 2023-12-23

## 2023-12-23 RX ORDER — PHENAZOPYRIDINE HCL 100 MG
1 TABLET ORAL
Qty: 6 | Refills: 0
Start: 2023-12-23 | End: 2023-12-24

## 2023-12-23 RX ORDER — KETOROLAC TROMETHAMINE 30 MG/ML
15 SYRINGE (ML) INJECTION ONCE
Refills: 0 | Status: DISCONTINUED | OUTPATIENT
Start: 2023-12-23 | End: 2023-12-23

## 2023-12-23 RX ORDER — CEFTRIAXONE 500 MG/1
1000 INJECTION, POWDER, FOR SOLUTION INTRAMUSCULAR; INTRAVENOUS ONCE
Refills: 0 | Status: COMPLETED | OUTPATIENT
Start: 2023-12-23 | End: 2023-12-23

## 2023-12-23 RX ORDER — ACETAMINOPHEN 500 MG
1000 TABLET ORAL ONCE
Refills: 0 | Status: COMPLETED | OUTPATIENT
Start: 2023-12-23 | End: 2023-12-23

## 2023-12-23 RX ORDER — CEFDINIR 250 MG/5ML
1 POWDER, FOR SUSPENSION ORAL
Qty: 14 | Refills: 0
Start: 2023-12-23 | End: 2023-12-29

## 2023-12-23 RX ORDER — CEFTRIAXONE 500 MG/1
1000 INJECTION, POWDER, FOR SOLUTION INTRAMUSCULAR; INTRAVENOUS ONCE
Refills: 0 | Status: DISCONTINUED | OUTPATIENT
Start: 2023-12-23 | End: 2023-12-23

## 2023-12-23 RX ADMIN — SODIUM CHLORIDE 1000 MILLILITER(S): 9 INJECTION INTRAMUSCULAR; INTRAVENOUS; SUBCUTANEOUS at 18:12

## 2023-12-23 RX ADMIN — Medication 200 MILLIGRAM(S): at 19:57

## 2023-12-23 RX ADMIN — CEFTRIAXONE 1000 MILLIGRAM(S): 500 INJECTION, POWDER, FOR SOLUTION INTRAMUSCULAR; INTRAVENOUS at 19:42

## 2023-12-23 RX ADMIN — ONDANSETRON 4 MILLIGRAM(S): 8 TABLET, FILM COATED ORAL at 17:54

## 2023-12-23 RX ADMIN — SODIUM CHLORIDE 2000 MILLILITER(S): 9 INJECTION INTRAMUSCULAR; INTRAVENOUS; SUBCUTANEOUS at 17:54

## 2023-12-23 RX ADMIN — Medication 15 MILLIGRAM(S): at 19:55

## 2023-12-23 RX ADMIN — Medication 400 MILLIGRAM(S): at 17:57

## 2023-12-23 NOTE — ED STATDOCS - PHYSICAL EXAMINATION
Gen:  Well appearing in NAD  Head:  NC/AT  HEENT: pupils perrl,no pharyngeal erythema, uvula midline  Cardiac: S1S2, RRR  Abd: Soft, TTP suprapubic, RLQ TTP, right CVAT  Resp: No distress, CTA   musculoskeletal:: no deformities, no swelling, strength +5/+5  Skin: warm and dry as visualized, no rashes  Neuro: TORRES, aao x 4  Psych:alert, cooperative, appropriate mood and affect for situation

## 2023-12-23 NOTE — ED STATDOCS - PATIENT PORTAL LINK FT
You can access the FollowMyHealth Patient Portal offered by [facility] by registering at the following website: [email or web address]. By joining [employer]’s FollowMyHealth portal, you will also be able to view your health information using other applications (apps) compatible with our system. You can access the FollowMyHealth Patient Portal offered by [facility] by registering at the following website: [email or web address]. By joining [employer]’s FollowMyHealth portal, you will also be able to view your health information using other applications (apps) compatible with our system.

## 2023-12-23 NOTE — ED STATDOCS - OBJECTIVE STATEMENT
33 year old female with a PMHx of IBS, POTS, endometriosis, vertigo, anxiety; presents to the ED c/o urinary symptoms, lower abdominal pain radiating to the flank  Patient states x2 weeks ago she saw trace blood in her urine, but thought it was related to menstrual spotting due to being on birth control. 33 year old female with a PMHx of IBS, POTS, endometriosis, vertigo, anxiety, UTI; presents to the ED c/o urinary symptoms including urinary retention and difficulty voiding, lower abdominal pain radiating to the flank described as cramping pain onset last night. Patient also notes x2 weeks ago she saw trace blood in her urine, but thought it was related to menstrual spotting due to being on birth control. Also endorses vomiting.

## 2023-12-23 NOTE — ED STATDOCS - CLINICAL SUMMARY MEDICAL DECISION MAKING FREE TEXT BOX
Labs, pregnancy test if negative will CT to rule out pyelonephritis but if positive will do ultrasound.

## 2023-12-23 NOTE — ED ADULT NURSE NOTE - OBJECTIVE STATEMENT
c/o urinary frequency, dysuria, spotting, for two weeks. now pain is in right flank. also c/o nausea

## 2023-12-23 NOTE — ED ADULT TRIAGE NOTE - STATUS:
FA complete: [employer]    Copay card just needed to be updated with new PCN and group. Updated in [facility], test claim pays $0 copay.     BIN: [id number]  PCN: [employer]  ID: [id number]  GRP: [id number]   Applied

## 2023-12-23 NOTE — ED ADULT NURSE NOTE - NSFALLUNIVINTERV_ED_ALL_ED
Bed/Stretcher in lowest position, wheels locked, appropriate side rails in place/Call bell, personal items and telephone in reach/Instruct patient to call for assistance before getting out of bed/chair/stretcher/Non-slip footwear applied when patient is off stretcher/[place] to call system/Physically safe environment - no spills, clutter or unnecessary equipment/Purposeful proactive rounding/Room/bathroom lighting operational, light cord in reach Bed/Stretcher in lowest position, wheels locked, appropriate side rails in place/Call bell, personal items and telephone in reach/Instruct patient to call for assistance before getting out of bed/chair/stretcher/Non-slip footwear applied when patient is off stretcher/[place] to call system/Physically safe environment - no spills, clutter or unnecessary equipment/Purposeful proactive rounding/Room/bathroom lighting operational, light cord in reach

## 2023-12-23 NOTE — ED STATDOCS - PROGRESS NOTE DETAILS
Patient seen and evaluated s/p workup.  SHe is feeling much better but given b/l kidney infection advised admission for further evaluation and management, but patient would prefer to go home on oral medications and understands she will need to return if she gets worse or cannot tolerate PO.  -Qing Soler PA-C

## 2023-12-27 LAB
-  AMOXICILLIN/CLAVULANIC ACID: SIGNIFICANT CHANGE UP
-  AMOXICILLIN/CLAVULANIC ACID: SIGNIFICANT CHANGE UP
-  AMPICILLIN/SULBACTAM: SIGNIFICANT CHANGE UP
-  AMPICILLIN/SULBACTAM: SIGNIFICANT CHANGE UP
-  AMPICILLIN: SIGNIFICANT CHANGE UP
-  AMPICILLIN: SIGNIFICANT CHANGE UP
-  AZTREONAM: SIGNIFICANT CHANGE UP
-  AZTREONAM: SIGNIFICANT CHANGE UP
-  CEFAZOLIN: SIGNIFICANT CHANGE UP
-  CEFAZOLIN: SIGNIFICANT CHANGE UP
-  CEFEPIME: SIGNIFICANT CHANGE UP
-  CEFEPIME: SIGNIFICANT CHANGE UP
-  CEFOXITIN: SIGNIFICANT CHANGE UP
-  CEFOXITIN: SIGNIFICANT CHANGE UP
-  CEFTRIAXONE: SIGNIFICANT CHANGE UP
-  CEFTRIAXONE: SIGNIFICANT CHANGE UP
-  CEFUROXIME: SIGNIFICANT CHANGE UP
-  CEFUROXIME: SIGNIFICANT CHANGE UP
-  CIPROFLOXACIN: SIGNIFICANT CHANGE UP
-  CIPROFLOXACIN: SIGNIFICANT CHANGE UP
-  ERTAPENEM: SIGNIFICANT CHANGE UP
-  ERTAPENEM: SIGNIFICANT CHANGE UP
-  GENTAMICIN: SIGNIFICANT CHANGE UP
-  GENTAMICIN: SIGNIFICANT CHANGE UP
-  IMIPENEM: SIGNIFICANT CHANGE UP
-  IMIPENEM: SIGNIFICANT CHANGE UP
-  LEVOFLOXACIN: SIGNIFICANT CHANGE UP
-  LEVOFLOXACIN: SIGNIFICANT CHANGE UP
-  MEROPENEM: SIGNIFICANT CHANGE UP
-  MEROPENEM: SIGNIFICANT CHANGE UP
-  NITROFURANTOIN: SIGNIFICANT CHANGE UP
-  NITROFURANTOIN: SIGNIFICANT CHANGE UP
-  PIPERACILLIN/TAZOBACTAM: SIGNIFICANT CHANGE UP
-  PIPERACILLIN/TAZOBACTAM: SIGNIFICANT CHANGE UP
-  TOBRAMYCIN: SIGNIFICANT CHANGE UP
-  TOBRAMYCIN: SIGNIFICANT CHANGE UP
-  TRIMETHOPRIM/SULFAMETHOXAZOLE: SIGNIFICANT CHANGE UP
-  TRIMETHOPRIM/SULFAMETHOXAZOLE: SIGNIFICANT CHANGE UP
CULTURE RESULTS: ABNORMAL
CULTURE RESULTS: ABNORMAL
METHOD TYPE: SIGNIFICANT CHANGE UP
METHOD TYPE: SIGNIFICANT CHANGE UP
ORGANISM # SPEC MICROSCOPIC CNT: ABNORMAL
ORGANISM # SPEC MICROSCOPIC CNT: ABNORMAL
ORGANISM # SPEC MICROSCOPIC CNT: SIGNIFICANT CHANGE UP
ORGANISM # SPEC MICROSCOPIC CNT: SIGNIFICANT CHANGE UP
SPECIMEN SOURCE: SIGNIFICANT CHANGE UP
SPECIMEN SOURCE: SIGNIFICANT CHANGE UP

## 2023-12-29 LAB
CULTURE RESULTS: SIGNIFICANT CHANGE UP
SPECIMEN SOURCE: SIGNIFICANT CHANGE UP

## 2024-01-19 ENCOUNTER — APPOINTMENT (OUTPATIENT)
Dept: DERMATOLOGY | Facility: CLINIC | Age: 34
End: 2024-01-19

## 2024-03-07 NOTE — ED ADULT NURSE NOTE - JUGULAR VENOUS DISTENTION
Reason for call: sinus infection, yellow and brown discharge and sinus pressure wanting an antibiotic     Onset: 3/4/24    Location / description / Associated Symptoms:   Sinus infection  Thick mucus  L cheek, eye, pain  Denies fever      Precipitating Factors: sinus infection     Pain Scale (1-10), 10 highest: 7/10    What improves/worsens symptoms:   Increased vitamin intake  Otc medication     Symptom specific medications:   Zyrtec D last dose 3/6/24    LMP : No LMP recorded. Patient is postmenopausal.    Are you pregnant or breast feeding:   NA    Recent Care:   Denies     Did the patient have a positive coronavirus screening?: No    PLAN:  Provider's office  See in provider's office within 4 hours - if no appointment, go to Urgent Care    Patient/Caller  declines  to follow recommendations. Pt stated 'I work full time and not able to be off today to go to the doctor today.'     ** Requesting antibiotic **     Pt verbalized understanding of care advice provided. Message to clinic.     Reason for Disposition   SEVERE sinus pain    Protocols used: Sinus Pain or Congestion-A-OH     absent

## 2024-04-13 ENCOUNTER — EMERGENCY (EMERGENCY)
Facility: HOSPITAL | Age: 34
LOS: 0 days | Discharge: HOME | End: 2024-04-13
Attending: STUDENT IN AN ORGANIZED HEALTH CARE EDUCATION/TRAINING PROGRAM
Payer: MEDICAID

## 2024-04-13 VITALS
HEART RATE: 89 BPM | DIASTOLIC BLOOD PRESSURE: 78 MMHG | SYSTOLIC BLOOD PRESSURE: 107 MMHG | RESPIRATION RATE: 18 BRPM | OXYGEN SATURATION: 100 % | TEMPERATURE: 99 F

## 2024-04-13 VITALS — WEIGHT: 130.07 LBS | HEIGHT: 60 IN

## 2024-04-13 DIAGNOSIS — Z88.2 ALLERGY STATUS TO SULFONAMIDES: ICD-10-CM

## 2024-04-13 DIAGNOSIS — R10.9 UNSPECIFIED ABDOMINAL PAIN: ICD-10-CM

## 2024-04-13 DIAGNOSIS — Z88.1 ALLERGY STATUS TO OTHER ANTIBIOTIC AGENTS: ICD-10-CM

## 2024-04-13 DIAGNOSIS — R74.01 ELEVATION OF LEVELS OF LIVER TRANSAMINASE LEVELS: ICD-10-CM

## 2024-04-13 DIAGNOSIS — Z88.5 ALLERGY STATUS TO NARCOTIC AGENT: ICD-10-CM

## 2024-04-13 DIAGNOSIS — K76.0 FATTY (CHANGE OF) LIVER, NOT ELSEWHERE CLASSIFIED: ICD-10-CM

## 2024-04-13 DIAGNOSIS — E87.20 ACIDOSIS, UNSPECIFIED: ICD-10-CM

## 2024-04-13 LAB
ALBUMIN SERPL ELPH-MCNC: 4.2 G/DL — SIGNIFICANT CHANGE UP (ref 3.3–5)
ALP SERPL-CCNC: 176 U/L — HIGH (ref 40–120)
ALT FLD-CCNC: 186 U/L — HIGH (ref 12–78)
ANION GAP SERPL CALC-SCNC: 11 MMOL/L — SIGNIFICANT CHANGE UP (ref 5–17)
APPEARANCE UR: CLEAR — SIGNIFICANT CHANGE UP
APTT BLD: 33.9 SEC — SIGNIFICANT CHANGE UP (ref 24.5–35.6)
AST SERPL-CCNC: 617 U/L — HIGH (ref 15–37)
BASOPHILS # BLD AUTO: 0.07 K/UL — SIGNIFICANT CHANGE UP (ref 0–0.2)
BASOPHILS NFR BLD AUTO: 1.5 % — SIGNIFICANT CHANGE UP (ref 0–2)
BILIRUB SERPL-MCNC: 0.7 MG/DL — SIGNIFICANT CHANGE UP (ref 0.2–1.2)
BILIRUB UR-MCNC: NEGATIVE — SIGNIFICANT CHANGE UP
BUN SERPL-MCNC: 7 MG/DL — SIGNIFICANT CHANGE UP (ref 7–23)
CALCIUM SERPL-MCNC: 9.6 MG/DL — SIGNIFICANT CHANGE UP (ref 8.5–10.1)
CHLORIDE SERPL-SCNC: 104 MMOL/L — SIGNIFICANT CHANGE UP (ref 96–108)
CO2 SERPL-SCNC: 26 MMOL/L — SIGNIFICANT CHANGE UP (ref 22–31)
COLOR SPEC: YELLOW — SIGNIFICANT CHANGE UP
CREAT SERPL-MCNC: 0.76 MG/DL — SIGNIFICANT CHANGE UP (ref 0.5–1.3)
DIFF PNL FLD: NEGATIVE — SIGNIFICANT CHANGE UP
EGFR: 105 ML/MIN/1.73M2 — SIGNIFICANT CHANGE UP
EOSINOPHIL # BLD AUTO: 0.09 K/UL — SIGNIFICANT CHANGE UP (ref 0–0.5)
EOSINOPHIL NFR BLD AUTO: 1.9 % — SIGNIFICANT CHANGE UP (ref 0–6)
GLUCOSE SERPL-MCNC: 88 MG/DL — SIGNIFICANT CHANGE UP (ref 70–99)
GLUCOSE UR QL: NEGATIVE MG/DL — SIGNIFICANT CHANGE UP
HCT VFR BLD CALC: 45.3 % — HIGH (ref 34.5–45)
HGB BLD-MCNC: 15.1 G/DL — SIGNIFICANT CHANGE UP (ref 11.5–15.5)
IMM GRANULOCYTES NFR BLD AUTO: 0.2 % — SIGNIFICANT CHANGE UP (ref 0–0.9)
INR BLD: 0.92 RATIO — SIGNIFICANT CHANGE UP (ref 0.85–1.18)
KETONES UR-MCNC: ABNORMAL MG/DL
LACTATE SERPL-SCNC: 3.7 MMOL/L — HIGH (ref 0.7–2)
LACTATE SERPL-SCNC: 3.7 MMOL/L — HIGH (ref 0.7–2)
LEUKOCYTE ESTERASE UR-ACNC: NEGATIVE — SIGNIFICANT CHANGE UP
LYMPHOCYTES # BLD AUTO: 2.13 K/UL — SIGNIFICANT CHANGE UP (ref 1–3.3)
LYMPHOCYTES # BLD AUTO: 44.6 % — HIGH (ref 13–44)
MCHC RBC-ENTMCNC: 33.3 GM/DL — SIGNIFICANT CHANGE UP (ref 32–36)
MCHC RBC-ENTMCNC: 34.1 PG — HIGH (ref 27–34)
MCV RBC AUTO: 102.3 FL — HIGH (ref 80–100)
MONOCYTES # BLD AUTO: 0.26 K/UL — SIGNIFICANT CHANGE UP (ref 0–0.9)
MONOCYTES NFR BLD AUTO: 5.4 % — SIGNIFICANT CHANGE UP (ref 2–14)
NEUTROPHILS # BLD AUTO: 2.22 K/UL — SIGNIFICANT CHANGE UP (ref 1.8–7.4)
NEUTROPHILS NFR BLD AUTO: 46.4 % — SIGNIFICANT CHANGE UP (ref 43–77)
NITRITE UR-MCNC: NEGATIVE — SIGNIFICANT CHANGE UP
PH UR: 6 — SIGNIFICANT CHANGE UP (ref 5–8)
PLATELET # BLD AUTO: 302 K/UL — SIGNIFICANT CHANGE UP (ref 150–400)
POTASSIUM SERPL-MCNC: 4.2 MMOL/L — SIGNIFICANT CHANGE UP (ref 3.5–5.3)
POTASSIUM SERPL-SCNC: 4.2 MMOL/L — SIGNIFICANT CHANGE UP (ref 3.5–5.3)
PROT SERPL-MCNC: 7.9 GM/DL — SIGNIFICANT CHANGE UP (ref 6–8.3)
PROT UR-MCNC: NEGATIVE MG/DL — SIGNIFICANT CHANGE UP
PROTHROM AB SERPL-ACNC: 10.4 SEC — SIGNIFICANT CHANGE UP (ref 9.5–13)
RBC # BLD: 4.43 M/UL — SIGNIFICANT CHANGE UP (ref 3.8–5.2)
RBC # FLD: 14.8 % — HIGH (ref 10.3–14.5)
SODIUM SERPL-SCNC: 141 MMOL/L — SIGNIFICANT CHANGE UP (ref 135–145)
SP GR SPEC: 1.01 — SIGNIFICANT CHANGE UP (ref 1–1.03)
UROBILINOGEN FLD QL: 1 MG/DL — SIGNIFICANT CHANGE UP (ref 0.2–1)
WBC # BLD: 4.78 K/UL — SIGNIFICANT CHANGE UP (ref 3.8–10.5)
WBC # FLD AUTO: 4.78 K/UL — SIGNIFICANT CHANGE UP (ref 3.8–10.5)

## 2024-04-13 PROCEDURE — 71046 X-RAY EXAM CHEST 2 VIEWS: CPT | Mod: 26

## 2024-04-13 PROCEDURE — 36415 COLL VENOUS BLD VENIPUNCTURE: CPT

## 2024-04-13 PROCEDURE — 81003 URINALYSIS AUTO W/O SCOPE: CPT

## 2024-04-13 PROCEDURE — 74177 CT ABD & PELVIS W/CONTRAST: CPT | Mod: 26,MC

## 2024-04-13 PROCEDURE — 99285 EMERGENCY DEPT VISIT HI MDM: CPT | Mod: 25

## 2024-04-13 PROCEDURE — 74177 CT ABD & PELVIS W/CONTRAST: CPT | Mod: MC

## 2024-04-13 PROCEDURE — 80053 COMPREHEN METABOLIC PANEL: CPT

## 2024-04-13 PROCEDURE — 85025 COMPLETE CBC W/AUTO DIFF WBC: CPT

## 2024-04-13 PROCEDURE — 93010 ELECTROCARDIOGRAM REPORT: CPT

## 2024-04-13 PROCEDURE — 87086 URINE CULTURE/COLONY COUNT: CPT

## 2024-04-13 PROCEDURE — 93005 ELECTROCARDIOGRAM TRACING: CPT

## 2024-04-13 PROCEDURE — 85730 THROMBOPLASTIN TIME PARTIAL: CPT

## 2024-04-13 PROCEDURE — 85610 PROTHROMBIN TIME: CPT

## 2024-04-13 PROCEDURE — 71046 X-RAY EXAM CHEST 2 VIEWS: CPT

## 2024-04-13 PROCEDURE — 96360 HYDRATION IV INFUSION INIT: CPT | Mod: XU

## 2024-04-13 PROCEDURE — 87040 BLOOD CULTURE FOR BACTERIA: CPT

## 2024-04-13 PROCEDURE — 99285 EMERGENCY DEPT VISIT HI MDM: CPT

## 2024-04-13 PROCEDURE — 83605 ASSAY OF LACTIC ACID: CPT

## 2024-04-13 RX ORDER — SODIUM CHLORIDE 9 MG/ML
2000 INJECTION INTRAMUSCULAR; INTRAVENOUS; SUBCUTANEOUS ONCE
Refills: 0 | Status: COMPLETED | OUTPATIENT
Start: 2024-04-13 | End: 2024-04-13

## 2024-04-13 RX ADMIN — SODIUM CHLORIDE 2000 MILLILITER(S): 9 INJECTION INTRAMUSCULAR; INTRAVENOUS; SUBCUTANEOUS at 13:20

## 2024-04-13 RX ADMIN — SODIUM CHLORIDE 2000 MILLILITER(S): 9 INJECTION INTRAMUSCULAR; INTRAVENOUS; SUBCUTANEOUS at 14:30

## 2024-04-13 NOTE — ED STATDOCS - NSFOLLOWUPINSTRUCTIONS_ED_ALL_ED_FT
Fatty Liver Disease    The liver converts food into energy, removes toxic material from the blood, makes important proteins, and absorbs necessary vitamins from food. Fatty liver disease occurs when too much fat has built up in your liver cells. Fatty liver disease is also called hepatic steatosis.    In many cases, fatty liver disease does not cause symptoms or problems. It is often diagnosed when tests are being done for other reasons. However, over time, fatty liver can cause inflammation that may lead to more serious liver problems, such as scarring of the liver (cirrhosis) and liver failure.    Fatty liver is associated with insulin resistance, increased body fat, high blood pressure (hypertension), and high cholesterol. These are features of metabolic syndrome and increase your risk for stroke, diabetes, and heart disease.    What are the causes?  This condition may be caused by components of metabolic syndrome:  Obesity.  Insulin resistance.  High cholesterol.  Other causes:  Alcohol abuse.  Poor nutrition.  Cushing syndrome.  Pregnancy.  Certain drugs.  Poisons.  Some viral infections.  What increases the risk?  You are more likely to develop this condition if you:  Abuse alcohol.  Are overweight.  Have diabetes.  Have hepatitis.  Have a high triglyceride level.  Are pregnant.  What are the signs or symptoms?  Fatty liver disease often does not cause symptoms. If symptoms do develop, they can include:  Fatigue and weakness.  Weight loss.  Confusion.  Nausea, vomiting, or abdominal pain.  Yellowing of your skin and the white parts of your eyes (jaundice).  Itchy skin.  How is this diagnosed?  This condition may be diagnosed by:  A physical exam and your medical history.  Blood tests.  Imaging tests, such as an ultrasound, CT scan, or MRI.  A liver biopsy. A small sample of liver tissue is removed using a needle. The sample is then looked at under a microscope.  How is this treated?  Fatty liver disease is often caused by other health conditions. Treatment for fatty liver may involve medicines and lifestyle changes to manage conditions such as:  Alcoholism.  High cholesterol.  Diabetes.  Being overweight or obese.  Follow these instructions at home:    Do not drink alcohol. If you have trouble quitting, ask your health care provider how to safely quit with the help of medicine or a supervised program. This is important to keep your condition from getting worse.  Eat a healthy diet as told by your health care provider. Ask your health care provider about working with a dietitian to develop an eating plan.  Exercise regularly. This can help you lose weight and control your cholesterol and diabetes. Talk to your health care provider about an exercise plan and which activities are best for you.  Take over-the-counter and prescription medicines only as told by your health care provider.  Keep all follow-up visits. This is important.  Contact a health care provider if:  You have trouble controlling your:  Blood sugar. This is especially important if you have diabetes.  Cholesterol.  Drinking of alcohol.  Get help right away if:  You have abdominal pain.  You have jaundice.  You have nausea and are vomiting.  You vomit blood or material that looks like coffee grounds.  You have stools that are black, tar-like, or bloody.  Summary  Fatty liver disease develops when too much fat builds up in the cells of your liver.  Fatty liver disease often causes no symptoms or problems. However, over time, fatty liver can cause inflammation that may lead to more serious liver problems, such as scarring of the liver (cirrhosis).  You are more likely to develop this condition if you abuse alcohol, are pregnant, are overweight, have diabetes, have hepatitis, or have high triglyceride or cholesterol levels.  Contact your health care provider if you have trouble controlling your blood sugar, cholesterol, or drinking of alcohol.  This information is not intended to replace advice given to you by your health care provider. Make sure you discuss any questions you have with your health care provider.

## 2024-04-13 NOTE — ED ADULT NURSE NOTE - OBJECTIVE STATEMENT
Pt presents to ED c/o bilateral flank pain and worsening urinary symptoms. pt reports hx of bilateral kidney infection who declined admission to hospital and was sent home on PO antibiotics. pt reports she never felt better after being discharged home and "kept putting it off", until she spoke to a family member who advised her to be seen in ED.

## 2024-04-13 NOTE — ED ADULT TRIAGE NOTE - CHIEF COMPLAINT QUOTE
Pt presented to the ER with urinary symptoms. Pt has been unable to urinate since yesterday afternoon. Pt also c/o back pain. Pt reported that she was here in Dec and was dx with a kidney infection, pt was recommended to stay but pt did not. Pt denies any fevers at this time.

## 2024-04-13 NOTE — ED STATDOCS - OBJECTIVE STATEMENT
33 y/o female w/ a PMHx of pyonephritis presents to the ED c/o b/l flank pain and worsening urinary Sx. Pt notes she had a upper and lower kidney infection a few months ago, improved after course of abx, was told to say however left AMA. Pt now states she is only urinated 1-2x a day. Pt also endorsing worsening n/v x 2-3, also notes the flank pain has been shooting upwards. Denies hematuria. No other complaints at this time. Pt notes multiple negative preg tests at home. Pt also recently stopped OCP, has been having irregular periods since. 33 y/o female w/ a PMHx of pyelonephritis presents to the ED c/o b/l flank pain and worsening urinary Sx. Pt notes she had a upper and lower kidney infection a few months ago, improved after course of abx, was told to say however left AMA. Pt now states she is only urinated 1-2x a day. Pt also endorsing worsening n/v x 2-3, also notes the flank pain has been shooting upwards. Denies hematuria. No other complaints at this time. Pt notes multiple negative preg tests at home. Pt also recently stopped OCP, has been having irregular periods since.

## 2024-04-13 NOTE — ED STATDOCS - ATTENDING APP SHARED VISIT CONTRIBUTION OF CARE
I, Berenice Toth DO,  performed the initial face to face bedside interview with this patient regarding history of present illness, review of symptoms and relevant past medical, social and family history.  I completed an independent physical examination.  I was the initial provider who evaluated this patient.   I personally saw the patient and performed a substantive portion of the visit including all aspects of the medical decision making.  I have signed out the follow up of any pending tests (i.e. labs, radiological studies) to the ACP.  I have communicated the patient’s plan of care and disposition with the ACP.  The history, relevant review of systems, past medical and surgical history, medical decision making, and physical examination was documented by the scribe in my presence and I attest to the accuracy of the documentation.

## 2024-04-13 NOTE — ED ADULT NURSE NOTE - NSFALLUNIVINTERV_ED_ALL_ED
Bed/Stretcher in lowest position, wheels locked, appropriate side rails in place/Call bell, personal items and telephone in reach/Instruct patient to call for assistance before getting out of bed/chair/stretcher/Non-slip footwear applied when patient is off stretcher/[place] to call system/Physically safe environment - no spills, clutter or unnecessary equipment/Purposeful proactive rounding/Room/bathroom lighting operational, light cord in reach

## 2024-04-13 NOTE — ED STATDOCS - PATIENT PORTAL LINK FT
You can access the FollowMyHealth Patient Portal offered by [facility] by registering at the following website: [email or web address]. By joining [employer]’s FollowMyHealth portal, you will also be able to view your health information using other applications (apps) compatible with our system.

## 2024-04-13 NOTE — ED STATDOCS - CARE PLAN
1 Principal Discharge DX:	Transaminitis  Secondary Diagnosis:	Lactic acidosis  Secondary Diagnosis:	Hepatic steatosis

## 2024-04-13 NOTE — ED STATDOCS - PROGRESS NOTE DETAILS
33 y/o F with PMH of pyelonephritis presents with bilateral flank pain, urinary urgency. States she had same symptoms 12/23, signed out AMA after being recommended for admission. States symptoms improved, but never went away completely. Was prompted to return to ED today due to nausea/vomiting over the past 2 days. PE: Well appearing. Pt arrived to ED with large, heavy purse. Cardiac: s1s2, RRR. Lungs; CTAB. Abdomen: NBS x4 soft, nontender. Bilateral CVAT R>L. A/P; r/o UTI/pyelonephritis, plan for labs, CT, reassess. - Renny Rubalcava PA-C labs and CT reviewed with Dr. Ta. Symptoms likely related to alcoholic hepatitis. Pt previously documented to have history of ETOH abuse, previously gone to AA. Advised lactate may not clear. OK for dc with outpatient follow up. - Renny Rubalcava PA-C Navneet DO:   Patient with no history of liver disease.  Likely secondary to history of alcohol abuse.  Lactic acidosis related to liver disease.  No obvious source of infectious etiology.  Patient instructed to follow-up with gastroenterology as outpatient.  Strict return precautions given.

## 2024-04-13 NOTE — ED STATDOCS - CARE PROVIDER_API CALL
Izaiah Ta  Gastroenterology  [address] [place] [id number]  Phone: [phone]  Follow Up Time:

## 2024-04-13 NOTE — ED STATDOCS - CLINICAL SUMMARY MEDICAL DECISION MAKING FREE TEXT BOX
33 y/o w/ hx of pylo, now w/ worsening pain, n/v. Plan for screening EKG, labs, and urine 33 y/o w/ hx of pyelo, now w/ worsening pain, n/v. Plan for screening EKG, labs, and urine

## 2024-04-15 LAB
CULTURE RESULTS: SIGNIFICANT CHANGE UP
SPECIMEN SOURCE: SIGNIFICANT CHANGE UP

## 2024-12-16 NOTE — ED STATDOCS - CPE ED SKIN NORM
Patient with swelling of lips and sensation of her throat tightening.  No obvious findings on exam.  Patient has had this recurrently without known etiology raising possibility of idiopathic angioedema.  Will treat as allergic reaction with antihistamines and steroids.  Will observe patient in the ER to ensure stability. normal...

## 2024-12-26 NOTE — ED ADULT NURSE NOTE - BREATH SOUNDS, MLM
This is important to consider and that she is on multi medication therapy.  Currently she is on methotrexate which can affect wound healing.        Clear
